# Patient Record
Sex: MALE | Race: WHITE | NOT HISPANIC OR LATINO | URBAN - METROPOLITAN AREA
[De-identification: names, ages, dates, MRNs, and addresses within clinical notes are randomized per-mention and may not be internally consistent; named-entity substitution may affect disease eponyms.]

---

## 2022-10-20 ENCOUNTER — APPOINTMENT (OUTPATIENT)
Dept: URBAN - METROPOLITAN AREA CLINIC 204 | Age: 62
Setting detail: DERMATOLOGY
End: 2022-10-20

## 2022-10-20 DIAGNOSIS — D485 NEOPLASM OF UNCERTAIN BEHAVIOR OF SKIN: ICD-10-CM

## 2022-10-20 PROBLEM — D48.5 NEOPLASM OF UNCERTAIN BEHAVIOR OF SKIN: Status: ACTIVE | Noted: 2022-10-20

## 2022-10-20 PROCEDURE — 99204 OFFICE O/P NEW MOD 45 MIN: CPT

## 2022-10-20 PROCEDURE — OTHER COUNSELING: OTHER

## 2022-10-20 PROCEDURE — OTHER SURGICAL DECISION MAKING: OTHER

## 2022-10-20 PROCEDURE — OTHER DEFER: OTHER

## 2022-10-20 PROCEDURE — OTHER MIPS QUALITY: OTHER

## 2022-10-20 NOTE — PROCEDURE: MIPS QUALITY
Quality 130: Documentation Of Current Medications In The Medical Record: Current Medications Documented
Detail Level: Detailed
Quality 110: Preventive Care And Screening: Influenza Immunization: Influenza Immunization Administered during Influenza season
Quality 226: Preventive Care And Screening: Tobacco Use: Screening And Cessation Intervention: Patient screened for tobacco use, is a smoker AND did not received Cessation Counseling for Unknown Reasons within the Previous 12 Months
Quality 111:Pneumonia Vaccination Status For Older Adults: Pneumococcal vaccine (PPSV23) was not administered on or after patient’s 60th birthday and before the end of the measurement period, reason not otherwise specified

## 2023-01-01 ENCOUNTER — HOSPITAL ENCOUNTER (INPATIENT)
Facility: HOSPITAL | Age: 63
LOS: 3 days | Discharge: HOME | DRG: 699 | End: 2023-12-01
Attending: EMERGENCY MEDICINE | Admitting: HOSPITALIST
Payer: MEDICARE

## 2023-01-01 ENCOUNTER — LAB (OUTPATIENT)
Dept: LAB | Facility: LAB | Age: 63
End: 2023-01-01
Payer: MEDICARE

## 2023-01-01 ENCOUNTER — EVALUATION (OUTPATIENT)
Dept: TRANSPLANT | Facility: HOSPITAL | Age: 63
End: 2023-01-01

## 2023-01-01 ENCOUNTER — TELEPHONE (OUTPATIENT)
Dept: PRIMARY CARE | Facility: CLINIC | Age: 63
End: 2023-01-01
Payer: COMMERCIAL

## 2023-01-01 ENCOUNTER — HOSPITAL ENCOUNTER (OUTPATIENT)
Dept: VASCULAR MEDICINE | Facility: CLINIC | Age: 63
Discharge: HOME | End: 2023-11-06
Payer: MEDICARE

## 2023-01-01 ENCOUNTER — OFFICE VISIT (OUTPATIENT)
Dept: NEPHROLOGY | Facility: CLINIC | Age: 63
End: 2023-01-01
Payer: COMMERCIAL

## 2023-01-01 ENCOUNTER — PATIENT OUTREACH (OUTPATIENT)
Dept: CARE COORDINATION | Facility: CLINIC | Age: 63
End: 2023-01-01
Payer: COMMERCIAL

## 2023-01-01 ENCOUNTER — OTHER (OUTPATIENT)
Dept: TRANSPLANT | Facility: HOSPITAL | Age: 63
End: 2023-01-01
Payer: MEDICARE

## 2023-01-01 ENCOUNTER — TELEPHONE (OUTPATIENT)
Dept: PRIMARY CARE | Facility: CLINIC | Age: 63
End: 2023-01-01
Payer: MEDICARE

## 2023-01-01 ENCOUNTER — APPOINTMENT (OUTPATIENT)
Dept: TRANSPLANT | Facility: HOSPITAL | Age: 63
End: 2023-01-01
Payer: COMMERCIAL

## 2023-01-01 ENCOUNTER — DOCUMENTATION (OUTPATIENT)
Dept: TRANSPLANT | Facility: HOSPITAL | Age: 63
End: 2023-01-01
Payer: COMMERCIAL

## 2023-01-01 ENCOUNTER — INFUSION (OUTPATIENT)
Dept: HEMATOLOGY/ONCOLOGY | Facility: CLINIC | Age: 63
End: 2023-01-01
Payer: MEDICARE

## 2023-01-01 ENCOUNTER — TELEPHONE (OUTPATIENT)
Dept: TRANSPLANT | Facility: HOSPITAL | Age: 63
End: 2023-01-01
Payer: COMMERCIAL

## 2023-01-01 ENCOUNTER — OFFICE VISIT (OUTPATIENT)
Dept: GASTROENTEROLOGY | Facility: CLINIC | Age: 63
End: 2023-01-01
Payer: COMMERCIAL

## 2023-01-01 ENCOUNTER — HOSPITAL ENCOUNTER (OUTPATIENT)
Dept: RADIOLOGY | Facility: HOSPITAL | Age: 63
Discharge: HOME | End: 2023-10-19
Payer: COMMERCIAL

## 2023-01-01 ENCOUNTER — ANCILLARY PROCEDURE (OUTPATIENT)
Dept: RADIOLOGY | Facility: CLINIC | Age: 63
End: 2023-01-01
Payer: COMMERCIAL

## 2023-01-01 ENCOUNTER — SOCIAL WORK (OUTPATIENT)
Dept: TRANSPLANT | Facility: HOSPITAL | Age: 63
End: 2023-01-01
Payer: COMMERCIAL

## 2023-01-01 ENCOUNTER — OFFICE VISIT (OUTPATIENT)
Dept: TRANSPLANT | Facility: HOSPITAL | Age: 63
End: 2023-01-01
Payer: COMMERCIAL

## 2023-01-01 ENCOUNTER — CONSULT (OUTPATIENT)
Dept: CARDIOLOGY | Facility: HOSPITAL | Age: 63
End: 2023-01-01
Payer: COMMERCIAL

## 2023-01-01 ENCOUNTER — APPOINTMENT (OUTPATIENT)
Dept: RADIOLOGY | Facility: HOSPITAL | Age: 63
DRG: 699 | End: 2023-01-01
Payer: MEDICARE

## 2023-01-01 ENCOUNTER — HOSPITAL ENCOUNTER (OUTPATIENT)
Facility: HOSPITAL | Age: 63
Setting detail: OUTPATIENT SURGERY
Discharge: HOME | End: 2023-11-16
Attending: HOSPITALIST | Admitting: HOSPITALIST
Payer: MEDICARE

## 2023-01-01 ENCOUNTER — OFFICE VISIT (OUTPATIENT)
Dept: PRIMARY CARE | Facility: CLINIC | Age: 63
End: 2023-01-01
Payer: MEDICARE

## 2023-01-01 ENCOUNTER — TELEMEDICINE (OUTPATIENT)
Dept: HEMATOLOGY/ONCOLOGY | Facility: CLINIC | Age: 63
End: 2023-01-01
Payer: MEDICARE

## 2023-01-01 ENCOUNTER — DOCUMENTATION (OUTPATIENT)
Dept: TRANSPLANT | Facility: HOSPITAL | Age: 63
End: 2023-01-01

## 2023-01-01 ENCOUNTER — PATIENT OUTREACH (OUTPATIENT)
Dept: CARE COORDINATION | Facility: CLINIC | Age: 63
End: 2023-01-01

## 2023-01-01 ENCOUNTER — TELEPHONE (OUTPATIENT)
Dept: CARDIOLOGY | Facility: CLINIC | Age: 63
End: 2023-01-01

## 2023-01-01 ENCOUNTER — LAB (OUTPATIENT)
Dept: LAB | Facility: LAB | Age: 63
End: 2023-01-01
Payer: COMMERCIAL

## 2023-01-01 ENCOUNTER — PHARMACY VISIT (OUTPATIENT)
Dept: PHARMACY | Facility: CLINIC | Age: 63
End: 2023-01-01
Payer: COMMERCIAL

## 2023-01-01 ENCOUNTER — OFFICE VISIT (OUTPATIENT)
Dept: PRIMARY CARE | Facility: CLINIC | Age: 63
End: 2023-01-01
Payer: MEDICAID

## 2023-01-01 VITALS
BODY MASS INDEX: 39.22 KG/M2 | SYSTOLIC BLOOD PRESSURE: 128 MMHG | DIASTOLIC BLOOD PRESSURE: 70 MMHG | HEART RATE: 81 BPM | WEIGHT: 289.2 LBS

## 2023-01-01 VITALS — BODY MASS INDEX: 38.79 KG/M2 | SYSTOLIC BLOOD PRESSURE: 152 MMHG | DIASTOLIC BLOOD PRESSURE: 88 MMHG | WEIGHT: 286 LBS

## 2023-01-01 VITALS
OXYGEN SATURATION: 95 % | RESPIRATION RATE: 18 BRPM | HEIGHT: 72 IN | SYSTOLIC BLOOD PRESSURE: 133 MMHG | WEIGHT: 287.6 LBS | DIASTOLIC BLOOD PRESSURE: 77 MMHG | BODY MASS INDEX: 38.95 KG/M2 | HEART RATE: 80 BPM

## 2023-01-01 VITALS
SYSTOLIC BLOOD PRESSURE: 173 MMHG | HEIGHT: 72 IN | DIASTOLIC BLOOD PRESSURE: 74 MMHG | HEART RATE: 83 BPM | RESPIRATION RATE: 18 BRPM | WEIGHT: 290 LBS | BODY MASS INDEX: 39.28 KG/M2 | OXYGEN SATURATION: 98 % | TEMPERATURE: 98.2 F

## 2023-01-01 VITALS
HEIGHT: 71 IN | BODY MASS INDEX: 41.05 KG/M2 | DIASTOLIC BLOOD PRESSURE: 68 MMHG | WEIGHT: 293.2 LBS | SYSTOLIC BLOOD PRESSURE: 132 MMHG | HEART RATE: 73 BPM

## 2023-01-01 VITALS
HEART RATE: 72 BPM | DIASTOLIC BLOOD PRESSURE: 71 MMHG | TEMPERATURE: 97.2 F | WEIGHT: 298.28 LBS | BODY MASS INDEX: 41.6 KG/M2 | OXYGEN SATURATION: 95 % | SYSTOLIC BLOOD PRESSURE: 150 MMHG | RESPIRATION RATE: 16 BRPM

## 2023-01-01 VITALS
SYSTOLIC BLOOD PRESSURE: 132 MMHG | HEIGHT: 72 IN | DIASTOLIC BLOOD PRESSURE: 76 MMHG | HEART RATE: 75 BPM | WEIGHT: 290.2 LBS | BODY MASS INDEX: 39.31 KG/M2

## 2023-01-01 VITALS
DIASTOLIC BLOOD PRESSURE: 66 MMHG | BODY MASS INDEX: 38.74 KG/M2 | HEIGHT: 72 IN | WEIGHT: 286 LBS | SYSTOLIC BLOOD PRESSURE: 138 MMHG | HEART RATE: 81 BPM

## 2023-01-01 VITALS
TEMPERATURE: 97.5 F | OXYGEN SATURATION: 98 % | DIASTOLIC BLOOD PRESSURE: 73 MMHG | SYSTOLIC BLOOD PRESSURE: 133 MMHG | BODY MASS INDEX: 39.16 KG/M2 | HEIGHT: 72 IN | HEART RATE: 84 BPM | WEIGHT: 289.1 LBS

## 2023-01-01 VITALS
WEIGHT: 279 LBS | SYSTOLIC BLOOD PRESSURE: 80 MMHG | BODY MASS INDEX: 35.81 KG/M2 | HEIGHT: 74 IN | HEART RATE: 80 BPM | DIASTOLIC BLOOD PRESSURE: 47 MMHG

## 2023-01-01 VITALS
DIASTOLIC BLOOD PRESSURE: 84 MMHG | HEART RATE: 70 BPM | WEIGHT: 276.68 LBS | HEIGHT: 72 IN | TEMPERATURE: 98.4 F | BODY MASS INDEX: 37.47 KG/M2 | SYSTOLIC BLOOD PRESSURE: 143 MMHG | OXYGEN SATURATION: 96 % | RESPIRATION RATE: 19 BRPM

## 2023-01-01 VITALS
OXYGEN SATURATION: 95 % | WEIGHT: 293.87 LBS | HEART RATE: 68 BPM | BODY MASS INDEX: 40.99 KG/M2 | SYSTOLIC BLOOD PRESSURE: 112 MMHG | DIASTOLIC BLOOD PRESSURE: 68 MMHG | TEMPERATURE: 97 F

## 2023-01-01 VITALS
BODY MASS INDEX: 41.44 KG/M2 | WEIGHT: 296 LBS | DIASTOLIC BLOOD PRESSURE: 68 MMHG | HEIGHT: 71 IN | SYSTOLIC BLOOD PRESSURE: 110 MMHG | HEART RATE: 77 BPM

## 2023-01-01 DIAGNOSIS — N18.4 CKD (CHRONIC KIDNEY DISEASE) STAGE 4, GFR 15-29 ML/MIN (MULTI): ICD-10-CM

## 2023-01-01 DIAGNOSIS — R80.1 PERSISTENT PROTEINURIA: ICD-10-CM

## 2023-01-01 DIAGNOSIS — E11.22 CONTROLLED TYPE 2 DIABETES MELLITUS WITH STAGE 3 CHRONIC KIDNEY DISEASE, WITH LONG-TERM CURRENT USE OF INSULIN (MULTI): ICD-10-CM

## 2023-01-01 DIAGNOSIS — R07.9 CHEST PAIN, UNSPECIFIED TYPE: ICD-10-CM

## 2023-01-01 DIAGNOSIS — I82.409 ACUTE EMBOLISM AND THROMBOSIS OF UNSPECIFIED DEEP VEINS OF UNSPECIFIED LOWER EXTREMITY (MULTI): ICD-10-CM

## 2023-01-01 DIAGNOSIS — I10 PRIMARY HYPERTENSION: ICD-10-CM

## 2023-01-01 DIAGNOSIS — N18.4 CKD (CHRONIC KIDNEY DISEASE) STAGE 4, GFR 15-29 ML/MIN (MULTI): Primary | ICD-10-CM

## 2023-01-01 DIAGNOSIS — E11.22 TYPE 2 DIABETES MELLITUS WITH STAGE 3B CHRONIC KIDNEY DISEASE, WITH LONG-TERM CURRENT USE OF INSULIN (MULTI): ICD-10-CM

## 2023-01-01 DIAGNOSIS — E03.9 ACQUIRED HYPOTHYROIDISM: ICD-10-CM

## 2023-01-01 DIAGNOSIS — G47.33 OSA (OBSTRUCTIVE SLEEP APNEA): Primary | ICD-10-CM

## 2023-01-01 DIAGNOSIS — N18.6 ESRD (END STAGE RENAL DISEASE) (MULTI): ICD-10-CM

## 2023-01-01 DIAGNOSIS — D63.1 ANEMIA IN STAGE 4 CHRONIC KIDNEY DISEASE (MULTI): ICD-10-CM

## 2023-01-01 DIAGNOSIS — I82.4Z9 DEEP VEIN THROMBOSIS (DVT) OF DISTAL VEIN OF LOWER EXTREMITY, UNSPECIFIED CHRONICITY, UNSPECIFIED LATERALITY (MULTI): Primary | ICD-10-CM

## 2023-01-01 DIAGNOSIS — E03.9 ACQUIRED HYPOTHYROIDISM: Primary | ICD-10-CM

## 2023-01-01 DIAGNOSIS — E11.22 CONTROLLED TYPE 2 DIABETES MELLITUS WITH STAGE 3 CHRONIC KIDNEY DISEASE, WITH LONG-TERM CURRENT USE OF INSULIN (MULTI): Primary | ICD-10-CM

## 2023-01-01 DIAGNOSIS — N18.6 END STAGE RENAL DISEASE (MULTI): ICD-10-CM

## 2023-01-01 DIAGNOSIS — N18.32 TYPE 2 DIABETES MELLITUS WITH STAGE 3B CHRONIC KIDNEY DISEASE, WITH LONG-TERM CURRENT USE OF INSULIN (MULTI): ICD-10-CM

## 2023-01-01 DIAGNOSIS — Z91.041 CONTRAST MEDIA ALLERGY: ICD-10-CM

## 2023-01-01 DIAGNOSIS — N18.4 ANEMIA IN STAGE 4 CHRONIC KIDNEY DISEASE (MULTI): ICD-10-CM

## 2023-01-01 DIAGNOSIS — N40.1 BENIGN PROSTATIC HYPERPLASIA WITH URINARY FREQUENCY: Primary | ICD-10-CM

## 2023-01-01 DIAGNOSIS — I10 HYPERTENSION, UNSPECIFIED TYPE: Primary | ICD-10-CM

## 2023-01-01 DIAGNOSIS — I50.9 ACUTE ON CHRONIC CONGESTIVE HEART FAILURE, UNSPECIFIED HEART FAILURE TYPE (MULTI): Primary | ICD-10-CM

## 2023-01-01 DIAGNOSIS — N18.4 ANEMIA IN STAGE 4 CHRONIC KIDNEY DISEASE (MULTI): Primary | ICD-10-CM

## 2023-01-01 DIAGNOSIS — I82.4Z9 DEEP VEIN THROMBOSIS (DVT) OF DISTAL VEIN OF LOWER EXTREMITY, UNSPECIFIED CHRONICITY, UNSPECIFIED LATERALITY (MULTI): ICD-10-CM

## 2023-01-01 DIAGNOSIS — N18.30 CONTROLLED TYPE 2 DIABETES MELLITUS WITH STAGE 3 CHRONIC KIDNEY DISEASE, WITH LONG-TERM CURRENT USE OF INSULIN (MULTI): Primary | ICD-10-CM

## 2023-01-01 DIAGNOSIS — N18.32 TYPE 2 DIABETES MELLITUS WITH STAGE 3B CHRONIC KIDNEY DISEASE, WITH LONG-TERM CURRENT USE OF INSULIN (MULTI): Primary | ICD-10-CM

## 2023-01-01 DIAGNOSIS — G47.33 OSA (OBSTRUCTIVE SLEEP APNEA): ICD-10-CM

## 2023-01-01 DIAGNOSIS — I25.10 CORONARY ARTERY DISEASE INVOLVING NATIVE CORONARY ARTERY OF NATIVE HEART, UNSPECIFIED WHETHER ANGINA PRESENT: ICD-10-CM

## 2023-01-01 DIAGNOSIS — N18.4 ANEMIA DUE TO STAGE 4 CHRONIC KIDNEY DISEASE (MULTI): ICD-10-CM

## 2023-01-01 DIAGNOSIS — Z23 NEED FOR INFLUENZA VACCINATION: ICD-10-CM

## 2023-01-01 DIAGNOSIS — E78.2 MIXED HYPERLIPIDEMIA: ICD-10-CM

## 2023-01-01 DIAGNOSIS — Z86.010 HISTORY OF COLON POLYPS: ICD-10-CM

## 2023-01-01 DIAGNOSIS — Z79.4 CONTROLLED TYPE 2 DIABETES MELLITUS WITH STAGE 3 CHRONIC KIDNEY DISEASE, WITH LONG-TERM CURRENT USE OF INSULIN (MULTI): ICD-10-CM

## 2023-01-01 DIAGNOSIS — I70.0 ATHEROSCLEROSIS OF AORTA (CMS-HCC): ICD-10-CM

## 2023-01-01 DIAGNOSIS — N18.9 CHRONIC KIDNEY DISEASE, UNSPECIFIED: ICD-10-CM

## 2023-01-01 DIAGNOSIS — D63.1 ANEMIA DUE TO STAGE 4 CHRONIC KIDNEY DISEASE (MULTI): ICD-10-CM

## 2023-01-01 DIAGNOSIS — T86.91 REJECTION OF TRANSPLANTED ORGAN: ICD-10-CM

## 2023-01-01 DIAGNOSIS — E11.29 TYPE 2 DIABETES MELLITUS WITH MICROALBUMINURIA, WITH LONG-TERM CURRENT USE OF INSULIN (MULTI): Primary | ICD-10-CM

## 2023-01-01 DIAGNOSIS — Z79.4 TYPE 2 DIABETES MELLITUS WITH STAGE 3B CHRONIC KIDNEY DISEASE, WITH LONG-TERM CURRENT USE OF INSULIN (MULTI): ICD-10-CM

## 2023-01-01 DIAGNOSIS — Z79.4 CONTROLLED TYPE 2 DIABETES MELLITUS WITH STAGE 3 CHRONIC KIDNEY DISEASE, WITH LONG-TERM CURRENT USE OF INSULIN (MULTI): Primary | ICD-10-CM

## 2023-01-01 DIAGNOSIS — Z01.818 PRE-TRANSPLANT EVALUATION FOR KIDNEY TRANSPLANT: Primary | ICD-10-CM

## 2023-01-01 DIAGNOSIS — N18.30 CONTROLLED TYPE 2 DIABETES MELLITUS WITH STAGE 3 CHRONIC KIDNEY DISEASE, WITH LONG-TERM CURRENT USE OF INSULIN (MULTI): ICD-10-CM

## 2023-01-01 DIAGNOSIS — I25.10 CORONARY ARTERY DISEASE INVOLVING NATIVE CORONARY ARTERY OF NATIVE HEART WITHOUT ANGINA PECTORIS: ICD-10-CM

## 2023-01-01 DIAGNOSIS — R80.9 TYPE 2 DIABETES MELLITUS WITH MICROALBUMINURIA, WITH LONG-TERM CURRENT USE OF INSULIN (MULTI): Primary | ICD-10-CM

## 2023-01-01 DIAGNOSIS — E11.22 TYPE 2 DIABETES MELLITUS WITH STAGE 3B CHRONIC KIDNEY DISEASE, WITH LONG-TERM CURRENT USE OF INSULIN (MULTI): Primary | ICD-10-CM

## 2023-01-01 DIAGNOSIS — K59.1 FUNCTIONAL DIARRHEA: Primary | ICD-10-CM

## 2023-01-01 DIAGNOSIS — K21.9 GASTROESOPHAGEAL REFLUX DISEASE WITHOUT ESOPHAGITIS: ICD-10-CM

## 2023-01-01 DIAGNOSIS — Z94.0 HISTORY OF KIDNEY TRANSPLANT (HHS-HCC): ICD-10-CM

## 2023-01-01 DIAGNOSIS — Z01.818 PRE-TRANSPLANT EVALUATION FOR KIDNEY TRANSPLANT: ICD-10-CM

## 2023-01-01 DIAGNOSIS — D84.9 IMMUNOSUPPRESSION (MULTI): ICD-10-CM

## 2023-01-01 DIAGNOSIS — Z94.0 KIDNEY TRANSPLANT STATUS (HHS-HCC): ICD-10-CM

## 2023-01-01 DIAGNOSIS — Z01.818 PRE-OP EVALUATION: ICD-10-CM

## 2023-01-01 DIAGNOSIS — Z01.818 PRE-TRANSPLANT EVALUATION FOR CHRONIC KIDNEY DISEASE: ICD-10-CM

## 2023-01-01 DIAGNOSIS — Z79.4 TYPE 2 DIABETES MELLITUS WITH MICROALBUMINURIA, WITH LONG-TERM CURRENT USE OF INSULIN (MULTI): Primary | ICD-10-CM

## 2023-01-01 DIAGNOSIS — R19.7 DIARRHEA, UNSPECIFIED TYPE: Primary | ICD-10-CM

## 2023-01-01 DIAGNOSIS — Z94.0 KIDNEY TRANSPLANT STATUS (HHS-HCC): Primary | ICD-10-CM

## 2023-01-01 DIAGNOSIS — R06.09 DOE (DYSPNEA ON EXERTION): ICD-10-CM

## 2023-01-01 DIAGNOSIS — D63.1 ANEMIA IN STAGE 4 CHRONIC KIDNEY DISEASE (MULTI): Primary | ICD-10-CM

## 2023-01-01 DIAGNOSIS — Z12.5 SCREENING FOR PROSTATE CANCER: ICD-10-CM

## 2023-01-01 DIAGNOSIS — F51.01 PRIMARY INSOMNIA: ICD-10-CM

## 2023-01-01 DIAGNOSIS — N17.9 AKI (ACUTE KIDNEY INJURY) (CMS-HCC): ICD-10-CM

## 2023-01-01 DIAGNOSIS — Z79.4 TYPE 2 DIABETES MELLITUS WITH STAGE 3B CHRONIC KIDNEY DISEASE, WITH LONG-TERM CURRENT USE OF INSULIN (MULTI): Primary | ICD-10-CM

## 2023-01-01 DIAGNOSIS — R19.7 DIARRHEA, UNSPECIFIED TYPE: ICD-10-CM

## 2023-01-01 DIAGNOSIS — J41.0 SIMPLE CHRONIC BRONCHITIS (MULTI): ICD-10-CM

## 2023-01-01 DIAGNOSIS — E78.5 DYSLIPIDEMIA: ICD-10-CM

## 2023-01-01 DIAGNOSIS — F33.9 DEPRESSION, RECURRENT (CMS-HCC): ICD-10-CM

## 2023-01-01 DIAGNOSIS — R35.0 BENIGN PROSTATIC HYPERPLASIA WITH URINARY FREQUENCY: Primary | ICD-10-CM

## 2023-01-01 DIAGNOSIS — D63.1 ANEMIA IN CHRONIC KIDNEY DISEASE (CODE): ICD-10-CM

## 2023-01-01 DIAGNOSIS — R31.29 OTHER MICROSCOPIC HEMATURIA: ICD-10-CM

## 2023-01-01 DIAGNOSIS — E66.01 OBESITY, MORBID (MULTI): ICD-10-CM

## 2023-01-01 DIAGNOSIS — Z01.818 PRE-OP EVALUATION: Primary | ICD-10-CM

## 2023-01-01 DIAGNOSIS — J44.9 CHRONIC OBSTRUCTIVE PULMONARY DISEASE, UNSPECIFIED COPD TYPE (MULTI): ICD-10-CM

## 2023-01-01 DIAGNOSIS — R53.82 CHRONIC FATIGUE: Primary | ICD-10-CM

## 2023-01-01 DIAGNOSIS — M79.604 PAIN IN RIGHT LEG: ICD-10-CM

## 2023-01-01 DIAGNOSIS — N18.9 CHRONIC KIDNEY DISEASE, UNSPECIFIED CKD STAGE: ICD-10-CM

## 2023-01-01 LAB
25(OH)D3 SERPL-MCNC: 7 NG/ML (ref 30–100)
ABO GROUP (TYPE) IN BLOOD: NORMAL
ABO GROUP (TYPE) IN BLOOD: NORMAL
ALBUMIN (G/DL) IN SER/PLAS: 3.4 G/DL (ref 3.4–5)
ALBUMIN (G/DL) IN SER/PLAS: 3.5 G/DL (ref 3.4–5)
ALBUMIN (G/DL) IN SER/PLAS: 3.6 G/DL (ref 3.4–5)
ALBUMIN (G/DL) IN SER/PLAS: 3.7 G/DL (ref 3.4–5)
ALBUMIN (G/DL) IN SER/PLAS: 3.8 G/DL (ref 3.4–5)
ALBUMIN (G/DL) IN SER/PLAS: NORMAL
ALBUMIN SERPL BCP-MCNC: 3 G/DL (ref 3.4–5)
ALBUMIN SERPL BCP-MCNC: 3.1 G/DL (ref 3.4–5)
ALBUMIN SERPL BCP-MCNC: 3.4 G/DL (ref 3.4–5)
ALBUMIN SERPL BCP-MCNC: 3.4 G/DL (ref 3.4–5)
ALBUMIN SERPL BCP-MCNC: 3.5 G/DL (ref 3.4–5)
ALBUMIN SERPL BCP-MCNC: 3.7 G/DL (ref 3.4–5)
ALBUMIN SERPL BCP-MCNC: 3.8 G/DL (ref 3.4–5)
ALP SERPL-CCNC: 57 U/L (ref 33–136)
ALP SERPL-CCNC: 60 U/L (ref 33–136)
ALP SERPL-CCNC: 62 U/L (ref 33–136)
ALP SERPL-CCNC: 64 U/L (ref 33–136)
ALP SERPL-CCNC: 70 U/L (ref 33–136)
ALP SERPL-CCNC: 75 U/L (ref 33–136)
ALP SERPL-CCNC: 82 U/L (ref 33–136)
ALT SERPL W P-5'-P-CCNC: 10 U/L (ref 10–52)
ALT SERPL W P-5'-P-CCNC: 10 U/L (ref 10–52)
ALT SERPL W P-5'-P-CCNC: 12 U/L (ref 10–52)
ALT SERPL W P-5'-P-CCNC: 7 U/L (ref 10–52)
ALT SERPL W P-5'-P-CCNC: 8 U/L (ref 10–52)
AMYLASE SERPL-CCNC: 58 U/L (ref 29–103)
ANION GAP IN SER/PLAS: 10 MMOL/L (ref 10–20)
ANION GAP IN SER/PLAS: 11 MMOL/L (ref 10–20)
ANION GAP IN SER/PLAS: 12 MMOL/L (ref 10–20)
ANION GAP IN SER/PLAS: 12 MMOL/L (ref 10–20)
ANION GAP IN SER/PLAS: 13 MMOL/L (ref 10–20)
ANION GAP IN SER/PLAS: 14 MMOL/L (ref 10–20)
ANION GAP IN SER/PLAS: 14 MMOL/L (ref 10–20)
ANION GAP IN SER/PLAS: 15 MMOL/L (ref 10–20)
ANION GAP IN SER/PLAS: NORMAL
ANION GAP SERPL CALC-SCNC: 12 MMOL/L (ref 10–20)
ANION GAP SERPL CALC-SCNC: 12 MMOL/L (ref 10–20)
ANION GAP SERPL CALC-SCNC: 13 MMOL/L (ref 10–20)
ANION GAP SERPL CALC-SCNC: 14 MMOL/L (ref 10–20)
ANION GAP SERPL CALC-SCNC: 14 MMOL/L (ref 10–20)
ANION GAP SERPL CALC-SCNC: 15 MMOL/L (ref 10–20)
ANTIBODY SCREEN: NORMAL
APPEARANCE UR: ABNORMAL
APPEARANCE UR: ABNORMAL
APTT PPP: 40 SECONDS (ref 27–38)
ARTERIAL PATENCY WRIST A: POSITIVE
AST SERPL W P-5'-P-CCNC: 10 U/L (ref 9–39)
AST SERPL W P-5'-P-CCNC: 10 U/L (ref 9–39)
AST SERPL W P-5'-P-CCNC: 11 U/L (ref 9–39)
AST SERPL W P-5'-P-CCNC: 12 U/L (ref 9–39)
AST SERPL W P-5'-P-CCNC: 15 U/L (ref 9–39)
ATRIAL RATE: 80 BPM
BASE EXCESS BLDA CALC-SCNC: 2.4 MMOL/L (ref -2–3)
BASOPHILS # BLD AUTO: 0.04 X10*3/UL (ref 0–0.1)
BASOPHILS # BLD AUTO: 0.05 X10*3/UL (ref 0–0.1)
BASOPHILS # BLD AUTO: 0.05 X10*3/UL (ref 0–0.1)
BASOPHILS # BLD AUTO: 0.06 X10*3/UL (ref 0–0.1)
BASOPHILS (10*3/UL) IN BLOOD BY AUTOMATED COUNT: 0.06 X10E9/L (ref 0–0.1)
BASOPHILS NFR BLD AUTO: 0.4 %
BASOPHILS NFR BLD AUTO: 0.6 %
BASOPHILS/100 LEUKOCYTES IN BLOOD BY AUTOMATED COUNT: 0.6 % (ref 0–2)
BILIRUB DIRECT SERPL-MCNC: 0.1 MG/DL (ref 0–0.3)
BILIRUB SERPL-MCNC: 0.2 MG/DL (ref 0–1.2)
BILIRUB SERPL-MCNC: 0.3 MG/DL (ref 0–1.2)
BILIRUB UR STRIP.AUTO-MCNC: NEGATIVE MG/DL
BILIRUB UR STRIP.AUTO-MCNC: NEGATIVE MG/DL
BLOOD EXPIRATION DATE: NORMAL
BNP SERPL-MCNC: 376 PG/ML (ref 0–99)
BODY TEMPERATURE: 37 DEGREES CELSIUS
BUN SERPL-MCNC: 47 MG/DL (ref 6–23)
BUN SERPL-MCNC: 49 MG/DL (ref 6–23)
BUN SERPL-MCNC: 49 MG/DL (ref 6–23)
BUN SERPL-MCNC: 50 MG/DL (ref 6–23)
BUN SERPL-MCNC: 50 MG/DL (ref 6–23)
BUN SERPL-MCNC: 55 MG/DL (ref 6–23)
BUN SERPL-MCNC: 67 MG/DL (ref 6–23)
BUN SERPL-MCNC: 79 MG/DL (ref 6–23)
BUN SERPL-MCNC: 79 MG/DL (ref 6–23)
C PEPTIDE SERPL-MCNC: 1.5 NG/ML (ref 0.7–3.9)
C. DIFFICILE TOXIN, PCR: NOT DETECTED
CALCIUM (MG/DL) IN SER/PLAS: 8.5 MG/DL (ref 8.6–10.3)
CALCIUM (MG/DL) IN SER/PLAS: 8.6 MG/DL (ref 8.6–10.3)
CALCIUM (MG/DL) IN SER/PLAS: 8.8 MG/DL (ref 8.6–10.3)
CALCIUM (MG/DL) IN SER/PLAS: 8.9 MG/DL (ref 8.6–10.3)
CALCIUM (MG/DL) IN SER/PLAS: 8.9 MG/DL (ref 8.6–10.3)
CALCIUM (MG/DL) IN SER/PLAS: 9.1 MG/DL (ref 8.6–10.3)
CALCIUM (MG/DL) IN SER/PLAS: 9.2 MG/DL (ref 8.6–10.3)
CALCIUM (MG/DL) IN SER/PLAS: 9.2 MG/DL (ref 8.6–10.3)
CALCIUM (MG/DL) IN SER/PLAS: 9.3 MG/DL (ref 8.6–10.3)
CALCIUM (MG/DL) IN SER/PLAS: 9.5 MG/DL (ref 8.6–10.3)
CALCIUM (MG/DL) IN SER/PLAS: 9.7 MG/DL (ref 8.6–10.3)
CALCIUM (MG/DL) IN SER/PLAS: NORMAL
CALCIUM SERPL-MCNC: 7.8 MG/DL (ref 8.6–10.3)
CALCIUM SERPL-MCNC: 7.9 MG/DL (ref 8.6–10.3)
CALCIUM SERPL-MCNC: 8.3 MG/DL (ref 8.6–10.3)
CALCIUM SERPL-MCNC: 8.4 MG/DL (ref 8.6–10.3)
CALCIUM SERPL-MCNC: 8.4 MG/DL (ref 8.6–10.3)
CALCIUM SERPL-MCNC: 8.5 MG/DL (ref 8.6–10.3)
CALCIUM SERPL-MCNC: 8.6 MG/DL (ref 8.6–10.3)
CALCIUM SERPL-MCNC: 8.8 MG/DL (ref 8.6–10.3)
CAMPYLOBACTER GP: NOT DETECTED
CARBON DIOXIDE, TOTAL (MMOL/L) IN SER/PLAS: 16 MMOL/L (ref 21–32)
CARBON DIOXIDE, TOTAL (MMOL/L) IN SER/PLAS: 19 MMOL/L (ref 21–32)
CARBON DIOXIDE, TOTAL (MMOL/L) IN SER/PLAS: 19 MMOL/L (ref 21–32)
CARBON DIOXIDE, TOTAL (MMOL/L) IN SER/PLAS: 21 MMOL/L (ref 21–32)
CARBON DIOXIDE, TOTAL (MMOL/L) IN SER/PLAS: 22 MMOL/L (ref 21–32)
CARBON DIOXIDE, TOTAL (MMOL/L) IN SER/PLAS: 24 MMOL/L (ref 21–32)
CARBON DIOXIDE, TOTAL (MMOL/L) IN SER/PLAS: 25 MMOL/L (ref 21–32)
CARBON DIOXIDE, TOTAL (MMOL/L) IN SER/PLAS: 25 MMOL/L (ref 21–32)
CARBON DIOXIDE, TOTAL (MMOL/L) IN SER/PLAS: 29 MMOL/L (ref 21–32)
CARBON DIOXIDE, TOTAL (MMOL/L) IN SER/PLAS: NORMAL
CARDIAC TROPONIN I PNL SERPL HS: 20 NG/L (ref 0–20)
CARDIAC TROPONIN I PNL SERPL HS: 20 NG/L (ref 0–20)
CHLORIDE (MMOL/L) IN SER/PLAS: 110 MMOL/L (ref 98–107)
CHLORIDE (MMOL/L) IN SER/PLAS: 111 MMOL/L (ref 98–107)
CHLORIDE (MMOL/L) IN SER/PLAS: 111 MMOL/L (ref 98–107)
CHLORIDE (MMOL/L) IN SER/PLAS: 112 MMOL/L (ref 98–107)
CHLORIDE (MMOL/L) IN SER/PLAS: 113 MMOL/L (ref 98–107)
CHLORIDE (MMOL/L) IN SER/PLAS: 114 MMOL/L (ref 98–107)
CHLORIDE (MMOL/L) IN SER/PLAS: 116 MMOL/L (ref 98–107)
CHLORIDE (MMOL/L) IN SER/PLAS: NORMAL
CHLORIDE SERPL-SCNC: 108 MMOL/L (ref 98–107)
CHLORIDE SERPL-SCNC: 109 MMOL/L (ref 98–107)
CHLORIDE SERPL-SCNC: 111 MMOL/L (ref 98–107)
CHLORIDE SERPL-SCNC: 112 MMOL/L (ref 98–107)
CHLORIDE SERPL-SCNC: 112 MMOL/L (ref 98–107)
CHLORIDE SERPL-SCNC: 113 MMOL/L (ref 98–107)
CHLORIDE SERPL-SCNC: 114 MMOL/L (ref 98–107)
CHLORIDE SERPL-SCNC: 115 MMOL/L (ref 98–107)
CMV IGG AVIDITY SERPL IA-RTO: NONREACTIVE %
CO2 SERPL-SCNC: 22 MMOL/L (ref 21–32)
CO2 SERPL-SCNC: 23 MMOL/L (ref 21–32)
CO2 SERPL-SCNC: 23 MMOL/L (ref 21–32)
CO2 SERPL-SCNC: 24 MMOL/L (ref 21–32)
CO2 SERPL-SCNC: 26 MMOL/L (ref 21–32)
CO2 SERPL-SCNC: 27 MMOL/L (ref 21–32)
CO2 SERPL-SCNC: 28 MMOL/L (ref 21–32)
CO2 SERPL-SCNC: 30 MMOL/L (ref 21–32)
COBALAMIN (VITAMIN B12) (PG/ML) IN SER/PLAS: 274 PG/ML (ref 211–911)
COLOR UR: YELLOW
COLOR UR: YELLOW
COTININE SERPL-MCNC: <5 NG/ML
CREAT SERPL-MCNC: 2.69 MG/DL (ref 0.5–1.3)
CREAT SERPL-MCNC: 2.95 MG/DL (ref 0.5–1.3)
CREAT SERPL-MCNC: 2.96 MG/DL (ref 0.5–1.3)
CREAT SERPL-MCNC: 3.03 MG/DL (ref 0.5–1.3)
CREAT SERPL-MCNC: 3.04 MG/DL (ref 0.5–1.3)
CREAT SERPL-MCNC: 3.06 MG/DL (ref 0.5–1.3)
CREAT SERPL-MCNC: 3.12 MG/DL (ref 0.5–1.3)
CREAT SERPL-MCNC: 3.14 MG/DL (ref 0.5–1.3)
CREAT SERPL-MCNC: 3.61 MG/DL (ref 0.5–1.3)
CREAT UR-MCNC: 90.3 MG/DL (ref 20–370)
CREAT UR-MCNC: 92 MG/DL (ref 20–370)
CREATININE (MG/DL) IN SER/PLAS: 2.3 MG/DL (ref 0.5–1.3)
CREATININE (MG/DL) IN SER/PLAS: 2.33 MG/DL (ref 0.5–1.3)
CREATININE (MG/DL) IN SER/PLAS: 2.33 MG/DL (ref 0.5–1.3)
CREATININE (MG/DL) IN SER/PLAS: 2.35 MG/DL (ref 0.5–1.3)
CREATININE (MG/DL) IN SER/PLAS: 2.37 MG/DL (ref 0.5–1.3)
CREATININE (MG/DL) IN SER/PLAS: 2.54 MG/DL (ref 0.5–1.3)
CREATININE (MG/DL) IN SER/PLAS: 2.66 MG/DL (ref 0.5–1.3)
CREATININE (MG/DL) IN SER/PLAS: 2.69 MG/DL (ref 0.5–1.3)
CREATININE (MG/DL) IN SER/PLAS: 2.75 MG/DL (ref 0.5–1.3)
CREATININE (MG/DL) IN SER/PLAS: 2.77 MG/DL (ref 0.5–1.3)
CREATININE (MG/DL) IN SER/PLAS: 2.88 MG/DL (ref 0.5–1.3)
CREATININE (MG/DL) IN SER/PLAS: 3.3 MG/DL (ref 0.5–1.3)
CREATININE (MG/DL) IN SER/PLAS: 4.43 MG/DL (ref 0.5–1.3)
CREATININE (MG/DL) IN SER/PLAS: NORMAL
CREATININE (MG/DL) IN URINE: 101 MG/DL (ref 20–370)
CREATININE (MG/DL) IN URINE: 116 MG/DL (ref 20–370)
CREATININE (MG/DL) IN URINE: 119 MG/DL (ref 20–370)
CREATININE (MG/DL) IN URINE: 72 MG/DL (ref 20–370)
CREATININE (MG/DL) IN URINE: NORMAL
D DIMER PPP FEU-MCNC: 601 NG/ML FEU
DISPENSE STATUS: NORMAL
EBV EA IGG SER QL: NEGATIVE
EBV NA AB SER QL: POSITIVE
EBV VCA IGG SER IA-ACNC: POSITIVE
EBV VCA IGM SER IA-ACNC: ABNORMAL
EOSINOPHIL # BLD AUTO: 0.17 X10*3/UL (ref 0–0.7)
EOSINOPHIL # BLD AUTO: 0.2 X10*3/UL (ref 0–0.7)
EOSINOPHIL # BLD AUTO: 0.22 X10*3/UL (ref 0–0.7)
EOSINOPHIL # BLD AUTO: 0.23 X10*3/UL (ref 0–0.7)
EOSINOPHIL NFR BLD AUTO: 1.7 %
EOSINOPHIL NFR BLD AUTO: 1.8 %
EOSINOPHIL NFR BLD AUTO: 1.9 %
EOSINOPHIL NFR BLD AUTO: 2.3 %
EOSINOPHILS (10*3/UL) IN BLOOD BY AUTOMATED COUNT: 0.19 X10E9/L (ref 0–0.7)
EOSINOPHILS/100 LEUKOCYTES IN BLOOD BY AUTOMATED COUNT: 2 % (ref 0–6)
ERYTHROCYTE DISTRIBUTION WIDTH (RATIO) BY AUTOMATED COUNT: 13 % (ref 11.5–14.5)
ERYTHROCYTE DISTRIBUTION WIDTH (RATIO) BY AUTOMATED COUNT: 13.2 % (ref 11.5–14.5)
ERYTHROCYTE DISTRIBUTION WIDTH (RATIO) BY AUTOMATED COUNT: 13.2 % (ref 11.5–14.5)
ERYTHROCYTE DISTRIBUTION WIDTH (RATIO) BY AUTOMATED COUNT: 13.3 % (ref 11.5–14.5)
ERYTHROCYTE DISTRIBUTION WIDTH (RATIO) BY AUTOMATED COUNT: 13.4 % (ref 11.5–14.5)
ERYTHROCYTE DISTRIBUTION WIDTH (RATIO) BY AUTOMATED COUNT: 13.5 % (ref 11.5–14.5)
ERYTHROCYTE DISTRIBUTION WIDTH (RATIO) BY AUTOMATED COUNT: 13.7 % (ref 11.5–14.5)
ERYTHROCYTE DISTRIBUTION WIDTH (RATIO) BY AUTOMATED COUNT: 13.8 % (ref 11.5–14.5)
ERYTHROCYTE DISTRIBUTION WIDTH (RATIO) BY AUTOMATED COUNT: 13.9 % (ref 11.5–14.5)
ERYTHROCYTE DISTRIBUTION WIDTH (RATIO) BY AUTOMATED COUNT: 14.1 % (ref 11.5–14.5)
ERYTHROCYTE DISTRIBUTION WIDTH (RATIO) BY AUTOMATED COUNT: 14.5 % (ref 11.5–14.5)
ERYTHROCYTE DISTRIBUTION WIDTH (RATIO) BY AUTOMATED COUNT: 14.7 % (ref 11.5–14.5)
ERYTHROCYTE MEAN CORPUSCULAR HEMOGLOBIN CONCENTRATION (G/DL) BY AUTOMATED: 29.6 G/DL (ref 32–36)
ERYTHROCYTE MEAN CORPUSCULAR HEMOGLOBIN CONCENTRATION (G/DL) BY AUTOMATED: 29.7 G/DL (ref 32–36)
ERYTHROCYTE MEAN CORPUSCULAR HEMOGLOBIN CONCENTRATION (G/DL) BY AUTOMATED: 29.7 G/DL (ref 32–36)
ERYTHROCYTE MEAN CORPUSCULAR HEMOGLOBIN CONCENTRATION (G/DL) BY AUTOMATED: 29.8 G/DL (ref 32–36)
ERYTHROCYTE MEAN CORPUSCULAR HEMOGLOBIN CONCENTRATION (G/DL) BY AUTOMATED: 29.9 G/DL (ref 32–36)
ERYTHROCYTE MEAN CORPUSCULAR HEMOGLOBIN CONCENTRATION (G/DL) BY AUTOMATED: 29.9 G/DL (ref 32–36)
ERYTHROCYTE MEAN CORPUSCULAR HEMOGLOBIN CONCENTRATION (G/DL) BY AUTOMATED: 30 G/DL (ref 32–36)
ERYTHROCYTE MEAN CORPUSCULAR HEMOGLOBIN CONCENTRATION (G/DL) BY AUTOMATED: 30.2 G/DL (ref 32–36)
ERYTHROCYTE MEAN CORPUSCULAR HEMOGLOBIN CONCENTRATION (G/DL) BY AUTOMATED: 30.3 G/DL (ref 32–36)
ERYTHROCYTE MEAN CORPUSCULAR HEMOGLOBIN CONCENTRATION (G/DL) BY AUTOMATED: 30.4 G/DL (ref 32–36)
ERYTHROCYTE MEAN CORPUSCULAR HEMOGLOBIN CONCENTRATION (G/DL) BY AUTOMATED: 30.6 G/DL (ref 32–36)
ERYTHROCYTE MEAN CORPUSCULAR HEMOGLOBIN CONCENTRATION (G/DL) BY AUTOMATED: 30.6 G/DL (ref 32–36)
ERYTHROCYTE MEAN CORPUSCULAR HEMOGLOBIN CONCENTRATION (G/DL) BY AUTOMATED: 31 G/DL (ref 32–36)
ERYTHROCYTE MEAN CORPUSCULAR HEMOGLOBIN CONCENTRATION (G/DL) BY AUTOMATED: 31.4 G/DL (ref 32–36)
ERYTHROCYTE MEAN CORPUSCULAR VOLUME (FL) BY AUTOMATED COUNT: 91 FL (ref 80–100)
ERYTHROCYTE MEAN CORPUSCULAR VOLUME (FL) BY AUTOMATED COUNT: 92 FL (ref 80–100)
ERYTHROCYTE MEAN CORPUSCULAR VOLUME (FL) BY AUTOMATED COUNT: 93 FL (ref 80–100)
ERYTHROCYTE MEAN CORPUSCULAR VOLUME (FL) BY AUTOMATED COUNT: 94 FL (ref 80–100)
ERYTHROCYTE MEAN CORPUSCULAR VOLUME (FL) BY AUTOMATED COUNT: 95 FL (ref 80–100)
ERYTHROCYTE MEAN CORPUSCULAR VOLUME (FL) BY AUTOMATED COUNT: 96 FL (ref 80–100)
ERYTHROCYTE [DISTWIDTH] IN BLOOD BY AUTOMATED COUNT: 13.5 % (ref 11.5–14.5)
ERYTHROCYTE [DISTWIDTH] IN BLOOD BY AUTOMATED COUNT: 13.6 % (ref 11.5–14.5)
ERYTHROCYTE [DISTWIDTH] IN BLOOD BY AUTOMATED COUNT: 13.9 % (ref 11.5–14.5)
ERYTHROCYTE [DISTWIDTH] IN BLOOD BY AUTOMATED COUNT: 13.9 % (ref 11.5–14.5)
ERYTHROCYTE [DISTWIDTH] IN BLOOD BY AUTOMATED COUNT: 14 % (ref 11.5–14.5)
ERYTHROCYTE [DISTWIDTH] IN BLOOD BY AUTOMATED COUNT: 14.3 % (ref 11.5–14.5)
ERYTHROCYTES (10*6/UL) IN BLOOD BY AUTOMATED COUNT: 3.07 X10E12/L (ref 4.5–5.9)
ERYTHROCYTES (10*6/UL) IN BLOOD BY AUTOMATED COUNT: 3.07 X10E12/L (ref 4.5–5.9)
ERYTHROCYTES (10*6/UL) IN BLOOD BY AUTOMATED COUNT: 3.1 X10E12/L (ref 4.5–5.9)
ERYTHROCYTES (10*6/UL) IN BLOOD BY AUTOMATED COUNT: 3.2 X10E12/L (ref 4.5–5.9)
ERYTHROCYTES (10*6/UL) IN BLOOD BY AUTOMATED COUNT: 3.25 X10E12/L (ref 4.5–5.9)
ERYTHROCYTES (10*6/UL) IN BLOOD BY AUTOMATED COUNT: 3.27 X10E12/L (ref 4.5–5.9)
ERYTHROCYTES (10*6/UL) IN BLOOD BY AUTOMATED COUNT: 3.28 X10E12/L (ref 4.5–5.9)
ERYTHROCYTES (10*6/UL) IN BLOOD BY AUTOMATED COUNT: 3.28 X10E12/L (ref 4.5–5.9)
ERYTHROCYTES (10*6/UL) IN BLOOD BY AUTOMATED COUNT: 3.39 X10E12/L (ref 4.5–5.9)
ERYTHROCYTES (10*6/UL) IN BLOOD BY AUTOMATED COUNT: 3.44 X10E12/L (ref 4.5–5.9)
ERYTHROCYTES (10*6/UL) IN BLOOD BY AUTOMATED COUNT: 3.67 X10E12/L (ref 4.5–5.9)
ERYTHROCYTES (10*6/UL) IN BLOOD BY AUTOMATED COUNT: 3.97 X10E12/L (ref 4.5–5.9)
EST. AVERAGE GLUCOSE BLD GHB EST-MCNC: 151 MG/DL
ESTIMATED AVERAGE GLUCOSE FOR HBA1C: 174 MG/DL
ESTIMATED AVERAGE GLUCOSE FOR HBA1C: 189 MG/DL
FERRITIN (UG/LL) IN SER/PLAS: 321 UG/L (ref 20–300)
FERRITIN SERPL-MCNC: 150 NG/ML (ref 20–300)
FERRITIN SERPL-MCNC: 180 NG/ML (ref 20–300)
FLOW AUTOCROSSMATCH: NORMAL
FLOW AUTOCROSSMATCH: NORMAL
FOLATE (NG/ML) IN SER/PLAS: >22.3 NG/ML
FOLATE SERPL-MCNC: >22.3 NG/ML
GFR FEMALE: NORMAL
GFR MALE: 14 ML/MIN/1.73M2
GFR MALE: 20 ML/MIN/1.73M2
GFR MALE: 23 ML/MIN/1.73M2
GFR MALE: 25 ML/MIN/1.73M2
GFR MALE: 25 ML/MIN/1.73M2
GFR MALE: 26 ML/MIN/1.73M2
GFR MALE: 26 ML/MIN/1.73M2
GFR MALE: 27 ML/MIN/1.73M2
GFR MALE: 30 ML/MIN/1.73M2
GFR MALE: 31 ML/MIN/1.73M2
GFR MALE: NORMAL
GFR SERPL CREATININE-BSD FRML MDRD: 18 ML/MIN/1.73M*2
GFR SERPL CREATININE-BSD FRML MDRD: 21 ML/MIN/1.73M*2
GFR SERPL CREATININE-BSD FRML MDRD: 21 ML/MIN/1.73M*2
GFR SERPL CREATININE-BSD FRML MDRD: 22 ML/MIN/1.73M*2
GFR SERPL CREATININE-BSD FRML MDRD: 23 ML/MIN/1.73M*2
GFR SERPL CREATININE-BSD FRML MDRD: 23 ML/MIN/1.73M*2
GFR SERPL CREATININE-BSD FRML MDRD: 25 ML/MIN/1.73M*2
GLUCOSE (MG/DL) IN SER/PLAS: 104 MG/DL (ref 74–99)
GLUCOSE (MG/DL) IN SER/PLAS: 114 MG/DL (ref 74–99)
GLUCOSE (MG/DL) IN SER/PLAS: 123 MG/DL (ref 74–99)
GLUCOSE (MG/DL) IN SER/PLAS: 128 MG/DL (ref 74–99)
GLUCOSE (MG/DL) IN SER/PLAS: 129 MG/DL (ref 74–99)
GLUCOSE (MG/DL) IN SER/PLAS: 138 MG/DL (ref 74–99)
GLUCOSE (MG/DL) IN SER/PLAS: 155 MG/DL (ref 74–99)
GLUCOSE (MG/DL) IN SER/PLAS: 167 MG/DL (ref 74–99)
GLUCOSE (MG/DL) IN SER/PLAS: 187 MG/DL (ref 74–99)
GLUCOSE (MG/DL) IN SER/PLAS: 191 MG/DL (ref 74–99)
GLUCOSE (MG/DL) IN SER/PLAS: 218 MG/DL (ref 74–99)
GLUCOSE (MG/DL) IN SER/PLAS: 94 MG/DL (ref 74–99)
GLUCOSE (MG/DL) IN SER/PLAS: 99 MG/DL (ref 74–99)
GLUCOSE (MG/DL) IN SER/PLAS: NORMAL
GLUCOSE BLD MANUAL STRIP-MCNC: 106 MG/DL (ref 74–99)
GLUCOSE BLD MANUAL STRIP-MCNC: 117 MG/DL (ref 74–99)
GLUCOSE BLD MANUAL STRIP-MCNC: 118 MG/DL (ref 74–99)
GLUCOSE BLD MANUAL STRIP-MCNC: 155 MG/DL (ref 74–99)
GLUCOSE BLD MANUAL STRIP-MCNC: 165 MG/DL (ref 74–99)
GLUCOSE BLD MANUAL STRIP-MCNC: 166 MG/DL (ref 74–99)
GLUCOSE BLD MANUAL STRIP-MCNC: 167 MG/DL (ref 74–99)
GLUCOSE BLD MANUAL STRIP-MCNC: 172 MG/DL (ref 74–99)
GLUCOSE BLD MANUAL STRIP-MCNC: 178 MG/DL (ref 74–99)
GLUCOSE BLD MANUAL STRIP-MCNC: 272 MG/DL (ref 74–99)
GLUCOSE BLD MANUAL STRIP-MCNC: 88 MG/DL (ref 74–99)
GLUCOSE SERPL-MCNC: 105 MG/DL (ref 74–99)
GLUCOSE SERPL-MCNC: 129 MG/DL (ref 74–99)
GLUCOSE SERPL-MCNC: 158 MG/DL (ref 74–99)
GLUCOSE SERPL-MCNC: 160 MG/DL (ref 74–99)
GLUCOSE SERPL-MCNC: 167 MG/DL (ref 74–99)
GLUCOSE SERPL-MCNC: 177 MG/DL (ref 74–99)
GLUCOSE SERPL-MCNC: 94 MG/DL (ref 74–99)
GLUCOSE SERPL-MCNC: 98 MG/DL (ref 74–99)
GLUCOSE UR STRIP.AUTO-MCNC: NEGATIVE MG/DL
GLUCOSE UR STRIP.AUTO-MCNC: NEGATIVE MG/DL
HBA1C MFR BLD: 6.9 %
HBV CORE AB SER QL: NONREACTIVE
HBV SURFACE AB SER-ACNC: 7.9 MIU/ML
HBV SURFACE AG SERPL QL IA: NONREACTIVE
HCO3 BLDA-SCNC: 26.4 MMOL/L (ref 22–26)
HCT VFR BLD AUTO: 22.5 % (ref 41–52)
HCT VFR BLD AUTO: 23 % (ref 41–52)
HCT VFR BLD AUTO: 23.7 % (ref 41–52)
HCT VFR BLD AUTO: 25.9 % (ref 41–52)
HCT VFR BLD AUTO: 26.1 % (ref 41–52)
HCT VFR BLD AUTO: 26.1 % (ref 41–52)
HCT VFR BLD AUTO: 27.3 % (ref 41–52)
HCT VFR BLD AUTO: 28.7 % (ref 41–52)
HCT VFR BLD AUTO: 29.1 % (ref 41–52)
HCT VFR BLD AUTO: 29.2 % (ref 41–52)
HCV AB SER QL: NONREACTIVE
HEMATOCRIT (%) IN BLOOD BY AUTOMATED COUNT: 29 % (ref 41–52)
HEMATOCRIT (%) IN BLOOD BY AUTOMATED COUNT: 29.1 % (ref 41–52)
HEMATOCRIT (%) IN BLOOD BY AUTOMATED COUNT: 29.2 % (ref 41–52)
HEMATOCRIT (%) IN BLOOD BY AUTOMATED COUNT: 29.7 % (ref 41–52)
HEMATOCRIT (%) IN BLOOD BY AUTOMATED COUNT: 30 % (ref 41–52)
HEMATOCRIT (%) IN BLOOD BY AUTOMATED COUNT: 30.1 % (ref 41–52)
HEMATOCRIT (%) IN BLOOD BY AUTOMATED COUNT: 30.3 % (ref 41–52)
HEMATOCRIT (%) IN BLOOD BY AUTOMATED COUNT: 30.8 % (ref 41–52)
HEMATOCRIT (%) IN BLOOD BY AUTOMATED COUNT: 30.9 % (ref 41–52)
HEMATOCRIT (%) IN BLOOD BY AUTOMATED COUNT: 31.1 % (ref 41–52)
HEMATOCRIT (%) IN BLOOD BY AUTOMATED COUNT: 31.2 % (ref 41–52)
HEMATOCRIT (%) IN BLOOD BY AUTOMATED COUNT: 32 % (ref 41–52)
HEMATOCRIT (%) IN BLOOD BY AUTOMATED COUNT: 33.7 % (ref 41–52)
HEMATOCRIT (%) IN BLOOD BY AUTOMATED COUNT: 36.7 % (ref 41–52)
HEMOGLOBIN (G/DL) IN BLOOD: 10.2 G/DL (ref 13.5–17.5)
HEMOGLOBIN (G/DL) IN BLOOD: 11.1 G/DL (ref 13.5–17.5)
HEMOGLOBIN (G/DL) IN BLOOD: 8.6 G/DL (ref 13.5–17.5)
HEMOGLOBIN (G/DL) IN BLOOD: 8.6 G/DL (ref 13.5–17.5)
HEMOGLOBIN (G/DL) IN BLOOD: 8.7 G/DL (ref 13.5–17.5)
HEMOGLOBIN (G/DL) IN BLOOD: 9 G/DL (ref 13.5–17.5)
HEMOGLOBIN (G/DL) IN BLOOD: 9.1 G/DL (ref 13.5–17.5)
HEMOGLOBIN (G/DL) IN BLOOD: 9.2 G/DL (ref 13.5–17.5)
HEMOGLOBIN (G/DL) IN BLOOD: 9.2 G/DL (ref 13.5–17.5)
HEMOGLOBIN (G/DL) IN BLOOD: 9.3 G/DL (ref 13.5–17.5)
HEMOGLOBIN (G/DL) IN BLOOD: 9.4 G/DL (ref 13.5–17.5)
HEMOGLOBIN (G/DL) IN BLOOD: 9.4 G/DL (ref 13.5–17.5)
HEMOGLOBIN (G/DL) IN BLOOD: 9.5 G/DL (ref 13.5–17.5)
HEMOGLOBIN (G/DL) IN BLOOD: 9.8 G/DL (ref 13.5–17.5)
HEMOGLOBIN A1C/HEMOGLOBIN TOTAL IN BLOOD: 7.7 %
HEMOGLOBIN A1C/HEMOGLOBIN TOTAL IN BLOOD: 8.2 %
HGB BLD-MCNC: 6.9 G/DL (ref 13.5–17.5)
HGB BLD-MCNC: 7.2 G/DL (ref 13.5–17.5)
HGB BLD-MCNC: 7.2 G/DL (ref 13.5–17.5)
HGB BLD-MCNC: 7.7 G/DL (ref 13.5–17.5)
HGB BLD-MCNC: 7.7 G/DL (ref 13.5–17.5)
HGB BLD-MCNC: 8.1 G/DL (ref 13.5–17.5)
HGB BLD-MCNC: 8.3 G/DL (ref 13.5–17.5)
HGB BLD-MCNC: 8.6 G/DL (ref 13.5–17.5)
HGB BLD-MCNC: 8.8 G/DL (ref 13.5–17.5)
HGB BLD-MCNC: 8.8 G/DL (ref 13.5–17.5)
HIV 1+2 AB+HIV1 P24 AG SERPL QL IA: NONREACTIVE
HLA CLASS I AB SCREEN,FC: NORMAL
HLA CLASS I SP ANTIBODY IDENTIFICATION, HIGH DEFINITION: NORMAL
HLA CLASS II AB SCREEN,FC: NORMAL
HLA CLASS II SP ANTIBODY IDENTIFICATION, HIGH DEFINITION: NORMAL
HLA CLS I TYP PNL BLD/T DONR HIGH RES: NORMAL
HLA RESULTS: NORMAL
HLA-A+B+C AB NFR SER: NORMAL %
HLA-DP+DQ+DR AB NFR SER: NORMAL %
HLA-DP2 QL: NORMAL
HLA-DQB1 HIGH RES: NORMAL
HLA-DRB1 HIGH RES: NORMAL
HOLD SPECIMEN: NORMAL
HYALINE CASTS #/AREA URNS AUTO: ABNORMAL /LPF
HYALINE CASTS #/AREA URNS AUTO: ABNORMAL /LPF
IMM GRANULOCYTES # BLD AUTO: 0.03 X10*3/UL (ref 0–0.7)
IMM GRANULOCYTES # BLD AUTO: 0.03 X10*3/UL (ref 0–0.7)
IMM GRANULOCYTES # BLD AUTO: 0.05 X10*3/UL (ref 0–0.7)
IMM GRANULOCYTES # BLD AUTO: 0.05 X10*3/UL (ref 0–0.7)
IMM GRANULOCYTES NFR BLD AUTO: 0.3 % (ref 0–0.9)
IMM GRANULOCYTES NFR BLD AUTO: 0.3 % (ref 0–0.9)
IMM GRANULOCYTES NFR BLD AUTO: 0.4 % (ref 0–0.9)
IMM GRANULOCYTES NFR BLD AUTO: 0.4 % (ref 0–0.9)
IMMATURE GRANULOCYTES/100 LEUKOCYTES IN BLOOD BY AUTOMATED COUNT: 0.4 % (ref 0–0.9)
INHALED O2 CONCENTRATION: 21 %
INR PPP: 1.9 (ref 0.9–1.1)
IRON (UG/DL) IN SER/PLAS: 100 UG/DL (ref 35–150)
IRON BINDING CAPACITY (UG/DL) IN SER/PLAS: 283 UG/DL (ref 240–445)
IRON SATN MFR SERPL: 20 % (ref 25–45)
IRON SATN MFR SERPL: 29 % (ref 25–45)
IRON SATURATION (%) IN SER/PLAS: 35 % (ref 25–45)
IRON SERPL-MCNC: 43 UG/DL (ref 35–150)
IRON SERPL-MCNC: 68 UG/DL (ref 35–150)
KETONES UR STRIP.AUTO-MCNC: NEGATIVE MG/DL
KETONES UR STRIP.AUTO-MCNC: NEGATIVE MG/DL
LACTATE SERPL-SCNC: 1.5 MMOL/L (ref 0.4–2)
LEUKOCYTE ESTERASE UR QL STRIP.AUTO: NEGATIVE
LEUKOCYTE ESTERASE UR QL STRIP.AUTO: NEGATIVE
LEUKOCYTES (10*3/UL) IN BLOOD BY AUTOMATED COUNT: 10.3 X10E9/L (ref 4.4–11.3)
LEUKOCYTES (10*3/UL) IN BLOOD BY AUTOMATED COUNT: 10.3 X10E9/L (ref 4.4–11.3)
LEUKOCYTES (10*3/UL) IN BLOOD BY AUTOMATED COUNT: 10.6 X10E9/L (ref 4.4–11.3)
LEUKOCYTES (10*3/UL) IN BLOOD BY AUTOMATED COUNT: 10.7 X10E9/L (ref 4.4–11.3)
LEUKOCYTES (10*3/UL) IN BLOOD BY AUTOMATED COUNT: 11.5 X10E9/L (ref 4.4–11.3)
LEUKOCYTES (10*3/UL) IN BLOOD BY AUTOMATED COUNT: 7.3 X10E9/L (ref 4.4–11.3)
LEUKOCYTES (10*3/UL) IN BLOOD BY AUTOMATED COUNT: 8.5 X10E9/L (ref 4.4–11.3)
LEUKOCYTES (10*3/UL) IN BLOOD BY AUTOMATED COUNT: 8.6 X10E9/L (ref 4.4–11.3)
LEUKOCYTES (10*3/UL) IN BLOOD BY AUTOMATED COUNT: 8.8 X10E9/L (ref 4.4–11.3)
LEUKOCYTES (10*3/UL) IN BLOOD BY AUTOMATED COUNT: 8.9 X10E9/L (ref 4.4–11.3)
LEUKOCYTES (10*3/UL) IN BLOOD BY AUTOMATED COUNT: 9 X10E9/L (ref 4.4–11.3)
LEUKOCYTES (10*3/UL) IN BLOOD BY AUTOMATED COUNT: 9.4 X10E9/L (ref 4.4–11.3)
LEUKOCYTES (10*3/UL) IN BLOOD BY AUTOMATED COUNT: 9.8 X10E9/L (ref 4.4–11.3)
LEUKOCYTES (10*3/UL) IN BLOOD BY AUTOMATED COUNT: 9.8 X10E9/L (ref 4.4–11.3)
LYMPHOCYTES # BLD AUTO: 0.91 X10*3/UL (ref 1.2–4.8)
LYMPHOCYTES # BLD AUTO: 0.98 X10*3/UL (ref 1.2–4.8)
LYMPHOCYTES # BLD AUTO: 1.22 X10*3/UL (ref 1.2–4.8)
LYMPHOCYTES # BLD AUTO: 1.51 X10*3/UL (ref 1.2–4.8)
LYMPHOCYTES (10*3/UL) IN BLOOD BY AUTOMATED COUNT: 1.39 X10E9/L (ref 1.2–4.8)
LYMPHOCYTES NFR BLD AUTO: 10.2 %
LYMPHOCYTES NFR BLD AUTO: 10.5 %
LYMPHOCYTES NFR BLD AUTO: 15.3 %
LYMPHOCYTES NFR BLD AUTO: 7.6 %
LYMPHOCYTES/100 LEUKOCYTES IN BLOOD BY AUTOMATED COUNT: 14.3 % (ref 13–44)
MAGNESIUM SERPL-MCNC: 1.1 MG/DL (ref 1.6–2.4)
MAGNESIUM SERPL-MCNC: 1.32 MG/DL (ref 1.6–2.4)
MAGNESIUM SERPL-MCNC: 1.41 MG/DL (ref 1.6–2.4)
MAGNESIUM SERPL-MCNC: 1.48 MG/DL (ref 1.6–2.4)
MAGNESIUM SERPL-MCNC: 1.55 MG/DL (ref 1.6–2.4)
MAGNESIUM SERPL-MCNC: 1.56 MG/DL (ref 1.6–2.4)
MAGNESIUM SERPL-MCNC: 1.76 MG/DL (ref 1.6–2.4)
MCH RBC QN AUTO: 27.9 PG (ref 26–34)
MCH RBC QN AUTO: 28.2 PG (ref 26–34)
MCH RBC QN AUTO: 28.3 PG (ref 26–34)
MCH RBC QN AUTO: 28.4 PG (ref 26–34)
MCH RBC QN AUTO: 28.4 PG (ref 26–34)
MCH RBC QN AUTO: 28.6 PG (ref 26–34)
MCH RBC QN AUTO: 28.8 PG (ref 26–34)
MCH RBC QN AUTO: 28.8 PG (ref 26–34)
MCH RBC QN AUTO: 29 PG (ref 26–34)
MCH RBC QN AUTO: 29.3 PG (ref 26–34)
MCHC RBC AUTO-ENTMCNC: 29.5 G/DL (ref 32–36)
MCHC RBC AUTO-ENTMCNC: 29.5 G/DL (ref 32–36)
MCHC RBC AUTO-ENTMCNC: 30 G/DL (ref 32–36)
MCHC RBC AUTO-ENTMCNC: 30.1 G/DL (ref 32–36)
MCHC RBC AUTO-ENTMCNC: 30.2 G/DL (ref 32–36)
MCHC RBC AUTO-ENTMCNC: 30.4 G/DL (ref 32–36)
MCHC RBC AUTO-ENTMCNC: 30.4 G/DL (ref 32–36)
MCHC RBC AUTO-ENTMCNC: 30.7 G/DL (ref 32–36)
MCHC RBC AUTO-ENTMCNC: 31.3 G/DL (ref 32–36)
MCHC RBC AUTO-ENTMCNC: 31.3 G/DL (ref 32–36)
MCV RBC AUTO: 92 FL (ref 80–100)
MCV RBC AUTO: 92 FL (ref 80–100)
MCV RBC AUTO: 93 FL (ref 80–100)
MCV RBC AUTO: 93 FL (ref 80–100)
MCV RBC AUTO: 94 FL (ref 80–100)
MCV RBC AUTO: 94 FL (ref 80–100)
MCV RBC AUTO: 95 FL (ref 80–100)
MCV RBC AUTO: 96 FL (ref 80–100)
MICROALBUMIN UR-MCNC: 1506.6 MG/L
MICROALBUMIN/CREAT UR: 1668.4 UG/MG CREAT
MONOCYTES # BLD AUTO: 0.69 X10*3/UL (ref 0.1–1)
MONOCYTES # BLD AUTO: 0.71 X10*3/UL (ref 0.1–1)
MONOCYTES # BLD AUTO: 0.82 X10*3/UL (ref 0.1–1)
MONOCYTES # BLD AUTO: 1.02 X10*3/UL (ref 0.1–1)
MONOCYTES (10*3/UL) IN BLOOD BY AUTOMATED COUNT: 0.91 X10E9/L (ref 0.1–1)
MONOCYTES NFR BLD AUTO: 5.8 %
MONOCYTES NFR BLD AUTO: 7.2 %
MONOCYTES NFR BLD AUTO: 8.5 %
MONOCYTES NFR BLD AUTO: 8.7 %
MONOCYTES/100 LEUKOCYTES IN BLOOD BY AUTOMATED COUNT: 9.4 % (ref 2–10)
MUCOUS THREADS #/AREA URNS AUTO: ABNORMAL /LPF
NATRIURETIC PEPTIDE B (PG/ML) IN SER/PLAS: 108 PG/ML (ref 0–99)
NEUTROPHILS # BLD AUTO: 10.01 X10*3/UL (ref 1.2–7.7)
NEUTROPHILS # BLD AUTO: 7.36 X10*3/UL (ref 1.2–7.7)
NEUTROPHILS # BLD AUTO: 7.6 X10*3/UL (ref 1.2–7.7)
NEUTROPHILS # BLD AUTO: 9.11 X10*3/UL (ref 1.2–7.7)
NEUTROPHILS (10*3/UL) IN BLOOD BY AUTOMATED COUNT: 7.1 X10E9/L (ref 1.2–7.7)
NEUTROPHILS NFR BLD AUTO: 74.3 %
NEUTROPHILS NFR BLD AUTO: 78.1 %
NEUTROPHILS NFR BLD AUTO: 78.8 %
NEUTROPHILS NFR BLD AUTO: 84.1 %
NEUTROPHILS/100 LEUKOCYTES IN BLOOD BY AUTOMATED COUNT: 73.3 % (ref 40–80)
NICOTINE SERPL-MCNC: <5 NG/ML
NIL(NEG) CONTROL SPOT COUNT: NORMAL
NITRITE UR QL STRIP.AUTO: NEGATIVE
NITRITE UR QL STRIP.AUTO: NEGATIVE
NOROVIRUS GI/GII: NOT DETECTED
NRBC BLD-RTO: 0 /100 WBCS (ref 0–0)
OXYHGB MFR BLDA: 94.4 % (ref 94–98)
P AXIS: 62 DEGREES
P OFFSET: 190 MS
P ONSET: 140 MS
PANEL A SPOT COUNT: 0
PANEL B SPOT COUNT: 0
PCO2 BLDA: 38 MM HG (ref 38–42)
PH BLDA: 7.45 PH (ref 7.38–7.42)
PH UR STRIP.AUTO: 5 [PH]
PH UR STRIP.AUTO: 5 [PH]
PHOSPHATE (MG/DL) IN SER/PLAS: 3 MG/DL (ref 2.5–4.9)
PHOSPHATE (MG/DL) IN SER/PLAS: 3.4 MG/DL (ref 2.5–4.9)
PHOSPHATE (MG/DL) IN SER/PLAS: 3.9 MG/DL (ref 2.5–4.9)
PHOSPHATE (MG/DL) IN SER/PLAS: 4 MG/DL (ref 2.5–4.9)
PHOSPHATE (MG/DL) IN SER/PLAS: 4 MG/DL (ref 2.5–4.9)
PHOSPHATE (MG/DL) IN SER/PLAS: 4.1 MG/DL (ref 2.5–4.9)
PHOSPHATE (MG/DL) IN SER/PLAS: 4.3 MG/DL (ref 2.5–4.9)
PHOSPHATE (MG/DL) IN SER/PLAS: 4.4 MG/DL (ref 2.5–4.9)
PHOSPHATE (MG/DL) IN SER/PLAS: 4.4 MG/DL (ref 2.5–4.9)
PHOSPHATE (MG/DL) IN SER/PLAS: 4.7 MG/DL (ref 2.5–4.9)
PHOSPHATE (MG/DL) IN SER/PLAS: 5 MG/DL (ref 2.5–4.9)
PHOSPHATE (MG/DL) IN SER/PLAS: NORMAL
PHOSPHATE SERPL-MCNC: 2.7 MG/DL (ref 2.5–4.9)
PHOSPHATE SERPL-MCNC: 3.1 MG/DL (ref 2.5–4.9)
PHOSPHATE SERPL-MCNC: 3.9 MG/DL (ref 2.5–4.9)
PHOSPHATE SERPL-MCNC: 4 MG/DL (ref 2.5–4.9)
PHOSPHATE SERPL-MCNC: 4.2 MG/DL (ref 2.5–4.9)
PLATELET # BLD AUTO: 263 X10*3/UL (ref 150–450)
PLATELET # BLD AUTO: 265 X10*3/UL (ref 150–450)
PLATELET # BLD AUTO: 267 X10*3/UL (ref 150–450)
PLATELET # BLD AUTO: 276 X10*3/UL (ref 150–450)
PLATELET # BLD AUTO: 279 X10*3/UL (ref 150–450)
PLATELET # BLD AUTO: 291 X10*3/UL (ref 150–450)
PLATELET # BLD AUTO: 291 X10*3/UL (ref 150–450)
PLATELET # BLD AUTO: 297 X10*3/UL (ref 150–450)
PLATELET # BLD AUTO: 297 X10*3/UL (ref 150–450)
PLATELET # BLD AUTO: 317 X10*3/UL (ref 150–450)
PLATELETS (10*3/UL) IN BLOOD AUTOMATED COUNT: 221 X10E9/L (ref 150–450)
PLATELETS (10*3/UL) IN BLOOD AUTOMATED COUNT: 224 X10E9/L (ref 150–450)
PLATELETS (10*3/UL) IN BLOOD AUTOMATED COUNT: 224 X10E9/L (ref 150–450)
PLATELETS (10*3/UL) IN BLOOD AUTOMATED COUNT: 229 X10E9/L (ref 150–450)
PLATELETS (10*3/UL) IN BLOOD AUTOMATED COUNT: 240 X10E9/L (ref 150–450)
PLATELETS (10*3/UL) IN BLOOD AUTOMATED COUNT: 241 X10E9/L (ref 150–450)
PLATELETS (10*3/UL) IN BLOOD AUTOMATED COUNT: 241 X10E9/L (ref 150–450)
PLATELETS (10*3/UL) IN BLOOD AUTOMATED COUNT: 243 X10E9/L (ref 150–450)
PLATELETS (10*3/UL) IN BLOOD AUTOMATED COUNT: 259 X10E9/L (ref 150–450)
PLATELETS (10*3/UL) IN BLOOD AUTOMATED COUNT: 259 X10E9/L (ref 150–450)
PLATELETS (10*3/UL) IN BLOOD AUTOMATED COUNT: 263 X10E9/L (ref 150–450)
PLATELETS (10*3/UL) IN BLOOD AUTOMATED COUNT: 267 X10E9/L (ref 150–450)
PLATELETS (10*3/UL) IN BLOOD AUTOMATED COUNT: 270 X10E9/L (ref 150–450)
PLATELETS (10*3/UL) IN BLOOD AUTOMATED COUNT: 294 X10E9/L (ref 150–450)
PMV BLD AUTO: 10.6 FL (ref 7.5–11.5)
PMV BLD AUTO: 11.1 FL (ref 7.5–11.5)
PO2 BLDA: 69 MM HG (ref 85–95)
POS CONTROL SPOT COUNT: NORMAL
POTASSIUM (MMOL/L) IN SER/PLAS: 4.6 MMOL/L (ref 3.5–5.3)
POTASSIUM (MMOL/L) IN SER/PLAS: 4.6 MMOL/L (ref 3.5–5.3)
POTASSIUM (MMOL/L) IN SER/PLAS: 4.7 MMOL/L (ref 3.5–5.3)
POTASSIUM (MMOL/L) IN SER/PLAS: 4.8 MMOL/L (ref 3.5–5.3)
POTASSIUM (MMOL/L) IN SER/PLAS: 4.8 MMOL/L (ref 3.5–5.3)
POTASSIUM (MMOL/L) IN SER/PLAS: 4.9 MMOL/L (ref 3.5–5.3)
POTASSIUM (MMOL/L) IN SER/PLAS: 4.9 MMOL/L (ref 3.5–5.3)
POTASSIUM (MMOL/L) IN SER/PLAS: 5 MMOL/L (ref 3.5–5.3)
POTASSIUM (MMOL/L) IN SER/PLAS: 5 MMOL/L (ref 3.5–5.3)
POTASSIUM (MMOL/L) IN SER/PLAS: 5.2 MMOL/L (ref 3.5–5.3)
POTASSIUM (MMOL/L) IN SER/PLAS: 5.2 MMOL/L (ref 3.5–5.3)
POTASSIUM (MMOL/L) IN SER/PLAS: 5.3 MMOL/L (ref 3.5–5.3)
POTASSIUM (MMOL/L) IN SER/PLAS: 5.3 MMOL/L (ref 3.5–5.3)
POTASSIUM (MMOL/L) IN SER/PLAS: NORMAL
POTASSIUM SERPL-SCNC: 3.8 MMOL/L (ref 3.5–5.3)
POTASSIUM SERPL-SCNC: 4 MMOL/L (ref 3.5–5.3)
POTASSIUM SERPL-SCNC: 4.1 MMOL/L (ref 3.5–5.3)
POTASSIUM SERPL-SCNC: 4.1 MMOL/L (ref 3.5–5.3)
POTASSIUM SERPL-SCNC: 4.4 MMOL/L (ref 3.5–5.3)
POTASSIUM SERPL-SCNC: 4.4 MMOL/L (ref 3.5–5.3)
POTASSIUM SERPL-SCNC: 5.1 MMOL/L (ref 3.5–5.3)
POTASSIUM SERPL-SCNC: 5.1 MMOL/L (ref 3.5–5.3)
PR INTERVAL: 164 MS
PRODUCT BLOOD TYPE: 9500
PRODUCT CODE: NORMAL
PROT SERPL-MCNC: 5.3 G/DL (ref 6.4–8.2)
PROT SERPL-MCNC: 5.4 G/DL (ref 6.4–8.2)
PROT SERPL-MCNC: 5.6 G/DL (ref 6.4–8.2)
PROT SERPL-MCNC: 5.8 G/DL (ref 6.4–8.2)
PROT SERPL-MCNC: 5.9 G/DL (ref 6.4–8.2)
PROT UR STRIP.AUTO-MCNC: ABNORMAL MG/DL
PROT UR STRIP.AUTO-MCNC: ABNORMAL MG/DL
PROT UR-ACNC: 210 MG/DL (ref 5–25)
PROT/CREAT UR: 2.28 MG/MG CREAT (ref 0–0.17)
PROTEIN (MG/DL) IN URINE: 129 MG/DL (ref 5–25)
PROTEIN (MG/DL) IN URINE: 148 MG/DL (ref 5–25)
PROTEIN (MG/DL) IN URINE: 171 MG/DL (ref 5–25)
PROTEIN (MG/DL) IN URINE: 249 MG/DL (ref 5–25)
PROTEIN (MG/DL) IN URINE: NORMAL
PROTEIN/CREATININE (MG/MG) IN URINE: 1.28 MG/MG CREAT (ref 0–0.17)
PROTEIN/CREATININE (MG/MG) IN URINE: 1.28 MG/MG CREAT (ref 0–0.17)
PROTEIN/CREATININE (MG/MG) IN URINE: 2.09 MG/MG CREAT (ref 0–0.17)
PROTEIN/CREATININE (MG/MG) IN URINE: 2.38 MG/MG CREAT (ref 0–0.17)
PROTEIN/CREATININE (MG/MG) IN URINE: NORMAL
PROTHROMBIN TIME: 21.5 SECONDS (ref 9.8–12.8)
PSA SERPL-MCNC: 1.54 NG/ML
PTH-INTACT SERPL-MCNC: 323.2 PG/ML (ref 18.5–88)
Q ONSET: 222 MS
QRS COUNT: 13 BEATS
QRS DURATION: 78 MS
QT INTERVAL: 380 MS
QTC CALCULATION(BAZETT): 438 MS
QTC FREDERICIA: 418 MS
R AXIS: 49 DEGREES
RBC # BLD AUTO: 2.41 X10*6/UL (ref 4.5–5.9)
RBC # BLD AUTO: 2.46 X10*6/UL (ref 4.5–5.9)
RBC # BLD AUTO: 2.54 X10*6/UL (ref 4.5–5.9)
RBC # BLD AUTO: 2.71 X10*6/UL (ref 4.5–5.9)
RBC # BLD AUTO: 2.71 X10*6/UL (ref 4.5–5.9)
RBC # BLD AUTO: 2.81 X10*6/UL (ref 4.5–5.9)
RBC # BLD AUTO: 2.88 X10*6/UL (ref 4.5–5.9)
RBC # BLD AUTO: 3.03 X10*6/UL (ref 4.5–5.9)
RBC # BLD AUTO: 3.05 X10*6/UL (ref 4.5–5.9)
RBC # BLD AUTO: 3.15 X10*6/UL (ref 4.5–5.9)
RBC # UR STRIP.AUTO: ABNORMAL /UL
RBC # UR STRIP.AUTO: NEGATIVE /UL
RBC #/AREA URNS AUTO: ABNORMAL /HPF
RBC #/AREA URNS AUTO: ABNORMAL /HPF
RH FACTOR (ANTIGEN D): NORMAL
RH FACTOR (ANTIGEN D): NORMAL
ROTAVIRUS A: NOT DETECTED
SALMONELLA SP.: NOT DETECTED
SAO2 % BLDA: 95 % (ref 94–100)
SCAN RESULT: NORMAL
SHIGA TOXIN 1: NOT DETECTED
SHIGA TOXIN 2: NOT DETECTED
SHIGELLA SP.: NOT DETECTED
SODIUM (MMOL/L) IN SER/PLAS: 140 MMOL/L (ref 136–145)
SODIUM (MMOL/L) IN SER/PLAS: 140 MMOL/L (ref 136–145)
SODIUM (MMOL/L) IN SER/PLAS: 141 MMOL/L (ref 136–145)
SODIUM (MMOL/L) IN SER/PLAS: 142 MMOL/L (ref 136–145)
SODIUM (MMOL/L) IN SER/PLAS: 142 MMOL/L (ref 136–145)
SODIUM (MMOL/L) IN SER/PLAS: 143 MMOL/L (ref 136–145)
SODIUM (MMOL/L) IN SER/PLAS: 144 MMOL/L (ref 136–145)
SODIUM (MMOL/L) IN SER/PLAS: 146 MMOL/L (ref 136–145)
SODIUM (MMOL/L) IN SER/PLAS: 146 MMOL/L (ref 136–145)
SODIUM (MMOL/L) IN SER/PLAS: NORMAL
SODIUM SERPL-SCNC: 144 MMOL/L (ref 136–145)
SODIUM SERPL-SCNC: 145 MMOL/L (ref 136–145)
SODIUM SERPL-SCNC: 146 MMOL/L (ref 136–145)
SODIUM SERPL-SCNC: 147 MMOL/L (ref 136–145)
SODIUM SERPL-SCNC: 147 MMOL/L (ref 136–145)
SODIUM SERPL-SCNC: 148 MMOL/L (ref 136–145)
SP GR UR STRIP.AUTO: 1.01
SP GR UR STRIP.AUTO: 1.01
SPECIMEN DRAWN FROM PATIENT: ABNORMAL
SQUAMOUS #/AREA URNS AUTO: ABNORMAL /HPF
T AXIS: 80 DEGREES
T OFFSET: 412 MS
T-SPOT. TB INTERPRETATION: NEGATIVE
T4 FREE SERPL-MCNC: 0.7 NG/DL (ref 0.61–1.12)
T4 FREE SERPL-MCNC: 0.85 NG/DL (ref 0.61–1.12)
TACROLIMUS (NG/ML) IN BLOOD: 3.8 NG/ML (ref 2–15)
TACROLIMUS (NG/ML) IN BLOOD: 4.2 NG/ML (ref 2–15)
TACROLIMUS (NG/ML) IN BLOOD: 4.2 NG/ML (ref 2–15)
TACROLIMUS (NG/ML) IN BLOOD: 4.3 NG/ML (ref 2–15)
TACROLIMUS (NG/ML) IN BLOOD: 5.2 NG/ML (ref 2–15)
TACROLIMUS (NG/ML) IN BLOOD: 5.4 NG/ML (ref 2–15)
TACROLIMUS (NG/ML) IN BLOOD: 5.9 NG/ML (ref 2–15)
TACROLIMUS (NG/ML) IN BLOOD: 6.2 NG/ML (ref 2–15)
TACROLIMUS (NG/ML) IN BLOOD: 6.4 NG/ML (ref 2–15)
TACROLIMUS (NG/ML) IN BLOOD: 6.4 NG/ML (ref 2–15)
TACROLIMUS (NG/ML) IN BLOOD: 6.7 NG/ML (ref 2–15)
TACROLIMUS (NG/ML) IN BLOOD: 7.2 NG/ML (ref 2–15)
TACROLIMUS (NG/ML) IN BLOOD: 7.5 NG/ML (ref 2–15)
TACROLIMUS (NG/ML) IN BLOOD: 7.6 NG/ML (ref 2–15)
TACROLIMUS BLD-MCNC: 11.9 NG/ML
TACROLIMUS BLD-MCNC: 3.4 NG/ML
TACROLIMUS BLD-MCNC: 5.1 NG/ML
TACROLIMUS BLD-MCNC: 6.8 NG/ML
TACROLIMUS BLD-MCNC: 6.8 NG/ML
TACROLIMUS BLD-MCNC: 6.9 NG/ML
TIBC SERPL-MCNC: 212 UG/DL (ref 240–445)
TIBC SERPL-MCNC: 238 UG/DL (ref 240–445)
TSH SERPL-ACNC: 12.69 MIU/L (ref 0.44–3.98)
TSH SERPL-ACNC: 31.12 MIU/L (ref 0.44–3.98)
UIBC SERPL-MCNC: 169 UG/DL (ref 110–370)
UIBC SERPL-MCNC: 170 UG/DL (ref 110–370)
UNIT ABO: NORMAL
UNIT NUMBER: NORMAL
UNIT RH: NORMAL
UNIT VOLUME: 350
URATE SERPL-MCNC: 9.4 MG/DL (ref 4–7.5)
UREA NITROGEN (MG/DL) IN SER/PLAS: 51 MG/DL (ref 6–23)
UREA NITROGEN (MG/DL) IN SER/PLAS: 53 MG/DL (ref 6–23)
UREA NITROGEN (MG/DL) IN SER/PLAS: 57 MG/DL (ref 6–23)
UREA NITROGEN (MG/DL) IN SER/PLAS: 59 MG/DL (ref 6–23)
UREA NITROGEN (MG/DL) IN SER/PLAS: 61 MG/DL (ref 6–23)
UREA NITROGEN (MG/DL) IN SER/PLAS: 64 MG/DL (ref 6–23)
UREA NITROGEN (MG/DL) IN SER/PLAS: 66 MG/DL (ref 6–23)
UREA NITROGEN (MG/DL) IN SER/PLAS: 67 MG/DL (ref 6–23)
UREA NITROGEN (MG/DL) IN SER/PLAS: 71 MG/DL (ref 6–23)
UREA NITROGEN (MG/DL) IN SER/PLAS: 72 MG/DL (ref 6–23)
UREA NITROGEN (MG/DL) IN SER/PLAS: 73 MG/DL (ref 6–23)
UREA NITROGEN (MG/DL) IN SER/PLAS: NORMAL
UROBILINOGEN UR STRIP.AUTO-MCNC: <2 MG/DL
UROBILINOGEN UR STRIP.AUTO-MCNC: <2 MG/DL
VENTRICULAR RATE: 80 BPM
VIBRIO GRP.: NOT DETECTED
VIT B12 SERPL-MCNC: 524 PG/ML (ref 211–911)
VZV QPCR,QUANT,PLASMA, VIRC: NOT DETECTED COPIES/ML
WBC # BLD AUTO: 10.3 X10*3/UL (ref 4.4–11.3)
WBC # BLD AUTO: 11.7 X10*3/UL (ref 4.4–11.3)
WBC # BLD AUTO: 11.9 X10*3/UL (ref 4.4–11.3)
WBC # BLD AUTO: 8.2 X10*3/UL (ref 4.4–11.3)
WBC # BLD AUTO: 8.3 X10*3/UL (ref 4.4–11.3)
WBC # BLD AUTO: 8.4 X10*3/UL (ref 4.4–11.3)
WBC # BLD AUTO: 8.7 X10*3/UL (ref 4.4–11.3)
WBC # BLD AUTO: 9.6 X10*3/UL (ref 4.4–11.3)
WBC # BLD AUTO: 9.9 X10*3/UL (ref 4.4–11.3)
WBC # BLD AUTO: 9.9 X10*3/UL (ref 4.4–11.3)
WBC #/AREA URNS AUTO: ABNORMAL /HPF
WBC #/AREA URNS AUTO: ABNORMAL /HPF
XM INTEP: NORMAL
YERSINIA ENTEROCOLITICA: NOT DETECTED

## 2023-01-01 PROCEDURE — 2500000004 HC RX 250 GENERAL PHARMACY W/ HCPCS (ALT 636 FOR OP/ED): Performed by: INTERNAL MEDICINE

## 2023-01-01 PROCEDURE — 3079F DIAST BP 80-89 MM HG: CPT | Performed by: INTERNAL MEDICINE

## 2023-01-01 PROCEDURE — 1160F RVW MEDS BY RX/DR IN RCRD: CPT | Performed by: STUDENT IN AN ORGANIZED HEALTH CARE EDUCATION/TRAINING PROGRAM

## 2023-01-01 PROCEDURE — 2500000004 HC RX 250 GENERAL PHARMACY W/ HCPCS (ALT 636 FOR OP/ED): Performed by: NURSE PRACTITIONER

## 2023-01-01 PROCEDURE — 2500000002 HC RX 250 W HCPCS SELF ADMINISTERED DRUGS (ALT 637 FOR MEDICARE OP, ALT 636 FOR OP/ED): Performed by: INTERNAL MEDICINE

## 2023-01-01 PROCEDURE — 1036F TOBACCO NON-USER: CPT | Performed by: INTERNAL MEDICINE

## 2023-01-01 PROCEDURE — 81001 URINALYSIS AUTO W/SCOPE: CPT

## 2023-01-01 PROCEDURE — 99152 MOD SED SAME PHYS/QHP 5/>YRS: CPT | Performed by: HOSPITALIST

## 2023-01-01 PROCEDURE — 75571 CT HRT W/O DYE W/CA TEST: CPT | Mod: MG

## 2023-01-01 PROCEDURE — 86663 EPSTEIN-BARR ANTIBODY: CPT

## 2023-01-01 PROCEDURE — 82728 ASSAY OF FERRITIN: CPT

## 2023-01-01 PROCEDURE — 3062F POS MACROALBUMINURIA REV: CPT | Performed by: INTERNAL MEDICINE

## 2023-01-01 PROCEDURE — 1036F TOBACCO NON-USER: CPT | Performed by: HOSPITALIST

## 2023-01-01 PROCEDURE — 1100000001 HC PRIVATE ROOM DAILY

## 2023-01-01 PROCEDURE — 3074F SYST BP LT 130 MM HG: CPT | Performed by: INTERNAL MEDICINE

## 2023-01-01 PROCEDURE — 82947 ASSAY GLUCOSE BLOOD QUANT: CPT

## 2023-01-01 PROCEDURE — 36415 COLL VENOUS BLD VENIPUNCTURE: CPT | Performed by: INTERNAL MEDICINE

## 2023-01-01 PROCEDURE — 2500000002 HC RX 250 W HCPCS SELF ADMINISTERED DRUGS (ALT 637 FOR MEDICARE OP, ALT 636 FOR OP/ED): Performed by: HOSPITALIST

## 2023-01-01 PROCEDURE — 99214 OFFICE O/P EST MOD 30 MIN: CPT | Performed by: INTERNAL MEDICINE

## 2023-01-01 PROCEDURE — 82043 UR ALBUMIN QUANTITATIVE: CPT

## 2023-01-01 PROCEDURE — 85025 COMPLETE CBC W/AUTO DIFF WBC: CPT

## 2023-01-01 PROCEDURE — 1159F MED LIST DOCD IN RCRD: CPT | Performed by: INTERNAL MEDICINE

## 2023-01-01 PROCEDURE — 1126F AMNT PAIN NOTED NONE PRSNT: CPT | Performed by: INTERNAL MEDICINE

## 2023-01-01 PROCEDURE — 36415 COLL VENOUS BLD VENIPUNCTURE: CPT

## 2023-01-01 PROCEDURE — 2500000004 HC RX 250 GENERAL PHARMACY W/ HCPCS (ALT 636 FOR OP/ED): Performed by: HOSPITALIST

## 2023-01-01 PROCEDURE — 99233 SBSQ HOSP IP/OBS HIGH 50: CPT | Performed by: INTERNAL MEDICINE

## 2023-01-01 PROCEDURE — 3008F BODY MASS INDEX DOCD: CPT | Performed by: INTERNAL MEDICINE

## 2023-01-01 PROCEDURE — C1894 INTRO/SHEATH, NON-LASER: HCPCS | Performed by: HOSPITALIST

## 2023-01-01 PROCEDURE — 3066F NEPHROPATHY DOC TX: CPT | Performed by: INTERNAL MEDICINE

## 2023-01-01 PROCEDURE — 93922 UPR/L XTREMITY ART 2 LEVELS: CPT

## 2023-01-01 PROCEDURE — 99222 1ST HOSP IP/OBS MODERATE 55: CPT | Performed by: NURSE PRACTITIONER

## 2023-01-01 PROCEDURE — 83735 ASSAY OF MAGNESIUM: CPT | Performed by: EMERGENCY MEDICINE

## 2023-01-01 PROCEDURE — 86664 EPSTEIN-BARR NUCLEAR ANTIGEN: CPT

## 2023-01-01 PROCEDURE — 1126F AMNT PAIN NOTED NONE PRSNT: CPT | Performed by: HOSPITALIST

## 2023-01-01 PROCEDURE — 84100 ASSAY OF PHOSPHORUS: CPT | Performed by: PHYSICIAN ASSISTANT

## 2023-01-01 PROCEDURE — 99215 OFFICE O/P EST HI 40 MIN: CPT

## 2023-01-01 PROCEDURE — 87506 IADNA-DNA/RNA PROBE TQ 6-11: CPT

## 2023-01-01 PROCEDURE — 2500000004 HC RX 250 GENERAL PHARMACY W/ HCPCS (ALT 636 FOR OP/ED): Performed by: EMERGENCY MEDICINE

## 2023-01-01 PROCEDURE — 96365 THER/PROPH/DIAG IV INF INIT: CPT

## 2023-01-01 PROCEDURE — 3078F DIAST BP <80 MM HG: CPT | Performed by: INTERNAL MEDICINE

## 2023-01-01 PROCEDURE — 2720000007 HC OR 272 NO HCPCS: Performed by: HOSPITALIST

## 2023-01-01 PROCEDURE — 2500000001 HC RX 250 WO HCPCS SELF ADMINISTERED DRUGS (ALT 637 FOR MEDICARE OP): Performed by: INTERNAL MEDICINE

## 2023-01-01 PROCEDURE — 99213 OFFICE O/P EST LOW 20 MIN: CPT | Performed by: STUDENT IN AN ORGANIZED HEALTH CARE EDUCATION/TRAINING PROGRAM

## 2023-01-01 PROCEDURE — 7100000010 HC PHASE TWO TIME - EACH INCREMENTAL 1 MINUTE: Performed by: HOSPITALIST

## 2023-01-01 PROCEDURE — 99231 SBSQ HOSP IP/OBS SF/LOW 25: CPT

## 2023-01-01 PROCEDURE — 99223 1ST HOSP IP/OBS HIGH 75: CPT | Performed by: HOSPITALIST

## 2023-01-01 PROCEDURE — 85027 COMPLETE CBC AUTOMATED: CPT | Performed by: INTERNAL MEDICINE

## 2023-01-01 PROCEDURE — 84100 ASSAY OF PHOSPHORUS: CPT

## 2023-01-01 PROCEDURE — 83036 HEMOGLOBIN GLYCOSYLATED A1C: CPT

## 2023-01-01 PROCEDURE — 3051F HG A1C>EQUAL 7.0%<8.0%: CPT | Performed by: HOSPITALIST

## 2023-01-01 PROCEDURE — 2500000001 HC RX 250 WO HCPCS SELF ADMINISTERED DRUGS (ALT 637 FOR MEDICARE OP): Performed by: NURSE PRACTITIONER

## 2023-01-01 PROCEDURE — 71250 CT THORAX DX C-: CPT

## 2023-01-01 PROCEDURE — 1160F RVW MEDS BY RX/DR IN RCRD: CPT | Performed by: HOSPITALIST

## 2023-01-01 PROCEDURE — 2550000001 HC RX 255 CONTRASTS: Performed by: HOSPITALIST

## 2023-01-01 PROCEDURE — 83540 ASSAY OF IRON: CPT | Performed by: INTERNAL MEDICINE

## 2023-01-01 PROCEDURE — 3044F HG A1C LEVEL LT 7.0%: CPT | Performed by: INTERNAL MEDICINE

## 2023-01-01 PROCEDURE — 86825 HLA X-MATH NON-CYTOTOXIC: CPT

## 2023-01-01 PROCEDURE — 83880 ASSAY OF NATRIURETIC PEPTIDE: CPT | Performed by: EMERGENCY MEDICINE

## 2023-01-01 PROCEDURE — 82746 ASSAY OF FOLIC ACID SERUM: CPT

## 2023-01-01 PROCEDURE — 80197 ASSAY OF TACROLIMUS: CPT | Mod: SAMLAB | Performed by: INTERNAL MEDICINE

## 2023-01-01 PROCEDURE — 3075F SYST BP GE 130 - 139MM HG: CPT | Performed by: INTERNAL MEDICINE

## 2023-01-01 PROCEDURE — 1036F TOBACCO NON-USER: CPT | Performed by: STUDENT IN AN ORGANIZED HEALTH CARE EDUCATION/TRAINING PROGRAM

## 2023-01-01 PROCEDURE — 2500000004 HC RX 250 GENERAL PHARMACY W/ HCPCS (ALT 636 FOR OP/ED)

## 2023-01-01 PROCEDURE — 82805 BLOOD GASES W/O2 SATURATION: CPT | Performed by: EMERGENCY MEDICINE

## 2023-01-01 PROCEDURE — 85025 COMPLETE CBC W/AUTO DIFF WBC: CPT | Performed by: EMERGENCY MEDICINE

## 2023-01-01 PROCEDURE — 96372 THER/PROPH/DIAG INJ SC/IM: CPT

## 2023-01-01 PROCEDURE — 36415 COLL VENOUS BLD VENIPUNCTURE: CPT | Performed by: PHYSICIAN ASSISTANT

## 2023-01-01 PROCEDURE — 3051F HG A1C>EQUAL 7.0%<8.0%: CPT | Performed by: INTERNAL MEDICINE

## 2023-01-01 PROCEDURE — 99205 OFFICE O/P NEW HI 60 MIN: CPT | Performed by: STUDENT IN AN ORGANIZED HEALTH CARE EDUCATION/TRAINING PROGRAM

## 2023-01-01 PROCEDURE — 82565 ASSAY OF CREATININE: CPT

## 2023-01-01 PROCEDURE — 86901 BLOOD TYPING SEROLOGIC RH(D): CPT | Performed by: EMERGENCY MEDICINE

## 2023-01-01 PROCEDURE — 1159F MED LIST DOCD IN RCRD: CPT | Performed by: STUDENT IN AN ORGANIZED HEALTH CARE EDUCATION/TRAINING PROGRAM

## 2023-01-01 PROCEDURE — 99153 MOD SED SAME PHYS/QHP EA: CPT | Performed by: HOSPITALIST

## 2023-01-01 PROCEDURE — 85027 COMPLETE CBC AUTOMATED: CPT

## 2023-01-01 PROCEDURE — 83036 HEMOGLOBIN GLYCOSYLATED A1C: CPT | Performed by: INTERNAL MEDICINE

## 2023-01-01 PROCEDURE — 80076 HEPATIC FUNCTION PANEL: CPT

## 2023-01-01 PROCEDURE — 3051F HG A1C>EQUAL 7.0%<8.0%: CPT | Performed by: STUDENT IN AN ORGANIZED HEALTH CARE EDUCATION/TRAINING PROGRAM

## 2023-01-01 PROCEDURE — 3052F HG A1C>EQUAL 8.0%<EQUAL 9.0%: CPT | Performed by: INTERNAL MEDICINE

## 2023-01-01 PROCEDURE — 80197 ASSAY OF TACROLIMUS: CPT

## 2023-01-01 PROCEDURE — 99232 SBSQ HOSP IP/OBS MODERATE 35: CPT | Performed by: PHYSICIAN ASSISTANT

## 2023-01-01 PROCEDURE — G0008 ADMIN INFLUENZA VIRUS VAC: HCPCS | Performed by: INTERNAL MEDICINE

## 2023-01-01 PROCEDURE — 2500000002 HC RX 250 W HCPCS SELF ADMINISTERED DRUGS (ALT 637 FOR MEDICARE OP, ALT 636 FOR OP/ED)

## 2023-01-01 PROCEDURE — 83540 ASSAY OF IRON: CPT

## 2023-01-01 PROCEDURE — 84484 ASSAY OF TROPONIN QUANT: CPT | Performed by: EMERGENCY MEDICINE

## 2023-01-01 PROCEDURE — 93010 ELECTROCARDIOGRAM REPORT: CPT | Performed by: INTERNAL MEDICINE

## 2023-01-01 PROCEDURE — 86803 HEPATITIS C AB TEST: CPT

## 2023-01-01 PROCEDURE — 86900 BLOOD TYPING SEROLOGIC ABO: CPT

## 2023-01-01 PROCEDURE — 87340 HEPATITIS B SURFACE AG IA: CPT

## 2023-01-01 PROCEDURE — 96372 THER/PROPH/DIAG INJ SC/IM: CPT | Performed by: INTERNAL MEDICINE

## 2023-01-01 PROCEDURE — 86644 CMV ANTIBODY: CPT

## 2023-01-01 PROCEDURE — 84520 ASSAY OF UREA NITROGEN: CPT

## 2023-01-01 PROCEDURE — 84439 ASSAY OF FREE THYROXINE: CPT

## 2023-01-01 PROCEDURE — 83605 ASSAY OF LACTIC ACID: CPT | Performed by: EMERGENCY MEDICINE

## 2023-01-01 PROCEDURE — 89240 UNLISTED MISC PATH TEST: CPT | Performed by: SURGERY

## 2023-01-01 PROCEDURE — 96375 TX/PRO/DX INJ NEW DRUG ADDON: CPT

## 2023-01-01 PROCEDURE — 84443 ASSAY THYROID STIM HORMONE: CPT

## 2023-01-01 PROCEDURE — 86706 HEP B SURFACE ANTIBODY: CPT

## 2023-01-01 PROCEDURE — 86833 HLA CLASS II HIGH DEFIN QUAL: CPT

## 2023-01-01 PROCEDURE — 80048 BASIC METABOLIC PNL TOTAL CA: CPT | Performed by: INTERNAL MEDICINE

## 2023-01-01 PROCEDURE — 4010F ACE/ARB THERAPY RXD/TAKEN: CPT | Performed by: INTERNAL MEDICINE

## 2023-01-01 PROCEDURE — 3008F BODY MASS INDEX DOCD: CPT | Performed by: STUDENT IN AN ORGANIZED HEALTH CARE EDUCATION/TRAINING PROGRAM

## 2023-01-01 PROCEDURE — 80053 COMPREHEN METABOLIC PANEL: CPT | Performed by: EMERGENCY MEDICINE

## 2023-01-01 PROCEDURE — 1111F DSCHRG MED/CURRENT MED MERGE: CPT | Performed by: INTERNAL MEDICINE

## 2023-01-01 PROCEDURE — 93458 L HRT ARTERY/VENTRICLE ANGIO: CPT | Performed by: HOSPITALIST

## 2023-01-01 PROCEDURE — 82570 ASSAY OF URINE CREATININE: CPT

## 2023-01-01 PROCEDURE — 36600 WITHDRAWAL OF ARTERIAL BLOOD: CPT

## 2023-01-01 PROCEDURE — 2500000004 HC RX 250 GENERAL PHARMACY W/ HCPCS (ALT 636 FOR OP/ED): Mod: JZ | Performed by: INTERNAL MEDICINE

## 2023-01-01 PROCEDURE — 1126F AMNT PAIN NOTED NONE PRSNT: CPT | Performed by: STUDENT IN AN ORGANIZED HEALTH CARE EDUCATION/TRAINING PROGRAM

## 2023-01-01 PROCEDURE — 3008F BODY MASS INDEX DOCD: CPT | Performed by: HOSPITALIST

## 2023-01-01 PROCEDURE — 1160F RVW MEDS BY RX/DR IN RCRD: CPT | Performed by: INTERNAL MEDICINE

## 2023-01-01 PROCEDURE — 83735 ASSAY OF MAGNESIUM: CPT

## 2023-01-01 PROCEDURE — 99215 OFFICE O/P EST HI 40 MIN: CPT | Performed by: STUDENT IN AN ORGANIZED HEALTH CARE EDUCATION/TRAINING PROGRAM

## 2023-01-01 PROCEDURE — 93970 EXTREMITY STUDY: CPT | Performed by: STUDENT IN AN ORGANIZED HEALTH CARE EDUCATION/TRAINING PROGRAM

## 2023-01-01 PROCEDURE — 86920 COMPATIBILITY TEST SPIN: CPT

## 2023-01-01 PROCEDURE — 1159F MED LIST DOCD IN RCRD: CPT | Performed by: HOSPITALIST

## 2023-01-01 PROCEDURE — 84550 ASSAY OF BLOOD/URIC ACID: CPT | Performed by: PHYSICIAN ASSISTANT

## 2023-01-01 PROCEDURE — 36415 COLL VENOUS BLD VENIPUNCTURE: CPT | Performed by: EMERGENCY MEDICINE

## 2023-01-01 PROCEDURE — 80053 COMPREHEN METABOLIC PANEL: CPT

## 2023-01-01 PROCEDURE — 71046 X-RAY EXAM CHEST 2 VIEWS: CPT | Mod: FOREIGN READ | Performed by: RADIOLOGY

## 2023-01-01 PROCEDURE — 99205 OFFICE O/P NEW HI 60 MIN: CPT

## 2023-01-01 PROCEDURE — 93005 ELECTROCARDIOGRAM TRACING: CPT

## 2023-01-01 PROCEDURE — 93454 CORONARY ARTERY ANGIO S&I: CPT | Performed by: HOSPITALIST

## 2023-01-01 PROCEDURE — 86704 HEP B CORE ANTIBODY TOTAL: CPT

## 2023-01-01 PROCEDURE — 3077F SYST BP >= 140 MM HG: CPT | Performed by: INTERNAL MEDICINE

## 2023-01-01 PROCEDURE — 87389 HIV-1 AG W/HIV-1&-2 AB AG IA: CPT

## 2023-01-01 PROCEDURE — RXMED WILLOW AMBULATORY MEDICATION CHARGE

## 2023-01-01 PROCEDURE — 99222 1ST HOSP IP/OBS MODERATE 55: CPT | Performed by: INTERNAL MEDICINE

## 2023-01-01 PROCEDURE — 93970 EXTREMITY STUDY: CPT

## 2023-01-01 PROCEDURE — 94664 DEMO&/EVAL PT USE INHALER: CPT

## 2023-01-01 PROCEDURE — C1769 GUIDE WIRE: HCPCS | Performed by: HOSPITALIST

## 2023-01-01 PROCEDURE — 86832 HLA CLASS I HIGH DEFIN QUAL: CPT

## 2023-01-01 PROCEDURE — 3066F NEPHROPATHY DOC TX: CPT | Performed by: STUDENT IN AN ORGANIZED HEALTH CARE EDUCATION/TRAINING PROGRAM

## 2023-01-01 PROCEDURE — 86665 EPSTEIN-BARR CAPSID VCA: CPT

## 2023-01-01 PROCEDURE — 9420000001 HC RT PATIENT EDUCATION 5 MIN

## 2023-01-01 PROCEDURE — 87799 DETECT AGENT NOS DNA QUANT: CPT

## 2023-01-01 PROCEDURE — 83735 ASSAY OF MAGNESIUM: CPT | Performed by: PHYSICIAN ASSISTANT

## 2023-01-01 PROCEDURE — 83970 ASSAY OF PARATHORMONE: CPT

## 2023-01-01 PROCEDURE — 93922 UPR/L XTREMITY ART 2 LEVELS: CPT | Performed by: STUDENT IN AN ORGANIZED HEALTH CARE EDUCATION/TRAINING PROGRAM

## 2023-01-01 PROCEDURE — 30233N1 TRANSFUSION OF NONAUTOLOGOUS RED BLOOD CELLS INTO PERIPHERAL VEIN, PERCUTANEOUS APPROACH: ICD-10-PCS | Performed by: HOSPITALIST

## 2023-01-01 PROCEDURE — 94640 AIRWAY INHALATION TREATMENT: CPT

## 2023-01-01 PROCEDURE — 90686 IIV4 VACC NO PRSV 0.5 ML IM: CPT | Performed by: INTERNAL MEDICINE

## 2023-01-01 PROCEDURE — 76937 US GUIDE VASCULAR ACCESS: CPT | Performed by: HOSPITALIST

## 2023-01-01 PROCEDURE — 71250 CT THORAX DX C-: CPT | Performed by: RADIOLOGY

## 2023-01-01 PROCEDURE — 84075 ASSAY ALKALINE PHOSPHATASE: CPT | Performed by: INTERNAL MEDICINE

## 2023-01-01 PROCEDURE — 84100 ASSAY OF PHOSPHORUS: CPT | Performed by: EMERGENCY MEDICINE

## 2023-01-01 PROCEDURE — 99212 OFFICE O/P EST SF 10 MIN: CPT

## 2023-01-01 PROCEDURE — 7100000009 HC PHASE TWO TIME - INITIAL BASE CHARGE: Performed by: HOSPITALIST

## 2023-01-01 PROCEDURE — 82607 VITAMIN B-12: CPT

## 2023-01-01 PROCEDURE — 82150 ASSAY OF AMYLASE: CPT

## 2023-01-01 PROCEDURE — 85730 THROMBOPLASTIN TIME PARTIAL: CPT | Performed by: EMERGENCY MEDICINE

## 2023-01-01 PROCEDURE — 82306 VITAMIN D 25 HYDROXY: CPT | Performed by: PHYSICIAN ASSISTANT

## 2023-01-01 PROCEDURE — 36430 TRANSFUSION BLD/BLD COMPNT: CPT

## 2023-01-01 PROCEDURE — G0103 PSA SCREENING: HCPCS

## 2023-01-01 PROCEDURE — 80323 ALKALOIDS NOS: CPT

## 2023-01-01 PROCEDURE — 99212 OFFICE O/P EST SF 10 MIN: CPT | Mod: 95

## 2023-01-01 PROCEDURE — 82728 ASSAY OF FERRITIN: CPT | Performed by: INTERNAL MEDICINE

## 2023-01-01 PROCEDURE — 2500000005 HC RX 250 GENERAL PHARMACY W/O HCPCS: Performed by: HOSPITALIST

## 2023-01-01 PROCEDURE — 85379 FIBRIN DEGRADATION QUANT: CPT

## 2023-01-01 PROCEDURE — 99285 EMERGENCY DEPT VISIT HI MDM: CPT | Performed by: EMERGENCY MEDICINE

## 2023-01-01 PROCEDURE — 84156 ASSAY OF PROTEIN URINE: CPT

## 2023-01-01 PROCEDURE — 84681 ASSAY OF C-PEPTIDE: CPT

## 2023-01-01 PROCEDURE — 86481 TB AG RESPONSE T-CELL SUSP: CPT

## 2023-01-01 PROCEDURE — 87493 C DIFF AMPLIFIED PROBE: CPT

## 2023-01-01 PROCEDURE — 86901 BLOOD TYPING SEROLOGIC RH(D): CPT

## 2023-01-01 PROCEDURE — 3066F NEPHROPATHY DOC TX: CPT | Performed by: HOSPITALIST

## 2023-01-01 PROCEDURE — 71046 X-RAY EXAM CHEST 2 VIEWS: CPT | Mod: FY,FR

## 2023-01-01 PROCEDURE — 85610 PROTHROMBIN TIME: CPT | Performed by: EMERGENCY MEDICINE

## 2023-01-01 PROCEDURE — 96366 THER/PROPH/DIAG IV INF ADDON: CPT

## 2023-01-01 PROCEDURE — 93005 ELECTROCARDIOGRAM TRACING: CPT | Performed by: STUDENT IN AN ORGANIZED HEALTH CARE EDUCATION/TRAINING PROGRAM

## 2023-01-01 PROCEDURE — 83550 IRON BINDING TEST: CPT

## 2023-01-01 PROCEDURE — P9016 RBC LEUKOCYTES REDUCED: HCPCS

## 2023-01-01 RX ORDER — LANOLIN ALCOHOL/MO/W.PET/CERES
400 CREAM (GRAM) TOPICAL 3 TIMES DAILY
Qty: 90 TABLET | Refills: 0 | Status: SHIPPED | OUTPATIENT
Start: 2023-01-01 | End: 2023-01-01 | Stop reason: HOSPADM

## 2023-01-01 RX ORDER — MULTIVIT-MIN/IRON FUM/FOLIC AC 7.5 MG-4
1 TABLET ORAL DAILY
Status: DISCONTINUED | OUTPATIENT
Start: 2023-01-01 | End: 2023-01-01 | Stop reason: HOSPADM

## 2023-01-01 RX ORDER — ACETAMINOPHEN 325 MG/1
650 TABLET ORAL EVERY 4 HOURS PRN
Status: DISCONTINUED | OUTPATIENT
Start: 2023-01-01 | End: 2023-01-01 | Stop reason: HOSPADM

## 2023-01-01 RX ORDER — BLOOD SUGAR DIAGNOSTIC
STRIP MISCELLANEOUS
Qty: 300 STRIP | Refills: 2 | Status: SHIPPED | OUTPATIENT
Start: 2023-01-01 | End: 2024-01-01

## 2023-01-01 RX ORDER — MOMETASONE FUROATE 50 UG/1
1 SPRAY, METERED NASAL 2 TIMES DAILY
Status: ON HOLD | COMMUNITY
Start: 2022-11-15 | End: 2023-01-01 | Stop reason: ALTCHOICE

## 2023-01-01 RX ORDER — DIPHENHYDRAMINE HYDROCHLORIDE 50 MG/ML
50 INJECTION INTRAMUSCULAR; INTRAVENOUS AS NEEDED
Status: CANCELLED | OUTPATIENT
Start: 2024-01-01

## 2023-01-01 RX ORDER — SODIUM CHLORIDE 9 MG/ML
100 INJECTION, SOLUTION INTRAVENOUS CONTINUOUS
Status: DISCONTINUED | OUTPATIENT
Start: 2023-01-01 | End: 2023-01-01 | Stop reason: HOSPADM

## 2023-01-01 RX ORDER — ALBUTEROL SULFATE 0.83 MG/ML
3 SOLUTION RESPIRATORY (INHALATION) AS NEEDED
Status: CANCELLED | OUTPATIENT
Start: 2024-01-01

## 2023-01-01 RX ORDER — CALCIUM CARBONATE 500(1250)
1250 TABLET ORAL DAILY
Status: DISCONTINUED | OUTPATIENT
Start: 2023-01-01 | End: 2023-01-01

## 2023-01-01 RX ORDER — LEVOCETIRIZINE DIHYDROCHLORIDE 5 MG/1
1 TABLET, FILM COATED ORAL NIGHTLY PRN
COMMUNITY
Start: 2022-11-15

## 2023-01-01 RX ORDER — TAMSULOSIN HYDROCHLORIDE 0.4 MG/1
0.4 CAPSULE ORAL DAILY
Status: DISCONTINUED | OUTPATIENT
Start: 2023-01-01 | End: 2023-01-01 | Stop reason: HOSPADM

## 2023-01-01 RX ORDER — ALPRAZOLAM 0.5 MG/1
0.5 TABLET ORAL NIGHTLY PRN
Qty: 15 TABLET | Refills: 0 | Status: SHIPPED | OUTPATIENT
Start: 2023-01-01 | End: 2024-03-09

## 2023-01-01 RX ORDER — TACROLIMUS 1 MG/1
1 CAPSULE ORAL EVERY 12 HOURS
COMMUNITY
End: 2024-01-01

## 2023-01-01 RX ORDER — ATORVASTATIN CALCIUM 40 MG/1
40 TABLET, FILM COATED ORAL NIGHTLY
Status: DISCONTINUED | OUTPATIENT
Start: 2023-01-01 | End: 2023-01-01 | Stop reason: HOSPADM

## 2023-01-01 RX ORDER — ONDANSETRON 4 MG/1
4 TABLET, ORALLY DISINTEGRATING ORAL EVERY 8 HOURS PRN
Status: DISCONTINUED | OUTPATIENT
Start: 2023-01-01 | End: 2023-01-01 | Stop reason: HOSPADM

## 2023-01-01 RX ORDER — EPINEPHRINE 0.3 MG/.3ML
0.3 INJECTION SUBCUTANEOUS EVERY 5 MIN PRN
Status: CANCELLED | OUTPATIENT
Start: 2023-01-01

## 2023-01-01 RX ORDER — ACETAMINOPHEN 160 MG
10 TABLET,CHEWABLE ORAL DAILY
Status: DISCONTINUED | OUTPATIENT
Start: 2023-01-01 | End: 2023-01-01

## 2023-01-01 RX ORDER — MULTIVITAMIN
1 TABLET ORAL DAILY
COMMUNITY
Start: 2019-06-26

## 2023-01-01 RX ORDER — LORATADINE 10 MG/1
10 TABLET ORAL DAILY
Status: DISCONTINUED | OUTPATIENT
Start: 2023-01-01 | End: 2023-01-01 | Stop reason: HOSPADM

## 2023-01-01 RX ORDER — FUROSEMIDE 10 MG/ML
20 INJECTION INTRAMUSCULAR; INTRAVENOUS ONCE
Status: COMPLETED | OUTPATIENT
Start: 2023-01-01 | End: 2023-01-01

## 2023-01-01 RX ORDER — BUMETANIDE 1 MG/1
1 TABLET ORAL DAILY
Status: DISCONTINUED | OUTPATIENT
Start: 2023-01-01 | End: 2023-01-01

## 2023-01-01 RX ORDER — ALBUTEROL SULFATE 0.83 MG/ML
3 SOLUTION RESPIRATORY (INHALATION) AS NEEDED
Status: DISCONTINUED | OUTPATIENT
Start: 2023-01-01 | End: 2023-01-01 | Stop reason: HOSPADM

## 2023-01-01 RX ORDER — DEXTROSE MONOHYDRATE 100 MG/ML
0.3 INJECTION, SOLUTION INTRAVENOUS ONCE AS NEEDED
Status: DISCONTINUED | OUTPATIENT
Start: 2023-01-01 | End: 2023-01-01 | Stop reason: HOSPADM

## 2023-01-01 RX ORDER — ALBUTEROL SULFATE 0.83 MG/ML
3 SOLUTION RESPIRATORY (INHALATION) AS NEEDED
Status: CANCELLED | OUTPATIENT
Start: 2023-01-01

## 2023-01-01 RX ORDER — NAPROXEN SODIUM 220 MG/1
81 TABLET, FILM COATED ORAL DAILY
COMMUNITY

## 2023-01-01 RX ORDER — DIPHENHYDRAMINE HYDROCHLORIDE 50 MG/ML
50 INJECTION INTRAMUSCULAR; INTRAVENOUS AS NEEDED
Status: DISCONTINUED | OUTPATIENT
Start: 2023-01-01 | End: 2023-01-01 | Stop reason: HOSPADM

## 2023-01-01 RX ORDER — FAMOTIDINE 10 MG/ML
20 INJECTION INTRAVENOUS ONCE AS NEEDED
Status: DISCONTINUED | OUTPATIENT
Start: 2023-01-01 | End: 2023-01-01 | Stop reason: HOSPADM

## 2023-01-01 RX ORDER — POLYETHYLENE GLYCOL 3350 17 G/17G
17 POWDER, FOR SOLUTION ORAL DAILY
Status: DISCONTINUED | OUTPATIENT
Start: 2023-01-01 | End: 2023-01-01 | Stop reason: HOSPADM

## 2023-01-01 RX ORDER — INSULIN LISPRO 100 [IU]/ML
0-5 INJECTION, SOLUTION INTRAVENOUS; SUBCUTANEOUS
Status: DISCONTINUED | OUTPATIENT
Start: 2023-01-01 | End: 2023-01-01 | Stop reason: HOSPADM

## 2023-01-01 RX ORDER — FAMOTIDINE 10 MG/ML
20 INJECTION INTRAVENOUS ONCE AS NEEDED
Status: CANCELLED | OUTPATIENT
Start: 2023-01-01

## 2023-01-01 RX ORDER — INSULIN GLARGINE 100 [IU]/ML
76 INJECTION, SOLUTION SUBCUTANEOUS EVERY 24 HOURS
Status: DISCONTINUED | OUTPATIENT
Start: 2023-01-01 | End: 2023-01-01 | Stop reason: HOSPADM

## 2023-01-01 RX ORDER — APIXABAN 5 MG/1
1 TABLET, FILM COATED ORAL 2 TIMES DAILY
Status: ON HOLD | COMMUNITY
Start: 2020-05-13 | End: 2023-01-01 | Stop reason: SDUPTHER

## 2023-01-01 RX ORDER — ACETAMINOPHEN 650 MG/1
650 SUPPOSITORY RECTAL EVERY 4 HOURS PRN
Status: DISCONTINUED | OUTPATIENT
Start: 2023-01-01 | End: 2023-01-01 | Stop reason: HOSPADM

## 2023-01-01 RX ORDER — BUMETANIDE 2 MG/1
2 TABLET ORAL EVERY 12 HOURS SCHEDULED
Qty: 60 TABLET | Refills: 0 | Status: SHIPPED | OUTPATIENT
Start: 2023-01-01 | End: 2023-01-01

## 2023-01-01 RX ORDER — DEXTROSE 50 % IN WATER (D50W) INTRAVENOUS SYRINGE
25
Status: DISCONTINUED | OUTPATIENT
Start: 2023-01-01 | End: 2023-01-01 | Stop reason: HOSPADM

## 2023-01-01 RX ORDER — ACETAMINOPHEN 160 MG/5ML
650 SOLUTION ORAL EVERY 4 HOURS PRN
Status: DISCONTINUED | OUTPATIENT
Start: 2023-01-01 | End: 2023-01-01 | Stop reason: HOSPADM

## 2023-01-01 RX ORDER — ACETAMINOPHEN 160 MG/5ML
650 SOLUTION ORAL EVERY 6 HOURS PRN
Status: DISCONTINUED | OUTPATIENT
Start: 2023-01-01 | End: 2023-01-01 | Stop reason: HOSPADM

## 2023-01-01 RX ORDER — ALBUTEROL SULFATE 90 UG/1
2 AEROSOL, METERED RESPIRATORY (INHALATION) EVERY 6 HOURS PRN
COMMUNITY
Start: 2021-04-14

## 2023-01-01 RX ORDER — BUMETANIDE 1 MG/1
2 TABLET ORAL EVERY 12 HOURS SCHEDULED
Status: DISCONTINUED | OUTPATIENT
Start: 2023-01-01 | End: 2023-01-01 | Stop reason: HOSPADM

## 2023-01-01 RX ORDER — FAMOTIDINE 20 MG/1
20 TABLET, FILM COATED ORAL ONCE
Status: COMPLETED | OUTPATIENT
Start: 2023-01-01 | End: 2023-01-01

## 2023-01-01 RX ORDER — MYCOPHENOLATE MOFETIL 250 MG/1
500 CAPSULE ORAL 2 TIMES DAILY
Qty: 120 CAPSULE | Refills: 11 | Status: SHIPPED | OUTPATIENT
Start: 2023-01-01 | End: 2024-03-09

## 2023-01-01 RX ORDER — HEPARIN SODIUM 1000 [USP'U]/ML
INJECTION, SOLUTION INTRAVENOUS; SUBCUTANEOUS AS NEEDED
Status: DISCONTINUED | OUTPATIENT
Start: 2023-01-01 | End: 2023-01-01 | Stop reason: HOSPADM

## 2023-01-01 RX ORDER — LEVOTHYROXINE SODIUM 150 UG/1
150 TABLET ORAL DAILY
Qty: 90 TABLET | Refills: 1 | Status: SHIPPED | OUTPATIENT
Start: 2023-01-01 | End: 2024-01-01 | Stop reason: ALTCHOICE

## 2023-01-01 RX ORDER — DIPHENHYDRAMINE HCL 25 MG
50 CAPSULE ORAL ONCE
Status: COMPLETED | OUTPATIENT
Start: 2023-01-01 | End: 2023-01-01

## 2023-01-01 RX ORDER — LANOLIN ALCOHOL/MO/W.PET/CERES
1000 CREAM (GRAM) TOPICAL DAILY
Status: DISCONTINUED | OUTPATIENT
Start: 2023-01-01 | End: 2023-01-01 | Stop reason: HOSPADM

## 2023-01-01 RX ORDER — INSULIN DEGLUDEC 100 U/ML
84 INJECTION, SOLUTION SUBCUTANEOUS NIGHTLY
Qty: 10 ML | Refills: 3 | Status: SHIPPED | OUTPATIENT
Start: 2023-01-01 | End: 2023-01-01 | Stop reason: WASHOUT

## 2023-01-01 RX ORDER — TAMSULOSIN HYDROCHLORIDE 0.4 MG/1
CAPSULE ORAL
Qty: 90 CAPSULE | Refills: 3 | Status: SHIPPED | OUTPATIENT
Start: 2023-01-01 | End: 2024-01-01

## 2023-01-01 RX ORDER — MAGNESIUM GLUCONATE 27.5 (500)
1 TABLET ORAL NIGHTLY
COMMUNITY
End: 2023-01-01 | Stop reason: HOSPADM

## 2023-01-01 RX ORDER — LANOLIN ALCOHOL/MO/W.PET/CERES
400 CREAM (GRAM) TOPICAL 2 TIMES DAILY
Start: 2023-01-01 | End: 2024-03-09

## 2023-01-01 RX ORDER — ACETAMINOPHEN 650 MG/1
650 SUPPOSITORY RECTAL EVERY 6 HOURS PRN
Status: DISCONTINUED | OUTPATIENT
Start: 2023-01-01 | End: 2023-01-01 | Stop reason: HOSPADM

## 2023-01-01 RX ORDER — APIXABAN 5 MG/1
5 TABLET, FILM COATED ORAL 2 TIMES DAILY
COMMUNITY
Start: 2023-01-01 | End: 2023-01-01 | Stop reason: ALTCHOICE

## 2023-01-01 RX ORDER — DIPHENHYDRAMINE HYDROCHLORIDE 50 MG/ML
50 INJECTION INTRAMUSCULAR; INTRAVENOUS AS NEEDED
Status: CANCELLED | OUTPATIENT
Start: 2023-01-01

## 2023-01-01 RX ORDER — SODIUM BICARBONATE 650 MG/1
2 TABLET ORAL 2 TIMES DAILY
COMMUNITY

## 2023-01-01 RX ORDER — MAGNESIUM GLUCONATE 27.5 (500)
27.5 TABLET ORAL NIGHTLY
Status: DISCONTINUED | OUTPATIENT
Start: 2023-01-01 | End: 2023-01-01

## 2023-01-01 RX ORDER — INSULIN LISPRO 100 [IU]/ML
INJECTION, SOLUTION INTRAVENOUS; SUBCUTANEOUS
COMMUNITY
Start: 2019-07-24 | End: 2023-01-01

## 2023-01-01 RX ORDER — INSULIN LISPRO 100 [IU]/ML
INJECTION, SOLUTION INTRAVENOUS; SUBCUTANEOUS
Status: DISPENSED
Start: 2023-01-01 | End: 2023-01-01

## 2023-01-01 RX ORDER — APIXABAN 2.5 MG/1
5 TABLET, FILM COATED ORAL 2 TIMES DAILY
COMMUNITY
Start: 2023-01-01

## 2023-01-01 RX ORDER — MAGNESIUM SULFATE HEPTAHYDRATE 40 MG/ML
2 INJECTION, SOLUTION INTRAVENOUS ONCE
Status: COMPLETED | OUTPATIENT
Start: 2023-01-01 | End: 2023-01-01

## 2023-01-01 RX ORDER — ALPRAZOLAM 0.5 MG/1
0.5 TABLET ORAL NIGHTLY PRN
COMMUNITY
Start: 2021-06-10 | End: 2023-01-01 | Stop reason: SDUPTHER

## 2023-01-01 RX ORDER — ACETAMINOPHEN 325 MG/1
650 TABLET ORAL EVERY 6 HOURS PRN
Status: DISCONTINUED | OUTPATIENT
Start: 2023-01-01 | End: 2023-01-01 | Stop reason: HOSPADM

## 2023-01-01 RX ORDER — BUDESONIDE, GLYCOPYRROLATE, AND FORMOTEROL FUMARATE 160; 9; 4.8 UG/1; UG/1; UG/1
AEROSOL, METERED RESPIRATORY (INHALATION)
COMMUNITY
Start: 2023-01-01 | End: 2023-01-01 | Stop reason: ALTCHOICE

## 2023-01-01 RX ORDER — ALBUTEROL SULFATE 90 UG/1
2 AEROSOL, METERED RESPIRATORY (INHALATION) EVERY 6 HOURS PRN
Status: DISCONTINUED | OUTPATIENT
Start: 2023-01-01 | End: 2023-01-01

## 2023-01-01 RX ORDER — CALCIUM CARBONATE 500(1250)
1 TABLET ORAL DAILY
COMMUNITY
Start: 2019-06-26

## 2023-01-01 RX ORDER — TACROLIMUS 1 MG/1
1 CAPSULE ORAL EVERY 12 HOURS
Status: DISCONTINUED | OUTPATIENT
Start: 2023-01-01 | End: 2023-01-01 | Stop reason: HOSPADM

## 2023-01-01 RX ORDER — INSULIN LISPRO 100 [IU]/ML
INJECTION, SOLUTION INTRAVENOUS; SUBCUTANEOUS
Qty: 60 ML | Refills: 3 | Status: SHIPPED | OUTPATIENT
Start: 2023-01-01 | End: 2024-03-09

## 2023-01-01 RX ORDER — FERROUS SULFATE, DRIED 160(50) MG
1 TABLET, EXTENDED RELEASE ORAL DAILY
Status: DISCONTINUED | OUTPATIENT
Start: 2023-01-01 | End: 2023-01-01 | Stop reason: HOSPADM

## 2023-01-01 RX ORDER — PREDNISONE 5 MG/1
1 TABLET ORAL DAILY
COMMUNITY
Start: 2021-06-24 | End: 2024-01-01

## 2023-01-01 RX ORDER — LIDOCAINE HYDROCHLORIDE 20 MG/ML
INJECTION, SOLUTION INFILTRATION; PERINEURAL AS NEEDED
Status: DISCONTINUED | OUTPATIENT
Start: 2023-01-01 | End: 2023-01-01 | Stop reason: HOSPADM

## 2023-01-01 RX ORDER — INSULIN DEGLUDEC 100 U/ML
80 INJECTION, SOLUTION SUBCUTANEOUS NIGHTLY
Qty: 10 ML | Refills: 3 | Status: SHIPPED | OUTPATIENT
Start: 2023-01-01 | End: 2023-01-01 | Stop reason: SDUPTHER

## 2023-01-01 RX ORDER — EPINEPHRINE 0.3 MG/.3ML
0.3 INJECTION SUBCUTANEOUS EVERY 5 MIN PRN
Status: DISCONTINUED | OUTPATIENT
Start: 2023-01-01 | End: 2023-01-01 | Stop reason: HOSPADM

## 2023-01-01 RX ORDER — NIFEDIPINE 30 MG/1
30 TABLET, FILM COATED, EXTENDED RELEASE ORAL DAILY
Status: DISCONTINUED | OUTPATIENT
Start: 2023-01-01 | End: 2023-01-01 | Stop reason: HOSPADM

## 2023-01-01 RX ORDER — PREDNISONE 50 MG/1
50 TABLET ORAL EVERY 6 HOURS
Qty: 2 TABLET | Refills: 0 | Status: SHIPPED | OUTPATIENT
Start: 2023-01-01 | End: 2023-01-01 | Stop reason: HOSPADM

## 2023-01-01 RX ORDER — PREDNISONE 5 MG/1
5 TABLET ORAL DAILY
Status: DISCONTINUED | OUTPATIENT
Start: 2023-01-01 | End: 2023-01-01 | Stop reason: HOSPADM

## 2023-01-01 RX ORDER — MIDAZOLAM HYDROCHLORIDE 1 MG/ML
INJECTION INTRAMUSCULAR; INTRAVENOUS AS NEEDED
Status: DISCONTINUED | OUTPATIENT
Start: 2023-01-01 | End: 2023-01-01 | Stop reason: HOSPADM

## 2023-01-01 RX ORDER — LANOLIN ALCOHOL/MO/W.PET/CERES
400 CREAM (GRAM) TOPICAL 2 TIMES DAILY
Status: DISCONTINUED | OUTPATIENT
Start: 2023-01-01 | End: 2023-01-01 | Stop reason: HOSPADM

## 2023-01-01 RX ORDER — FAMOTIDINE 10 MG/ML
20 INJECTION INTRAVENOUS ONCE AS NEEDED
Status: CANCELLED | OUTPATIENT
Start: 2024-01-01

## 2023-01-01 RX ORDER — NIFEDIPINE 30 MG/1
1 TABLET, FILM COATED, EXTENDED RELEASE ORAL DAILY
COMMUNITY
Start: 2023-01-01 | End: 2024-01-01

## 2023-01-01 RX ORDER — INSULIN LISPRO 100 [IU]/ML
0-15 INJECTION, SOLUTION INTRAVENOUS; SUBCUTANEOUS EVERY 4 HOURS
Status: DISCONTINUED | OUTPATIENT
Start: 2023-01-01 | End: 2023-01-01

## 2023-01-01 RX ORDER — LANOLIN ALCOHOL/MO/W.PET/CERES
1000 CREAM (GRAM) TOPICAL DAILY
COMMUNITY

## 2023-01-01 RX ORDER — INSULIN DEGLUDEC 200 U/ML
84 INJECTION, SOLUTION SUBCUTANEOUS NIGHTLY
Qty: 10 ML | Refills: 12 | Status: SHIPPED | OUTPATIENT
Start: 2023-01-01 | End: 2024-03-09

## 2023-01-01 RX ORDER — DARBEPOETIN ALFA 60 UG/ML
60 SOLUTION INTRAVENOUS; SUBCUTANEOUS
Qty: 1 ML | Refills: 0 | Status: SHIPPED | OUTPATIENT
Start: 2023-01-01 | End: 2023-01-01 | Stop reason: HOSPADM

## 2023-01-01 RX ORDER — LOSARTAN POTASSIUM 100 MG/1
1 TABLET ORAL DAILY
COMMUNITY
Start: 2022-01-19 | End: 2023-01-01 | Stop reason: SINTOL

## 2023-01-01 RX ORDER — LANOLIN ALCOHOL/MO/W.PET/CERES
400 CREAM (GRAM) TOPICAL NIGHTLY
Status: DISCONTINUED | OUTPATIENT
Start: 2023-01-01 | End: 2023-01-01

## 2023-01-01 RX ORDER — FLUTICASONE PROPIONATE 50 MCG
2 SPRAY, SUSPENSION (ML) NASAL DAILY
Status: DISCONTINUED | OUTPATIENT
Start: 2023-01-01 | End: 2023-01-01 | Stop reason: HOSPADM

## 2023-01-01 RX ORDER — CARVEDILOL 12.5 MG/1
TABLET ORAL
Qty: 180 TABLET | Refills: 3 | Status: SHIPPED | OUTPATIENT
Start: 2023-01-01 | End: 2024-01-01

## 2023-01-01 RX ORDER — BUMETANIDE 0.5 MG/1
1 TABLET ORAL DAILY
Qty: 180 TABLET | Refills: 3 | Status: SHIPPED | OUTPATIENT
Start: 2023-01-01 | End: 2023-01-01 | Stop reason: HOSPADM

## 2023-01-01 RX ORDER — ALBUTEROL SULFATE 0.83 MG/ML
2.5 SOLUTION RESPIRATORY (INHALATION) EVERY 6 HOURS PRN
Status: DISCONTINUED | OUTPATIENT
Start: 2023-01-01 | End: 2023-01-01 | Stop reason: HOSPADM

## 2023-01-01 RX ORDER — TACROLIMUS 0.5 MG/1
CAPSULE ORAL
COMMUNITY
Start: 2021-07-07 | End: 2023-01-01 | Stop reason: ALTCHOICE

## 2023-01-01 RX ORDER — LANOLIN ALCOHOL/MO/W.PET/CERES
400 CREAM (GRAM) TOPICAL 3 TIMES DAILY
Status: DISCONTINUED | OUTPATIENT
Start: 2023-01-01 | End: 2023-01-01

## 2023-01-01 RX ORDER — OMEPRAZOLE 20 MG/1
1 CAPSULE, DELAYED RELEASE ORAL DAILY
COMMUNITY
Start: 2019-06-26 | End: 2024-01-01

## 2023-01-01 RX ORDER — LEVOTHYROXINE SODIUM 137 UG/1
1 TABLET ORAL DAILY
COMMUNITY
Start: 2019-06-26 | End: 2023-01-01 | Stop reason: SINTOL

## 2023-01-01 RX ORDER — BUDESONIDE, GLYCOPYRROLATE, AND FORMOTEROL FUMARATE 160; 9; 4.8 UG/1; UG/1; UG/1
2 AEROSOL, METERED RESPIRATORY (INHALATION) 2 TIMES DAILY
COMMUNITY
Start: 2023-01-01

## 2023-01-01 RX ORDER — INSULIN DEGLUDEC 100 U/ML
80 INJECTION, SOLUTION SUBCUTANEOUS NIGHTLY
Qty: 24 ML | Refills: 11 | Status: SHIPPED | OUTPATIENT
Start: 2023-01-01 | End: 2023-01-01 | Stop reason: ALTCHOICE

## 2023-01-01 RX ORDER — PANTOPRAZOLE SODIUM 40 MG/1
40 TABLET, DELAYED RELEASE ORAL
Status: DISCONTINUED | OUTPATIENT
Start: 2023-01-01 | End: 2023-01-01 | Stop reason: HOSPADM

## 2023-01-01 RX ORDER — ALLOPURINOL 100 MG/1
1 TABLET ORAL DAILY
COMMUNITY
Start: 2022-10-19 | End: 2023-01-01 | Stop reason: ENTERED-IN-ERROR

## 2023-01-01 RX ORDER — LEVOTHYROXINE SODIUM 150 UG/1
150 TABLET ORAL DAILY
Status: DISCONTINUED | OUTPATIENT
Start: 2023-01-01 | End: 2023-01-01 | Stop reason: HOSPADM

## 2023-01-01 RX ORDER — DULAGLUTIDE 0.75 MG/.5ML
INJECTION, SOLUTION SUBCUTANEOUS
COMMUNITY
Start: 2022-12-12 | End: 2023-01-01

## 2023-01-01 RX ORDER — CHOLECALCIFEROL (VITAMIN D3) 25 MCG
5000 TABLET ORAL DAILY
Status: DISCONTINUED | OUTPATIENT
Start: 2023-01-01 | End: 2023-01-01 | Stop reason: HOSPADM

## 2023-01-01 RX ORDER — ALPRAZOLAM 0.5 MG/1
0.5 TABLET ORAL NIGHTLY PRN
Status: DISCONTINUED | OUTPATIENT
Start: 2023-01-01 | End: 2023-01-01 | Stop reason: HOSPADM

## 2023-01-01 RX ORDER — EPINEPHRINE 0.3 MG/.3ML
0.3 INJECTION SUBCUTANEOUS EVERY 5 MIN PRN
Status: CANCELLED | OUTPATIENT
Start: 2024-01-01

## 2023-01-01 RX ORDER — FENTANYL CITRATE 50 UG/ML
INJECTION, SOLUTION INTRAMUSCULAR; INTRAVENOUS AS NEEDED
Status: DISCONTINUED | OUTPATIENT
Start: 2023-01-01 | End: 2023-01-01 | Stop reason: HOSPADM

## 2023-01-01 RX ORDER — INSULIN DEGLUDEC 200 U/ML
84 INJECTION, SOLUTION SUBCUTANEOUS NIGHTLY
Status: DISCONTINUED | OUTPATIENT
Start: 2023-01-01 | End: 2023-01-01

## 2023-01-01 RX ORDER — MYCOPHENOLATE MOFETIL 250 MG/1
CAPSULE ORAL
COMMUNITY
Start: 2019-06-26 | End: 2023-01-01 | Stop reason: SDUPTHER

## 2023-01-01 RX ORDER — CARVEDILOL 12.5 MG/1
12.5 TABLET ORAL 2 TIMES DAILY
Status: DISCONTINUED | OUTPATIENT
Start: 2023-01-01 | End: 2023-01-01 | Stop reason: HOSPADM

## 2023-01-01 RX ORDER — ONDANSETRON HYDROCHLORIDE 2 MG/ML
4 INJECTION, SOLUTION INTRAVENOUS EVERY 8 HOURS PRN
Status: DISCONTINUED | OUTPATIENT
Start: 2023-01-01 | End: 2023-01-01 | Stop reason: HOSPADM

## 2023-01-01 RX ORDER — NAPROXEN SODIUM 220 MG/1
81 TABLET, FILM COATED ORAL DAILY
Status: DISCONTINUED | OUTPATIENT
Start: 2023-01-01 | End: 2023-01-01 | Stop reason: HOSPADM

## 2023-01-01 RX ORDER — ATORVASTATIN CALCIUM 40 MG/1
1 TABLET, FILM COATED ORAL NIGHTLY
COMMUNITY
Start: 2020-09-16 | End: 2024-01-01

## 2023-01-01 RX ORDER — MYCOPHENOLATE MOFETIL 250 MG/1
500 CAPSULE ORAL 2 TIMES DAILY
Status: DISCONTINUED | OUTPATIENT
Start: 2023-01-01 | End: 2023-01-01 | Stop reason: HOSPADM

## 2023-01-01 RX ORDER — SODIUM BICARBONATE 650 MG/1
1300 TABLET ORAL 2 TIMES DAILY
Status: DISCONTINUED | OUTPATIENT
Start: 2023-01-01 | End: 2023-01-01 | Stop reason: HOSPADM

## 2023-01-01 RX ORDER — BLOOD SUGAR DIAGNOSTIC
STRIP MISCELLANEOUS
COMMUNITY
Start: 2019-11-21 | End: 2023-01-01

## 2023-01-01 RX ORDER — ACETAMINOPHEN 500 MG
5000 TABLET ORAL DAILY
Qty: 30 TABLET | Refills: 0 | Status: SHIPPED | OUTPATIENT
Start: 2023-01-01 | End: 2024-01-01

## 2023-01-01 RX ADMIN — APIXABAN 5 MG: 5 TABLET, FILM COATED ORAL at 21:02

## 2023-01-01 RX ADMIN — PANTOPRAZOLE SODIUM 40 MG: 40 TABLET, DELAYED RELEASE ORAL at 06:19

## 2023-01-01 RX ADMIN — Medication 400 MG: at 21:02

## 2023-01-01 RX ADMIN — CARVEDILOL 12.5 MG: 12.5 TABLET, FILM COATED ORAL at 08:50

## 2023-01-01 RX ADMIN — FUROSEMIDE 10 MG/HR: 10 INJECTION, SOLUTION INTRAMUSCULAR; INTRAVENOUS at 13:14

## 2023-01-01 RX ADMIN — FAMOTIDINE 20 MG: 20 TABLET, FILM COATED ORAL at 12:29

## 2023-01-01 RX ADMIN — TACROLIMUS 1 MG: 1 CAPSULE ORAL at 08:52

## 2023-01-01 RX ADMIN — PREDNISONE 5 MG: 5 TABLET ORAL at 08:41

## 2023-01-01 RX ADMIN — TAMSULOSIN HYDROCHLORIDE 0.4 MG: 0.4 CAPSULE ORAL at 08:42

## 2023-01-01 RX ADMIN — CARVEDILOL 12.5 MG: 12.5 TABLET, FILM COATED ORAL at 21:02

## 2023-01-01 RX ADMIN — MULTIPLE VITAMINS W/ MINERALS TAB 1 TABLET: TAB at 08:41

## 2023-01-01 RX ADMIN — LEVOTHYROXINE SODIUM 150 MCG: 0.15 TABLET ORAL at 08:50

## 2023-01-01 RX ADMIN — LEVOTHYROXINE SODIUM 150 MCG: 0.15 TABLET ORAL at 08:05

## 2023-01-01 RX ADMIN — Medication 400 MG: at 08:05

## 2023-01-01 RX ADMIN — PANTOPRAZOLE SODIUM 40 MG: 40 TABLET, DELAYED RELEASE ORAL at 06:23

## 2023-01-01 RX ADMIN — SODIUM BICARBONATE 1300 MG: 650 TABLET ORAL at 09:56

## 2023-01-01 RX ADMIN — Medication 400 MG: at 20:31

## 2023-01-01 RX ADMIN — MYCOPHENOLATE MOFETIL 500 MG: 250 CAPSULE ORAL at 20:31

## 2023-01-01 RX ADMIN — Medication 1 TABLET: at 08:04

## 2023-01-01 RX ADMIN — BUMETANIDE 2 MG: 1 TABLET ORAL at 09:46

## 2023-01-01 RX ADMIN — BUMETANIDE 1 MG: 1 TABLET ORAL at 08:41

## 2023-01-01 RX ADMIN — NIFEDIPINE 30 MG: 30 TABLET, FILM COATED, EXTENDED RELEASE ORAL at 08:04

## 2023-01-01 RX ADMIN — SODIUM BICARBONATE 1300 MG: 650 TABLET ORAL at 08:04

## 2023-01-01 RX ADMIN — ALPRAZOLAM 0.5 MG: 0.5 TABLET ORAL at 21:18

## 2023-01-01 RX ADMIN — CYANOCOBALAMIN TAB 1000 MCG 1000 MCG: 1000 TAB at 08:50

## 2023-01-01 RX ADMIN — CARVEDILOL 12.5 MG: 12.5 TABLET, FILM COATED ORAL at 08:41

## 2023-01-01 RX ADMIN — ASPIRIN 81 MG CHEWABLE TABLET 81 MG: 81 TABLET CHEWABLE at 08:50

## 2023-01-01 RX ADMIN — NIFEDIPINE 30 MG: 30 TABLET, FILM COATED, EXTENDED RELEASE ORAL at 09:36

## 2023-01-01 RX ADMIN — TACROLIMUS 1 MG: 1 CAPSULE ORAL at 09:36

## 2023-01-01 RX ADMIN — MYCOPHENOLATE MOFETIL 500 MG: 250 CAPSULE ORAL at 08:43

## 2023-01-01 RX ADMIN — CARVEDILOL 12.5 MG: 12.5 TABLET, FILM COATED ORAL at 23:22

## 2023-01-01 RX ADMIN — INSULIN GLARGINE 76 UNITS: 100 INJECTION, SOLUTION SUBCUTANEOUS at 20:27

## 2023-01-01 RX ADMIN — DARBEPOETIN ALFA 60 MCG: 60 SOLUTION INTRAVENOUS; SUBCUTANEOUS at 15:40

## 2023-01-01 RX ADMIN — LORATADINE 10 MG: 10 TABLET ORAL at 08:49

## 2023-01-01 RX ADMIN — NIFEDIPINE 30 MG: 30 TABLET, FILM COATED, EXTENDED RELEASE ORAL at 08:54

## 2023-01-01 RX ADMIN — CYANOCOBALAMIN TAB 1000 MCG 1000 MCG: 1000 TAB at 08:42

## 2023-01-01 RX ADMIN — APIXABAN 5 MG: 5 TABLET, FILM COATED ORAL at 20:30

## 2023-01-01 RX ADMIN — SODIUM BICARBONATE 1300 MG: 650 TABLET ORAL at 21:02

## 2023-01-01 RX ADMIN — APIXABAN 5 MG: 5 TABLET, FILM COATED ORAL at 08:42

## 2023-01-01 RX ADMIN — APIXABAN 5 MG: 5 TABLET, FILM COATED ORAL at 08:05

## 2023-01-01 RX ADMIN — CYANOCOBALAMIN TAB 1000 MCG 1000 MCG: 1000 TAB at 08:04

## 2023-01-01 RX ADMIN — PREDNISONE 50 MG: 20 TABLET ORAL at 12:42

## 2023-01-01 RX ADMIN — INSULIN LISPRO 1 UNITS: 100 INJECTION, SOLUTION INTRAVENOUS; SUBCUTANEOUS at 13:03

## 2023-01-01 RX ADMIN — ALBUTEROL SULFATE 2.5 MG: 2.5 SOLUTION RESPIRATORY (INHALATION) at 06:42

## 2023-01-01 RX ADMIN — TACROLIMUS 1 MG: 1 CAPSULE ORAL at 20:36

## 2023-01-01 RX ADMIN — DARBEPOETIN ALFA 60 MCG: 60 SOLUTION INTRAVENOUS; SUBCUTANEOUS at 08:55

## 2023-01-01 RX ADMIN — MYCOPHENOLATE MOFETIL 500 MG: 250 CAPSULE ORAL at 21:02

## 2023-01-01 RX ADMIN — INSULIN LISPRO 3 UNITS: 100 INJECTION, SOLUTION INTRAVENOUS; SUBCUTANEOUS at 17:44

## 2023-01-01 RX ADMIN — APIXABAN 5 MG: 5 TABLET, FILM COATED ORAL at 08:50

## 2023-01-01 RX ADMIN — PANTOPRAZOLE SODIUM 40 MG: 40 TABLET, DELAYED RELEASE ORAL at 06:15

## 2023-01-01 RX ADMIN — CARVEDILOL 12.5 MG: 12.5 TABLET, FILM COATED ORAL at 20:31

## 2023-01-01 RX ADMIN — ATORVASTATIN CALCIUM 40 MG: 40 TABLET, FILM COATED ORAL at 23:22

## 2023-01-01 RX ADMIN — PREDNISONE 5 MG: 5 TABLET ORAL at 08:05

## 2023-01-01 RX ADMIN — IRON SUCROSE 300 MG: 20 INJECTION, SOLUTION INTRAVENOUS at 09:37

## 2023-01-01 RX ADMIN — MAGNESIUM SULFATE HEPTAHYDRATE 2 G: 40 INJECTION, SOLUTION INTRAVENOUS at 11:50

## 2023-01-01 RX ADMIN — MULTIPLE VITAMINS W/ MINERALS TAB 1 TABLET: TAB at 08:50

## 2023-01-01 RX ADMIN — DARBEPOETIN ALFA 60 MCG: 60 INJECTION, SOLUTION INTRAVENOUS; SUBCUTANEOUS at 08:11

## 2023-01-01 RX ADMIN — MYCOPHENOLATE MOFETIL 500 MG: 250 CAPSULE ORAL at 09:07

## 2023-01-01 RX ADMIN — ASPIRIN 81 MG CHEWABLE TABLET 81 MG: 81 TABLET CHEWABLE at 08:05

## 2023-01-01 RX ADMIN — CARVEDILOL 12.5 MG: 12.5 TABLET, FILM COATED ORAL at 08:05

## 2023-01-01 RX ADMIN — LEVOTHYROXINE SODIUM 150 MCG: 0.15 TABLET ORAL at 08:42

## 2023-01-01 RX ADMIN — SODIUM BICARBONATE 1300 MG: 650 TABLET ORAL at 20:30

## 2023-01-01 RX ADMIN — TAMSULOSIN HYDROCHLORIDE 0.4 MG: 0.4 CAPSULE ORAL at 08:04

## 2023-01-01 RX ADMIN — ASPIRIN 81 MG CHEWABLE TABLET 81 MG: 81 TABLET CHEWABLE at 08:41

## 2023-01-01 RX ADMIN — Medication 1 TABLET: at 08:50

## 2023-01-01 RX ADMIN — DARBEPOETIN ALFA 60 MCG: 60 INJECTION, SOLUTION INTRAVENOUS; SUBCUTANEOUS at 08:08

## 2023-01-01 RX ADMIN — SODIUM BICARBONATE 1300 MG: 650 TABLET ORAL at 08:53

## 2023-01-01 RX ADMIN — Medication 5000 UNITS: at 08:50

## 2023-01-01 RX ADMIN — ACETAMINOPHEN 650 MG: 325 TABLET ORAL at 15:13

## 2023-01-01 RX ADMIN — ATORVASTATIN CALCIUM 40 MG: 40 TABLET, FILM COATED ORAL at 20:31

## 2023-01-01 RX ADMIN — MULTIPLE VITAMINS W/ MINERALS TAB 1 TABLET: TAB at 08:04

## 2023-01-01 RX ADMIN — INSULIN GLARGINE 76 UNITS: 100 INJECTION, SOLUTION SUBCUTANEOUS at 21:02

## 2023-01-01 RX ADMIN — TACROLIMUS 1 MG: 1 CAPSULE ORAL at 08:07

## 2023-01-01 RX ADMIN — DIPHENHYDRAMINE HYDROCHLORIDE 50 MG: 25 CAPSULE ORAL at 12:29

## 2023-01-01 RX ADMIN — TACROLIMUS 1 MG: 1 CAPSULE ORAL at 21:02

## 2023-01-01 RX ADMIN — FUROSEMIDE 20 MG: 10 INJECTION, SOLUTION INTRAMUSCULAR; INTRAVENOUS at 14:39

## 2023-01-01 RX ADMIN — ATORVASTATIN CALCIUM 40 MG: 40 TABLET, FILM COATED ORAL at 21:02

## 2023-01-01 RX ADMIN — FUROSEMIDE 10 MG/HR: 10 INJECTION, SOLUTION INTRAMUSCULAR; INTRAVENOUS at 17:38

## 2023-01-01 RX ADMIN — Medication 5000 UNITS: at 08:05

## 2023-01-01 RX ADMIN — SODIUM CHLORIDE 100 ML/HR: 9 INJECTION, SOLUTION INTRAVENOUS at 12:30

## 2023-01-01 RX ADMIN — APIXABAN 5 MG: 5 TABLET, FILM COATED ORAL at 23:22

## 2023-01-01 RX ADMIN — Medication 400 MG: at 08:50

## 2023-01-01 RX ADMIN — MYCOPHENOLATE MOFETIL 500 MG: 250 CAPSULE ORAL at 08:05

## 2023-01-01 RX ADMIN — Medication 1 TABLET: at 08:42

## 2023-01-01 RX ADMIN — PREDNISONE 5 MG: 5 TABLET ORAL at 08:50

## 2023-01-01 RX ADMIN — MAGNESIUM SULFATE HEPTAHYDRATE 2 G: 40 INJECTION, SOLUTION INTRAVENOUS at 11:06

## 2023-01-01 RX ADMIN — Medication 400 MG: at 15:09

## 2023-01-01 RX ADMIN — TAMSULOSIN HYDROCHLORIDE 0.4 MG: 0.4 CAPSULE ORAL at 08:50

## 2023-01-01 RX ADMIN — LORATADINE 10 MG: 10 TABLET ORAL at 08:04

## 2023-01-01 SDOH — SOCIAL STABILITY: SOCIAL INSECURITY: ARE YOU OR HAVE YOU BEEN THREATENED OR ABUSED PHYSICALLY, EMOTIONALLY, OR SEXUALLY BY ANYONE?: NO

## 2023-01-01 SDOH — SOCIAL STABILITY: SOCIAL INSECURITY: HAS ANYONE EVER THREATENED TO HURT YOUR FAMILY OR YOUR PETS?: NO

## 2023-01-01 SDOH — SOCIAL STABILITY: SOCIAL INSECURITY: DOES ANYONE TRY TO KEEP YOU FROM HAVING/CONTACTING OTHER FRIENDS OR DOING THINGS OUTSIDE YOUR HOME?: NO

## 2023-01-01 SDOH — SOCIAL STABILITY: SOCIAL INSECURITY: ARE THERE ANY APPARENT SIGNS OF INJURIES/BEHAVIORS THAT COULD BE RELATED TO ABUSE/NEGLECT?: NO

## 2023-01-01 SDOH — SOCIAL STABILITY: SOCIAL INSECURITY: ABUSE: ADULT

## 2023-01-01 SDOH — SOCIAL STABILITY: SOCIAL INSECURITY: HAVE YOU HAD THOUGHTS OF HARMING ANYONE ELSE?: NO

## 2023-01-01 SDOH — SOCIAL STABILITY: SOCIAL INSECURITY: DO YOU FEEL UNSAFE GOING BACK TO THE PLACE WHERE YOU ARE LIVING?: NO

## 2023-01-01 SDOH — SOCIAL STABILITY: SOCIAL INSECURITY: DO YOU FEEL ANYONE HAS EXPLOITED OR TAKEN ADVANTAGE OF YOU FINANCIALLY OR OF YOUR PERSONAL PROPERTY?: NO

## 2023-01-01 ASSESSMENT — COGNITIVE AND FUNCTIONAL STATUS - GENERAL
DAILY ACTIVITIY SCORE: 24
MOBILITY SCORE: 24
MOBILITY SCORE: 24
PATIENT BASELINE BEDBOUND: NO
DAILY ACTIVITIY SCORE: 24
DAILY ACTIVITIY SCORE: 24
MOBILITY SCORE: 24

## 2023-01-01 ASSESSMENT — ENCOUNTER SYMPTOMS
PSYCHIATRIC NEGATIVE: 1
APPETITE CHANGE: 0
VOMITING: 0
CHILLS: 0
MUSCULOSKELETAL NEGATIVE: 1
WHEEZING: 0
HEMATURIA: 0
FATIGUE: 1
CHEST TIGHTNESS: 0
EYES NEGATIVE: 1
SHORTNESS OF BREATH: 1
CONFUSION: 0
FATIGUE: 0
SHORTNESS OF BREATH: 1
BACK PAIN: 0
WEAKNESS: 0
CONSTITUTIONAL NEGATIVE: 1
PSYCHIATRIC NEGATIVE: 1
ABDOMINAL DISTENTION: 0
NEUROLOGICAL NEGATIVE: 1
FATIGUE: 0
SINUS PRESSURE: 0
CARDIOVASCULAR NEGATIVE: 1
DIARRHEA: 0
ABDOMINAL DISTENTION: 0
ABDOMINAL DISTENTION: 0
SINUS PRESSURE: 0
FREQUENCY: 0
ACTIVITY CHANGE: 0
COUGH: 0
COUGH: 0
BACK PAIN: 0
ABDOMINAL DISTENTION: 0
COUGH: 0
SHORTNESS OF BREATH: 0
DIARRHEA: 1
CONSTITUTIONAL NEGATIVE: 1
SHORTNESS OF BREATH: 0
HEMATOLOGIC/LYMPHATIC NEGATIVE: 1
NUMBNESS: 0
WEAKNESS: 0
SHORTNESS OF BREATH: 1
COUGH: 0
SINUS PAIN: 0
RESPIRATORY NEGATIVE: 1
DIARRHEA: 0
ALLERGIC/IMMUNOLOGIC NEGATIVE: 1
SORE THROAT: 0
MUSCULOSKELETAL NEGATIVE: 1
BACK PAIN: 0
SHORTNESS OF BREATH: 1
RECTAL PAIN: 0
VOMITING: 0
HEMATOLOGIC/LYMPHATIC NEGATIVE: 1
HEADACHES: 0
NAUSEA: 0
WHEEZING: 0
GASTROINTESTINAL NEGATIVE: 1
NERVOUS/ANXIOUS: 0
CARDIOVASCULAR NEGATIVE: 1
NUMBNESS: 0
EYES NEGATIVE: 1
APPETITE CHANGE: 0
ENDOCRINE NEGATIVE: 1
VOMITING: 0
SHORTNESS OF BREATH: 0
NAUSEA: 0
SINUS PAIN: 0
CHILLS: 0
VOMITING: 0
DIARRHEA: 1
ENDOCRINE NEGATIVE: 1
ACTIVITY CHANGE: 0
NAUSEA: 0
SORE THROAT: 0
PALPITATIONS: 0
NEUROLOGICAL NEGATIVE: 1

## 2023-01-01 ASSESSMENT — ACTIVITIES OF DAILY LIVING (ADL)
HEARING - RIGHT EAR: FUNCTIONAL
FEEDING YOURSELF: INDEPENDENT
WALKS IN HOME: INDEPENDENT
LACK_OF_TRANSPORTATION: PATIENT DECLINED
BATHING: INDEPENDENT
PATIENT'S MEMORY ADEQUATE TO SAFELY COMPLETE DAILY ACTIVITIES?: YES
ADEQUATE_TO_COMPLETE_ADL: YES
TOILETING: INDEPENDENT
HEARING - LEFT EAR: FUNCTIONAL
LACK_OF_TRANSPORTATION: NO
DRESSING YOURSELF: INDEPENDENT
JUDGMENT_ADEQUATE_SAFELY_COMPLETE_DAILY_ACTIVITIES: YES
GROOMING: INDEPENDENT

## 2023-01-01 ASSESSMENT — PATIENT HEALTH QUESTIONNAIRE - PHQ9
1. LITTLE INTEREST OR PLEASURE IN DOING THINGS: NOT AT ALL
1. LITTLE INTEREST OR PLEASURE IN DOING THINGS: SEVERAL DAYS
SUM OF ALL RESPONSES TO PHQ QUESTIONS 1-9: 10
2. FEELING DOWN, DEPRESSED OR HOPELESS: SEVERAL DAYS
2. FEELING DOWN, DEPRESSED OR HOPELESS: NOT AT ALL
6. FEELING BAD ABOUT YOURSELF - OR THAT YOU ARE A FAILURE OR HAVE LET YOURSELF OR YOUR FAMILY DOWN: NOT AT ALL
9. THOUGHTS THAT YOU WOULD BE BETTER OFF DEAD, OR OF HURTING YOURSELF: NOT AT ALL
10. IF YOU CHECKED OFF ANY PROBLEMS, HOW DIFFICULT HAVE THESE PROBLEMS MADE IT FOR YOU TO DO YOUR WORK, TAKE CARE OF THINGS AT HOME, OR GET ALONG WITH OTHER PEOPLE: NOT DIFFICULT AT ALL
1. LITTLE INTEREST OR PLEASURE IN DOING THINGS: SEVERAL DAYS
SUM OF ALL RESPONSES TO PHQ9 QUESTIONS 1 & 2: 1
8. MOVING OR SPEAKING SO SLOWLY THAT OTHER PEOPLE COULD HAVE NOTICED. OR THE OPPOSITE, BEING SO FIGETY OR RESTLESS THAT YOU HAVE BEEN MOVING AROUND A LOT MORE THAN USUAL: SEVERAL DAYS
SUM OF ALL RESPONSES TO PHQ9 QUESTIONS 1 AND 2: 0
3. TROUBLE FALLING OR STAYING ASLEEP OR SLEEPING TOO MUCH: NEARLY EVERY DAY
4. FEELING TIRED OR HAVING LITTLE ENERGY: NEARLY EVERY DAY
5. POOR APPETITE OR OVEREATING: SEVERAL DAYS
SUM OF ALL RESPONSES TO PHQ9 QUESTIONS 1 & 2: 2
2. FEELING DOWN, DEPRESSED OR HOPELESS: NOT AT ALL
7. TROUBLE CONCENTRATING ON THINGS, SUCH AS READING THE NEWSPAPER OR WATCHING TELEVISION: NOT AT ALL

## 2023-01-01 ASSESSMENT — ANXIETY QUESTIONNAIRES
IF YOU CHECKED OFF ANY PROBLEMS ON THIS QUESTIONNAIRE, HOW DIFFICULT HAVE THESE PROBLEMS MADE IT FOR YOU TO DO YOUR WORK, TAKE CARE OF THINGS AT HOME, OR GET ALONG WITH OTHER PEOPLE: SOMEWHAT DIFFICULT
2. NOT BEING ABLE TO STOP OR CONTROL WORRYING: SEVERAL DAYS
1. FEELING NERVOUS, ANXIOUS, OR ON EDGE: SEVERAL DAYS
GAD7 TOTAL SCORE: 7
7. FEELING AFRAID AS IF SOMETHING AWFUL MIGHT HAPPEN: SEVERAL DAYS
5. BEING SO RESTLESS THAT IT IS HARD TO SIT STILL: SEVERAL DAYS
3. WORRYING TOO MUCH ABOUT DIFFERENT THINGS: SEVERAL DAYS
6. BECOMING EASILY ANNOYED OR IRRITABLE: SEVERAL DAYS
4. TROUBLE RELAXING: SEVERAL DAYS

## 2023-01-01 ASSESSMENT — PAIN SCALES - GENERAL
PAINLEVEL: 0-NO PAIN
PAINLEVEL_OUTOF10: 1
PAINLEVEL_OUTOF10: 0 - NO PAIN
PAINLEVEL_OUTOF10: 1
PAINLEVEL_OUTOF10: 0 - NO PAIN
PAINLEVEL: 0-NO PAIN
PAINLEVEL_OUTOF10: 0 - NO PAIN
PAINLEVEL: 0-NO PAIN
PAINLEVEL_OUTOF10: 3
PAINLEVEL_OUTOF10: 0 - NO PAIN
PAINLEVEL: 2
PAINLEVEL_OUTOF10: 0 - NO PAIN
PAINLEVEL: 0-NO PAIN

## 2023-01-01 ASSESSMENT — LIFESTYLE VARIABLES
HOW OFTEN DO YOU HAVE A DRINK CONTAINING ALCOHOL: NEVER
HOW MANY STANDARD DRINKS CONTAINING ALCOHOL DO YOU HAVE ON A TYPICAL DAY: PATIENT DOES NOT DRINK
HOW OFTEN DO YOU HAVE 6 OR MORE DRINKS ON ONE OCCASION: NEVER
SUBSTANCE_ABUSE_PAST_12_MONTHS: NO
AUDIT-C TOTAL SCORE: 0
PRESCIPTION_ABUSE_PAST_12_MONTHS: NO
AUDIT-C TOTAL SCORE: 0
SKIP TO QUESTIONS 9-10: 1

## 2023-01-01 ASSESSMENT — COLUMBIA-SUICIDE SEVERITY RATING SCALE - C-SSRS
1. IN THE PAST MONTH, HAVE YOU WISHED YOU WERE DEAD OR WISHED YOU COULD GO TO SLEEP AND NOT WAKE UP?: NO
6. HAVE YOU EVER DONE ANYTHING, STARTED TO DO ANYTHING, OR PREPARED TO DO ANYTHING TO END YOUR LIFE?: NO
1. IN THE PAST MONTH, HAVE YOU WISHED YOU WERE DEAD OR WISHED YOU COULD GO TO SLEEP AND NOT WAKE UP?: NO
2. HAVE YOU ACTUALLY HAD ANY THOUGHTS OF KILLING YOURSELF?: NO
2. HAVE YOU ACTUALLY HAD ANY THOUGHTS OF KILLING YOURSELF?: NO
6. HAVE YOU EVER DONE ANYTHING, STARTED TO DO ANYTHING, OR PREPARED TO DO ANYTHING TO END YOUR LIFE?: NO

## 2023-01-01 ASSESSMENT — PAIN DESCRIPTION - LOCATION: LOCATION: ARM

## 2023-01-01 ASSESSMENT — PAIN DESCRIPTION - ORIENTATION: ORIENTATION: RIGHT

## 2023-01-09 ENCOUNTER — APPOINTMENT (OUTPATIENT)
Dept: URBAN - METROPOLITAN AREA CLINIC 204 | Age: 63
Setting detail: DERMATOLOGY
End: 2023-01-09

## 2023-01-09 PROBLEM — C44.329 SQUAMOUS CELL CARCINOMA OF SKIN OF OTHER PARTS OF FACE: Status: ACTIVE | Noted: 2023-01-09

## 2023-01-09 PROCEDURE — 17311 MOHS 1 STAGE H/N/HF/G: CPT

## 2023-01-09 PROCEDURE — OTHER RETURN TO REFERRING PROVIDER: OTHER

## 2023-01-09 PROCEDURE — 17312 MOHS ADDL STAGE: CPT

## 2023-01-09 PROCEDURE — OTHER MOHS SURGERY: OTHER

## 2023-01-09 PROCEDURE — 12053 INTMD RPR FACE/MM 5.1-7.5 CM: CPT

## 2023-01-09 PROCEDURE — OTHER CONSULTATION FOR MOHS SURGERY: OTHER

## 2023-01-09 PROCEDURE — OTHER MIPS QUALITY: OTHER

## 2023-01-09 NOTE — PROCEDURE: MIPS QUALITY
Detail Level: Simple
Quality 110: Preventive Care And Screening: Influenza Immunization: Influenza Immunization Administered during Influenza season
Quality 226: Preventive Care And Screening: Tobacco Use: Screening And Cessation Intervention: Patient screened for tobacco use, is a smoker AND received Cessation Counseling within the Previous 12 Months

## 2023-01-09 NOTE — HPI: MOHS SURGERY CONSULTATION
Has The Cancer Been Biopsied Before?: has been previously biopsied
Who Is Your Referring Provider?: Aquilino
When Was Your Biopsy?: 11/01/2022

## 2023-01-09 NOTE — PROCEDURE: CONSULTATION FOR MOHS SURGERY
Detail Level: Detailed
Body Location Override (Optional - Billing Will Still Be Based On Selected Body Map Location If Applicable): left lateral cheek
X Size Of Lesion In Cm (Optional): 0
Name Of The Referring Provider For Procedure: Aquilino
Incorporate Mauc In Note: No

## 2023-01-09 NOTE — PROCEDURE: MOHS SURGERY
Health Maintenance Summary     Topic Due On Due Status Completed On Postpone Until Reason    MAMMOGRAM - BREAST CANCER SCREENING Jan 14, 2018 Not Due Jan 14, 2016      Colorectal Cancer Screening - Colonoscopy Apr 6, 2002 Postponed  Nov 5, 2017 Patient Refused    Osteoporosis Screening  Completed Aug 3, 2012      Immunization-Zoster  Completed Jun 22, 2012      Immunization - Pneumococcal Apr 6, 2017 Overdue       Immunization - TDAP Pregnancy  Hidden       Medicare Wellness Visit Apr 6, 2017 Postponed  Oct 10, 2017 Patient Refused    IMMUNIZATION - DTaP/Tdap/Td May 7, 2018 Not Due May 7, 2008      Immunization-Influenza  Completed Nov 7, 2016            Patient is due for topics as listed above, she wishes to proceed with Immunization(s), but declines MWV (Medicare Wellness Visit) at this time .       Consent (Marginal Mandibular)/Introductory Paragraph: The rationale for Mohs was explained to the patient and consent was obtained. The risks, benefits and alternatives to therapy were discussed in detail. Specifically, the risks of damage to the marginal mandibular branch of the facial nerve, infection, scarring, bleeding, prolonged wound healing, incomplete removal, allergy to anesthesia, and recurrence were addressed. Prior to the procedure, the treatment site was clearly identified and confirmed by the patient. All components of Universal Protocol/PAUSE Rule completed.

## 2023-01-09 NOTE — PROCEDURE: MOHS SURGERY
Scc Well Differentiated Histology Text: Arising from the epidermis is a keratin-producing proliferation of atypical keratinocytes with invasion into the underlying dermis. Mitoses and atypical forms are evident. Intercellular bridge and keratin adriano formation are evident.

## 2023-03-01 LAB
ALBUMIN (G/DL) IN SER/PLAS: 3.7 G/DL (ref 3.4–5)
ANION GAP IN SER/PLAS: 10 MMOL/L (ref 10–20)
CALCIUM (MG/DL) IN SER/PLAS: 9.2 MG/DL (ref 8.6–10.3)
CARBON DIOXIDE, TOTAL (MMOL/L) IN SER/PLAS: 26 MMOL/L (ref 21–32)
CHLORIDE (MMOL/L) IN SER/PLAS: 110 MMOL/L (ref 98–107)
CREATININE (MG/DL) IN SER/PLAS: 2.25 MG/DL (ref 0.5–1.3)
ERYTHROCYTE DISTRIBUTION WIDTH (RATIO) BY AUTOMATED COUNT: 13.3 % (ref 11.5–14.5)
ERYTHROCYTE MEAN CORPUSCULAR HEMOGLOBIN CONCENTRATION (G/DL) BY AUTOMATED: 31.1 G/DL (ref 32–36)
ERYTHROCYTE MEAN CORPUSCULAR VOLUME (FL) BY AUTOMATED COUNT: 92 FL (ref 80–100)
ERYTHROCYTES (10*6/UL) IN BLOOD BY AUTOMATED COUNT: 3.72 X10E12/L (ref 4.5–5.9)
GFR MALE: 32 ML/MIN/1.73M2
GLUCOSE (MG/DL) IN SER/PLAS: 161 MG/DL (ref 74–99)
HEMATOCRIT (%) IN BLOOD BY AUTOMATED COUNT: 34.1 % (ref 41–52)
HEMOGLOBIN (G/DL) IN BLOOD: 10.6 G/DL (ref 13.5–17.5)
LEUKOCYTES (10*3/UL) IN BLOOD BY AUTOMATED COUNT: 10.6 X10E9/L (ref 4.4–11.3)
PHOSPHATE (MG/DL) IN SER/PLAS: 4 MG/DL (ref 2.5–4.9)
PLATELETS (10*3/UL) IN BLOOD AUTOMATED COUNT: 255 X10E9/L (ref 150–450)
POTASSIUM (MMOL/L) IN SER/PLAS: 4.4 MMOL/L (ref 3.5–5.3)
SODIUM (MMOL/L) IN SER/PLAS: 142 MMOL/L (ref 136–145)
TACROLIMUS (NG/ML) IN BLOOD: 7.4 NG/ML (ref 2–15)
UREA NITROGEN (MG/DL) IN SER/PLAS: 58 MG/DL (ref 6–23)

## 2023-03-21 NOTE — TELEPHONE ENCOUNTER
Patient called stating that the Levemir stopped making the flex touch and is only making a pen that injects up to 60 units and he uses 80 units; wanted to know what he should do  ------------------------------  Pt also reports increasing BP  170/88  200/91  198/93  202/95

## 2023-05-23 PROBLEM — D84.9 IMMUNOSUPPRESSION (MULTI): Status: ACTIVE | Noted: 2023-01-01

## 2023-05-23 PROBLEM — I10 HYPERTENSION: Status: ACTIVE | Noted: 2023-01-01

## 2023-05-23 PROBLEM — Z86.010 HISTORY OF COLON POLYPS: Status: ACTIVE | Noted: 2023-01-01

## 2023-05-23 PROBLEM — E78.5 DYSLIPIDEMIA: Status: ACTIVE | Noted: 2023-01-01

## 2023-05-23 PROBLEM — R80.9 PROTEINURIA: Status: ACTIVE | Noted: 2023-01-01

## 2023-05-23 PROBLEM — M25.559 ARTHRALGIA OF HIP: Status: ACTIVE | Noted: 2023-01-01

## 2023-05-23 PROBLEM — E78.5 HYPERLIPIDEMIA: Status: ACTIVE | Noted: 2023-01-01

## 2023-05-23 PROBLEM — Z86.0100 HISTORY OF COLON POLYPS: Status: ACTIVE | Noted: 2023-01-01

## 2023-05-23 PROBLEM — E11.9 DIABETES TYPE 2, CONTROLLED (MULTI): Status: ACTIVE | Noted: 2023-01-01

## 2023-05-23 PROBLEM — Z94.0 HISTORY OF KIDNEY TRANSPLANT (HHS-HCC): Status: ACTIVE | Noted: 2019-01-09

## 2023-05-23 PROBLEM — E03.9 HYPOTHYROIDISM: Status: ACTIVE | Noted: 2023-01-01

## 2023-05-23 PROBLEM — I82.409 DVT (DEEP VENOUS THROMBOSIS) (MULTI): Status: ACTIVE | Noted: 2023-01-01

## 2023-05-23 PROBLEM — G47.33 OSA (OBSTRUCTIVE SLEEP APNEA): Status: ACTIVE | Noted: 2023-01-01

## 2023-05-23 PROBLEM — D51.9 ANEMIA, B12 DEFICIENCY: Status: ACTIVE | Noted: 2023-01-01

## 2023-05-23 PROBLEM — K21.9 GASTROESOPHAGEAL REFLUX DISEASE: Status: ACTIVE | Noted: 2023-01-01

## 2023-05-23 NOTE — PROGRESS NOTES
Subjective   Patient ID: Garcia Drummond is a 62 y.o. male who presents for Follow-up (3 month).  HPI  Patient is here today for 3 mo follow up   Patient did see Dr Berry, had PFTs with walking pulse ox.  Goes back this week to find out the results . Walking pulse ox was ok, do not see repeat PFTs yet.   Continuing to follow up with nephrology and transplant. Cr 2.3, GFR 30.   A1c stable at 8.2%   Did not tolerate trulicity, felt like it made him dehydrated.  80 units lantus with sliding scale.     Review of Systems   Constitutional:  Negative for activity change, appetite change, chills and fatigue.   HENT:  Negative for congestion, postnasal drip, sinus pressure, sinus pain and sore throat.    Respiratory:  Negative for cough, shortness of breath and wheezing.    Cardiovascular:  Negative for chest pain and leg swelling.   Gastrointestinal:  Negative for abdominal distention, diarrhea, nausea and vomiting.   Musculoskeletal:  Negative for back pain.   Neurological:  Negative for weakness and numbness.       Objective   /66 (BP Location: Right arm, Patient Position: Sitting, BP Cuff Size: Adult)   Pulse 81   Ht 1.829 m (6')   Wt 130 kg (286 lb)   BMI 38.79 kg/m²     Physical Exam  Constitutional:       General: He is not in acute distress.     Appearance: Normal appearance.   HENT:      Head: Normocephalic.      Nose: Nose normal.      Mouth/Throat:      Mouth: Mucous membranes are dry.      Pharynx: No oropharyngeal exudate.   Eyes:      General:         Right eye: No discharge.         Left eye: No discharge.      Extraocular Movements: Extraocular movements intact.      Pupils: Pupils are equal, round, and reactive to light.   Cardiovascular:      Rate and Rhythm: Normal rate and regular rhythm.      Heart sounds: No murmur heard.     No gallop.   Pulmonary:      Effort: Pulmonary effort is normal. No respiratory distress.      Breath sounds: Wheezing present.   Musculoskeletal:         General: No  swelling. Normal range of motion.   Skin:     General: Skin is warm and dry.      Coloration: Skin is not jaundiced.   Neurological:      General: No focal deficit present.      Mental Status: He is alert and oriented to person, place, and time.      Cranial Nerves: No cranial nerve deficit.   Psychiatric:         Mood and Affect: Mood normal.         Behavior: Behavior normal.           Assessment/Plan   Problem List Items Addressed This Visit          Nervous    AMPARO (obstructive sleep apnea) - Primary       Circulatory    DVT (deep venous thrombosis) (CMS/[facility])    Hypertension       Digestive    Gastroesophageal reflux disease       Endocrine/Metabolic    Diabetes type 2, controlled (CMS/[facility])    Hypothyroidism       Other    Hyperlipidemia     1. Recurrent DVT  = on Eliquis    2. DMII   = on 5mg of prednisone daily   - is doing 85 units of Levemir qhs  - continue Humalog   - repeat a1c 7.8, was 7.7  - trulicity did not tolerate     3. Transplanted kidney, some donor mediated rejection  - following with transplant nephrology, scheduled for repeat kidney bx  - on prednisone, CellCept and tacrolimus  - continuing to watch kidney function     4. Anxiety, insomnia   - uses xanax prn, last fill 5/22    5. Sob on exertion   - stopped smoking   - seeing pulm, will go over results next week      **Patient Discussion/Summary    Risk Factors Identified During Visit: Weight: BMI < 18.5 or > 25.   Influenza: influenza vaccine was previously given.   Pneumovax 23/Prevnar 15: Pneumovax 23/Prevnar 15 vaccine was previously given.   Prevnar 20: Prevnar 20 vaccine was previously given.   Shingles Vaccine: Shingles vaccine was previously given.   Prostate cancer screening: the patient declines screening.   Colorectal Cancer Screening: screening is current.   Abdominal Aortic Aneurysm screening: screening not indicated.   HIV screening: screening not indicated.        Final diagnoses:   [G47.33] AMPARO (obstructive sleep apnea)   [I10]  Primary hypertension   [I82.4Z9] Deep vein thrombosis (DVT) of distal vein of lower extremity, unspecified chronicity, unspecified laterality (CMS/[facility])   [K21.9] Gastroesophageal reflux disease without esophagitis   [E03.9] Acquired hypothyroidism   [E11.22, N18.30, Z79.4] Controlled type 2 diabetes mellitus with stage 3 chronic kidney disease, with long-term current use of insulin (CMS/[facility])   [E78.2] Mixed hyperlipidemia

## 2023-09-21 NOTE — PROGRESS NOTES
"Subjective   Patient ID: Garcia Drummond is a 62 y.o. male who presents for Follow-up (4 month).  HPI  PATient is here today for 4 mo follow up   Patient has been having diarrhea x the last 1-2 mo.  Patient had stool studies were negative for c diff and other pathogens.  Pt had some mild abd pain, did go to the ED on 9/16 and had a CT scan which showed sigmoid diverticulitis, no evidence of abscess or perforation.   Patient still has 4 more mee of antibiotics.   Reports that the diarrhea is improving but still has it about twice per day. More solid than it was.     Pts kidney is continuing to reject, they are considering putting him back on the kidney transplant list but will need to have eval completed first. Watched the videos that were required of him to start the process.     DMII - A1c 7.7%   Pt reports blood sugars are running in the 140s, no hypoglycemia, not getting anything over 150. But he has had decreased appetite with the diverticulitis.   He has lost 15 lbs over the last two months.     Review of Systems   Constitutional:  Negative for activity change, appetite change, chills and fatigue.   HENT:  Negative for congestion, postnasal drip, sinus pressure, sinus pain and sore throat.    Respiratory:  Negative for cough, shortness of breath and wheezing.    Cardiovascular:  Negative for chest pain and leg swelling.   Gastrointestinal:  Positive for diarrhea. Negative for abdominal distention, nausea and vomiting.   Musculoskeletal:  Negative for back pain.   Neurological:  Negative for weakness and numbness.       Objective   BP (!) 80/47 (BP Location: Right arm, Patient Position: Sitting, BP Cuff Size: Adult)   Pulse 80   Ht 1.88 m (6' 2\")   Wt 127 kg (279 lb)   BMI 35.82 kg/m²     Physical Exam  Constitutional:       General: He is not in acute distress.     Appearance: Normal appearance. He is not toxic-appearing.      Comments: +pale   HENT:      Head: Normocephalic and atraumatic.      Nose: Nose " normal.      Mouth/Throat:      Mouth: Mucous membranes are moist.      Pharynx: Oropharynx is clear.   Eyes:      Extraocular Movements: Extraocular movements intact.      Conjunctiva/sclera: Conjunctivae normal.      Pupils: Pupils are equal, round, and reactive to light.   Cardiovascular:      Rate and Rhythm: Normal rate and regular rhythm.      Heart sounds: No murmur heard.     No friction rub. No gallop.   Pulmonary:      Effort: Pulmonary effort is normal.      Breath sounds: Normal breath sounds.   Abdominal:      General: Bowel sounds are normal. There is no distension.      Palpations: Abdomen is soft. There is no mass.      Tenderness: There is no abdominal tenderness. There is no guarding.   Musculoskeletal:         General: No swelling. Normal range of motion.      Cervical back: Normal range of motion.   Skin:     General: Skin is warm and dry.   Neurological:      General: No focal deficit present.      Mental Status: He is alert and oriented to person, place, and time.   Psychiatric:         Mood and Affect: Mood normal.         Thought Content: Thought content normal.         Judgment: Judgment normal.           Assessment/Plan   Problem List Items Addressed This Visit       Diabetes type 2, controlled (CMS/[facility])    DVT (deep venous thrombosis) (CMS/[facility])    Gastroesophageal reflux disease    Hyperlipidemia    Hypertension    Hypothyroidism    AMPARO (obstructive sleep apnea)    Immunosuppression (CMS/[facility])     Other Visit Diagnoses       Chronic fatigue    -  Primary    Anemia due to stage 4 chronic kidney disease (CMS/[facility])        Screening for prostate cancer              1. Recurrent DVT  = on Eliquis     2. DMII   = on 5mg of prednisone daily   - is doing 85 units of Levemir qhs  - continue Humalog   - repeat A1c 7.7%   - trulicity did not tolerate      3. Transplanted kidney,  donor mediated rejection, considering getting back on transplant list and starting eval   - following with transplant  nephrology  - on prednisone, CellCept and tacrolimus  - continuing to watch kidney function      4. Anxiety, insomnia   - uses xanax prn, last fill 5/22     5. Sob on exertion   - stopped smoking         6. Diarrhea - > diverticulitis   - reviewed CT scan, finishing antibiotics    7. Anemia, probably of Chronic disease   - will repeat cbc, iron, ferretin, b12     8. Hypothyroidism  - will check tsh   - continue synthroid     9. Will give flu hot today     10. Hypotensive, took coreg and nifedipine this morning, advised to check bp this evening prior to taking evening meds and hold bp meds until >100 systolic   Final diagnoses:   [R53.82] Chronic fatigue   [N18.4, D63.1] Anemia due to stage 4 chronic kidney disease (CMS/[facility])   [E03.9] Acquired hypothyroidism   [Z12.5] Screening for prostate cancer   [E11.22, N18.30, Z79.4] Controlled type 2 diabetes mellitus with stage 3 chronic kidney disease, with long-term current use of insulin (CMS/[facility])   [I10] Primary hypertension   [E78.2] Mixed hyperlipidemia   [I82.4Z9] Deep vein thrombosis (DVT) of distal vein of lower extremity, unspecified chronicity, unspecified laterality (CMS/[facility])   [K21.9] Gastroesophageal reflux disease without esophagitis   [D84.9] Immunosuppression (CMS/[facility])   [G47.33] AMPARO (obstructive sleep apnea)

## 2023-10-06 NOTE — TELEPHONE ENCOUNTER
Per Dr. Cancino called patient to find out if he was taking folate. Denied taking any folate.  Also, discussed with patient if he was ok if I reached out to Dr. Selby to assume the care of his transplanted kidney per Dr. Cancino.  Patient is ok having his care handled by Dr. Selby his local nephrologist.  Will reach out to Dr. Selby if he is ok with taking on care of patient from this point on.

## 2023-10-07 PROBLEM — R06.02 INCREASING SHORTNESS OF BREATH: Status: ACTIVE | Noted: 2023-01-01

## 2023-10-07 PROBLEM — J45.909 ASTHMA (HHS-HCC): Status: ACTIVE | Noted: 2019-06-26

## 2023-10-07 PROBLEM — G47.9 SLEEP DISTURBANCE: Status: ACTIVE | Noted: 2023-01-01

## 2023-10-07 PROBLEM — K57.32 DIVERTICULITIS OF LARGE INTESTINE: Status: ACTIVE | Noted: 2023-01-01

## 2023-10-07 PROBLEM — J44.9 COPD (CHRONIC OBSTRUCTIVE PULMONARY DISEASE) (MULTI): Status: ACTIVE | Noted: 2023-01-01

## 2023-10-07 PROBLEM — M16.12 ARTHRITIS OF LEFT HIP: Status: ACTIVE | Noted: 2023-01-01

## 2023-10-07 PROBLEM — T86.91 REJECTION OF TRANSPLANTED ORGAN: Status: ACTIVE | Noted: 2023-01-01

## 2023-10-07 PROBLEM — E79.0 ELEVATED URIC ACID IN BLOOD: Status: ACTIVE | Noted: 2023-01-01

## 2023-10-07 PROBLEM — R76.8 DONOR SPECIFIC ANTIBODY (DSA) POSITIVE: Status: ACTIVE | Noted: 2023-01-01

## 2023-10-07 PROBLEM — R06.2 WHEEZING: Status: ACTIVE | Noted: 2023-01-01

## 2023-10-07 PROBLEM — J06.9 UPPER RESPIRATORY TRACT INFECTION: Status: ACTIVE | Noted: 2023-01-01

## 2023-10-07 PROBLEM — D63.1 ANEMIA IN CKD (CHRONIC KIDNEY DISEASE): Status: ACTIVE | Noted: 2023-01-01

## 2023-10-07 PROBLEM — R33.9 URINARY RETENTION: Status: ACTIVE | Noted: 2023-01-01

## 2023-10-07 PROBLEM — N18.9 ANEMIA IN CKD (CHRONIC KIDNEY DISEASE): Status: ACTIVE | Noted: 2023-01-01

## 2023-10-07 PROBLEM — R31.9 HEMATURIA: Status: ACTIVE | Noted: 2023-01-01

## 2023-10-07 PROBLEM — G47.00 INSOMNIA: Status: ACTIVE | Noted: 2023-01-01

## 2023-10-10 NOTE — PROGRESS NOTES
Subjective   Referred by Dr. Selby.    Garcia Drummond is a 63 y.o. male who presents for kidney transplant evaluation visit accompanied by his wife today.  -Mr. 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  Posttransplant he developed a chronic AMR with persistent DSA s/p several treatments with steroids as well as IV immunoglobulin but still kidney function going down was referred here for Retransplant evaluation.    -Other history   -Diabetes for 20+ years have mild neuropathy in bilateral feet.  Patient had bilateral cataract surgeries.  -Hypertension for a while on medications.  -Denied any coronary artery disease or strokes.  -Patient had a history of COPD/chronic bronchitis for which he was on inhalers.  -Denied any psychiatric problems or hospitalizations.  -Denied any use of pain medications or history of kidney stones or prior transfusions.  But received IV immunoglobulin as a part of chronic AMR management.  -Patient had a prior diagnosis of skin cancer s/p removal on the face.  -Recent hospitalization for diverticulitis.      Surgical history: History of kidney transplant in 2018, prior PD catheter s/p removal, hernia repair/umbilical, tonsillectomy.    Family history: Mom have genital cancer otherwise no other family members have any kidney problems.    Social history: Lives with his wife and 3 kids.  His stepdaughter is a prior kidney donor.  -Currently retired used to be a social drinker used to do marijuana before but quit long back.  He quit smoking almost 3 years before used to smoke 1 pack/day.          Review of Systems   Respiratory:  Negative for cough, chest tightness  "and shortness of breath.    Cardiovascular:  Negative for chest pain, palpitations and leg swelling.   Gastrointestinal:  Negative for abdominal distention, diarrhea, nausea and vomiting.   Genitourinary:  Negative for frequency, hematuria and urgency.   Musculoskeletal:  Negative for back pain.   Neurological:  Negative for headaches.   Psychiatric/Behavioral:  Negative for confusion. The patient is not nervous/anxious.         Objective   There were no vitals filed for this visit.   Physical Exam  HENT:      Head: Normocephalic and atraumatic.      Nose: Nose normal.      Mouth/Throat:      Mouth: Mucous membranes are moist.   Eyes:      Extraocular Movements: Extraocular movements intact.      Pupils: Pupils are equal, round, and reactive to light.   Cardiovascular:      Rate and Rhythm: Normal rate.      Pulses: Normal pulses.      Heart sounds: Normal heart sounds.   Pulmonary:      Effort: Pulmonary effort is normal.   Abdominal:      Palpations: Abdomen is soft.      Tenderness: There is no abdominal tenderness. There is no guarding.   Musculoskeletal:         General: Normal range of motion.      Cervical back: Normal range of motion.   Skin:     General: Skin is warm.   Neurological:      General: No focal deficit present.      Mental Status: Mental status is at baseline.   Psychiatric:         Mood and Affect: Mood normal.            Lab Review    Blood Type: No results found for: \"ABORH\"  Lab Results   Component Value Date    CREATININE 2.96 (H) 10/10/2023    K 4.8 09/21/2023    GLUCOSE 138 (H) 09/21/2023    HCT 29.1 (L) 10/10/2023    WBC 10.3 10/10/2023     10/10/2023    CALCIUM 8.6 09/21/2023       Current Outpatient Medications:     albuterol 90 mcg/actuation inhaler, Inhale every 6 hours if needed., Disp: , Rfl:     ALPRAZolam (Xanax) 0.5 mg tablet, Take by mouth., Disp: , Rfl:     aspirin 81 mg chewable tablet, Chew., Disp: , Rfl:     atorvastatin (Lipitor) 40 mg tablet, Take 1 tablet (40 mg) " by mouth once daily., Disp: , Rfl:     blood sugar diagnostic (Accu-Chek SmartView Test Strip) strip, USE 3 TIMES DAILY, Disp: 300 strip, Rfl: 2    calcium 500 mg calcium (1,250 mg) tablet, Take 1 tablet (1,250 mg) by mouth once daily., Disp: , Rfl:     carvedilol (Coreg) 12.5 mg tablet, TAKE 1 TABLET BY MOUTH TWICE  DAILY, Disp: 180 tablet, Rfl: 3    cyanocobalamin (Vitamin B-12) 1,000 mcg tablet, Take 1 tablet (1,000 mcg) by mouth once daily. as directed, Disp: , Rfl:     Eliquis 5 mg tablet, Take 1 tablet (5 mg) by mouth 2 times a day., Disp: , Rfl:     fish oil concentrate (Omega-3) 120-180 mg capsule, Take by mouth., Disp: , Rfl:     HumaLOG KwikPen Insulin 100 unit/mL injection, INJECT SUBCUTANEOUSLY 20  UNITS BEFORE MEALS, Disp: 60 mL, Rfl: 3    insulin degludec (Tresiba FlexTouch U-100) 100 unit/mL (3 mL) injection, Inject 84 Units under the skin once daily at bedtime. Take as directed per insulin instructions., Disp: 10 mL, Rfl: 3    insulin degludec (Tresiba FlexTouch U-200) 200 unit/mL (3 mL) injection, Inject 84 Units under the skin once daily at bedtime., Disp: 10 mL, Rfl: 12    levothyroxine (Synthroid, Levoxyl) 150 mcg tablet, Take 1 tablet (150 mcg) by mouth once daily., Disp: 90 tablet, Rfl: 1    losartan (Cozaar) 100 mg tablet, Take 1 tablet (100 mg) by mouth once daily., Disp: , Rfl:     magnesium gluconate (Magonate) 27.5 mg magne- sium (500 mg) tablet, Take 1 tablet (27.5 mg) by mouth once daily., Disp: , Rfl:     multivitamin tablet, Take 1 tablet by mouth once daily., Disp: , Rfl:     mycophenolate (Cellcept) 250 mg capsule, Take by mouth., Disp: , Rfl:     NIFEdipine CC 30 mg 24 hr tablet, Take 1 tablet (30 mg) by mouth once daily., Disp: , Rfl:     omeprazole (PriLOSEC) 20 mg DR capsule, Take 1 capsule (20 mg) by mouth once daily., Disp: , Rfl:     predniSONE (Deltasone) 5 mg tablet, Take 1 tablet (5 mg) by mouth once daily., Disp: , Rfl:     tacrolimus (Prograf) 0.5 mg capsule, Take by  mouth., Disp: , Rfl:     tacrolimus (Prograf) 1 mg capsule, Take 2 capsules (2 mg) by mouth every 12 hours., Disp: , Rfl:     tamsulosin (Flomax) 0.4 mg 24 hr capsule, TAKE 1 CAPSULE BY MOUTH  DAILY, Disp: 90 capsule, Rfl: 3    allopurinol (Zyloprim) 100 mg tablet, Take 1 half tablet by mouth once daily., Disp: , Rfl:     amoxicillin-pot clavulanate (Augmentin) 875-125 mg tablet, TAKE 1 TABLET BY MOUTH EVERY 12 HOURS (Patient not taking: Reported on 10/10/2023), Disp: 14 tablet, Rfl: 0    levocetirizine (Xyzal) 5 mg tablet, Take 1 tablet (5 mg) by mouth once daily in the evening., Disp: , Rfl:     mometasone (Nasonex) 50 mcg/actuation nasal spray, Administer 1 spray into affected nostril(s) 2 times a day., Disp: , Rfl:    Cardiographics  ECG:     Assessment/Plan  Mr. 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    Seems to be a marginal candidate for kidney transplant:  -Need updated cardiac testing with a stress test and CT cardiac scoring.  Need complete cardiology clearance.  -Karnofsky score is around 70-80.  -There is a recent CAT scan abdomen pelvis as of 9/20/2023 which showed diverticulitis otherwise having mild atherosclerotic changes in the aorta and its branches.  -Patient had a colonoscopy done in 9/14/2020 which showed diverticulosis of the large intestine.  Recommended a 10-year follow-up.  -Patient had a chronic bronchitis was found to have a nodule on the lung.  Need a CAT scan of the chest lung specific.  -Need to evaluate for ANTHONY/TBI as well as a venous duplex.  -Patient signed up for high Kd PI, DCD kidneys.  -Discussed with him about risks of transplant surgery like bleeding, thrombosis and losing the  graft.  -Also discussed about chronic immunosuppression and pros and cons.    Advantages and disadvantages of transplantation were reviewed. Discussed the operative procedure and potential complications, particularly in regard to the need for re-operation. I addressed issues of post-operative recovery and follow-up. Lastly, I discussed the need for immunosuppressive therapy and the risk associated with this treatment.  He appears to have good understanding of his medical condition and the transplant process.  Additional Information:  -The patient was re-educated regarding the responsibilities of being listed on the transplant waitlist.  - Patient encouraged to avoid blood transfusion unless it's deemed a medically necessary.  -Educated patient on the current allocation policy per [facility] regarding kidney and/or kidney-pancreas/pancreas transplant.  - Education covered the need for monthly serum samples to be drawn and sent to the [facility] while actively listed.  - The patient was told to inform the Pre Transplant office if there are any changes in their medical condition, demographics, insurance coverage ( including medication coverage) and or dialysis units.  - The patient was reminded to fax test results of studies that were obtained outside [facility] facility.  -Explained lifetime immunosuppression therapy and frequency of blood draws/labs post-op and post-op course of treatment.

## 2023-10-10 NOTE — PROGRESS NOTES
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 Transplant with chronic AMR, shown on biopsy . Was treated steroid taper and IVIG. Remaining preemptive to dialysis, however with progressivly worsening renal function.     In  he had a right lower extremity spontaneous, extensive DVT. He was started on eliquis and seen by hematology. Work up found him to be heterozygous for factor V Leiden. Prior to this episode he had never had DVT or PE. Interval US completed 2021 did show chronic changes from his DVT in the right lower extremity.      He also recently was seen for diarrhea and diagnosed with diverticulitis. He is to complete his antibiotic course soon. Never had prior episodes of diverticulitis.     Last colonoscopy 2020 notable for diverticula but no polyps. Recommended 10 year follow up.    PMHx:  DM2, HTN, hypothyroidism, ESRD/ membranous GN previously on dialysis, now s/p renal transplantation done 2018     PAST SURGICAL HISTORY  1. Live donor renal transplant  2. Peritoneal dialysis catheter placement with subsequent removal  3. Bilateral cataract extraction and lens implantation  4. Appendectomy   5. Remote tonsillectomy  6. Bowel resection  7. Umbilical hernia with mesh     FAMILY HISTORY  Mother  with a history of heart failure and A.fib  Father  with a history of heart failure  One brother alive with a recent stroke  One brother  from internal bleeding complications after undergoing an EGD  No biological children  No family history of clotting or bleeding disorders      SOCIAL HISTORY  Patient is  living at home with his wife  He is on disability but he was a  before getting sick  He quit smoking last year he actually quit 5-6 years ago but he resumed smoking this past year with about a pack a day  No alcohol or drug abuse history      Allergies:  Allergies    Allergen Reactions    Corticotropin Unknown    Empagliflozin Unknown    Iodinated Contrast Media Unknown     Objective:  There were no vitals taken for this visit.    Exam:  Gen: NAD, AAOx3  Card: regular rate and rhythm  Resp: sat well room air, equal chest rise  Abd: soft, non tender, midline and left lower abdominal surgical scars. No hernia. obese  Extrem: no edema BLE  Vasc: Pulses right femoral difficult to palpate. No sores or open wounds.     Assessment/Plan:    Mr. Drummond is a good candidate for retransplantation pending completion of work up including:    RLE ANTHONY and venous duplex. Has history of extensive RLE DVT with chronic changes on last duplex in . Right femoral artery difficult to palpate but may be in part secondary to habitus. Has transplant on left so right iliac  would be preferred. Does have recent CT with moderate calcifications in common iliacs but minimal in external.   Would send for weight management. Current BMI 39.2 not limiting but has central distribution of weight and additional gain may complicate surgery.   Completion of cardiac clearance  Monitoring of any further episodes of diverticulitis - if recurring may need interval colonoscopy sooner     Of note he is heterozygous for Factor V Leiden with a history of spontaneous DVT and is on eliquis.      I had a discussion with this patient regarding 1 year graft and patient survival statistics following renal transplantation for both living and  donor allograft recipients. This data included [facility] data compared to National data readily available for review on https://www.SRTR.org. The patient also had attended the kidney transplant education class provided by the transplant institute.     The difference between allograft function was discussed comparing living donor, KDPI 0-85%, and >85% kidneys.     Further discussion included:  -The transplant selection committee process.  -The need for lifelong  immunosuppressive therapy, and the side effects of these medications including the risk of infections, cancer, and lymphoma.  -The wait list time approximately is 5 years or more for  donor transplants and the statistical superiority of a living donor.     - Using identified donors with risk criteria for transmission of infection  -Potential transmission of infectious disease from any  donors, as well as living donors.   -The possibility of transmission of tumors and infections via the transplanted organ.  -The inability to completely test for all potential harmful tumors or infectious agents.  -The possibility of listing at multiple locations.     Surgical complications including need for reoperation(s) including but not limited to:  -Bleeding.  -Repair of leaks.  -Control of infection.  -Possible kidney transplant removal.     The medical complications including but not limited to:  -Death.  -Cardiac.  -Pulmonary.  -Infectious.  -Neurologic.  -Other Complications.     We also discussed how the kidney transplant could function:  -Non-function and possible kidney transplant removal within the first 3 to 6 months.  -Delayed graft function (dialysis needed after transplant).  -The potential of recurrence of kidney disease leading to kidney transplant graft loss.

## 2023-10-10 NOTE — PROGRESS NOTES
LISTING EDUCATION    Patient educated regarding the following prior to placement on the transplant waiting list:   The patient?s medical condition, prognosis, and treatment plan.   The expectations and patient responsibilities while on the waiting list, including:   Keeping the transplant center informed of any changes in contact information or insurance coverage   Notifying the transplant center of any changes in medical status   Required testing and/or re-evaluation appointments while awaiting transplant   An overview of the surgical procedure, including potential risks and alternatives.   Information regarding what to expect during the inpatient admission and recovery period.   A discussion regarding organ offers and types of potential donors, including potential risks that may be associated with specific types of donors that could affect the success of the transplant or the health of the patient.  The right to refuse transplantation.     Patient was given the opportunity to have questions answered. Patient was provided a copy of the informed consent for transplant listing.    Education provided by: Dr. Lynda Abad   Transplant Coordinator: Belkys Quintanilla RN   Transplant Physician: Dr. Abad     Signed listing informed consent received? YES  Patient agrees to be listed for the following:  KDPI > 85% [X]  Donors After Circulatory Death (DCD) [X]  Donors with a Positive Core Antibody for Hepatitis B [X]  Donors with Hepatitis C Virus to recipients with hepatitis C []  Donors with Hepatitis C Virus to Negative hepatitis C recipients []    Patient will be discussed at an upcoming selection committee to determine eligibility to be placed on the [facility] waiting list.

## 2023-10-10 NOTE — PROGRESS NOTES
Eval Clinic Note:  Patient attended evaluation appts on 10/10/23 with Dr. Oscar and Dr. Abad.  Medications and allergies reviewed with patient.  Patient presented to appointment with his wife.  Patient ambulated independently.    History:  Patient has a history of previous kidney transplant secondary to membranous nephropathy  Patient is pre dialysis at this time  Testing completed recently:   Testing needed for evaluation: CT cardiac score, CT scan of the chest, bilateral venous duplex of legs, ANTHONY/TBI, and cardiac risk start   Listing Consent: KDPI >85%, DCD, Hep B  Comments:  Provided patient with my contact info for questions and concerns.      Patient would like to have testing completed at [facility] if possible. SW appointment cancelled today to no provider in clinic.  Patient may be rescheduled for a virtual appointment since he has been through the process already.

## 2023-10-10 NOTE — PROGRESS NOTES
PRE-TRANSPLANT EDUCATION  Patient received education regarding the following topics as part of their pre-transplant evaluation:  The evaluation process, including:   Transplant team members and roles    Required consultations and testing   Selection criteria and suitability for transplant   Listing process and receiving an organ offer   Psychosocial and financial considerations for a successful transplant   Patient responsibilities, including the necessity of adhering to a strict medical regimen  An overview of the surgical procedure   Potential medical, surgical, and psychosocial risks to transplantation, including:   Wound infection   Pneumonia   Blood clot formation   Organ rejection, failure, and possibility of re-transplantation   Lifetime immunosuppression therapy and associated risks   Arrhythmias and cardiovascular collapse   Multi-organ system failure   Death   Depression   Post-Traumatic Stress Disorder   Generalized anxiety, issues of dependence, and feelings of guilt  Available alternatives to transplantation  Donor risk factors that could affect the success of the transplant and the health of the patient, including:   Donor age   Donor medical and social history   Condition of the organ   Risk of crystal cancer, HIV, Hepatitis B, Hepatitis C, or malaria if the infection is not detectable at the time of donation  Patient?s right to withdraw consent for transplantation at any time during the process  Transplants not performed in a Medicare-approved transplant center could affect the patient?s ability to have immunosuppression medication paid for under Medicare part B.   Multiple listing options.    Patient was given the opportunity to have questions answered. Patient was provided a copy of the informed consent for transplant evaluation.    Signed evaluation informed consent received? 10/10/2023

## 2023-10-11 PROBLEM — N18.30 CHRONIC KIDNEY DISEASE (CKD), STAGE III (MODERATE) (MULTI): Status: ACTIVE | Noted: 2023-01-01

## 2023-10-11 PROBLEM — N18.4 CKD (CHRONIC KIDNEY DISEASE) STAGE 4, GFR 15-29 ML/MIN (MULTI): Status: ACTIVE | Noted: 2023-01-01

## 2023-10-11 NOTE — PROGRESS NOTES
Subjective     He was having diarrhea  He feels very swollen  He feels still SOB  He was in [place] all day yesterday.    Patient ID: Garcia Drummond is a 63 y.o. male who presents for Follow-up (6 MONTH FUV- LAB REVIEW 10/10/2023).  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  In the past he was on peritoneal dialysis  His medications are reviewed  On mycophenolate tacrolimus and prednisone for immunosuppression  Review of Systems   Respiratory:  Positive for shortness of breath.    Cardiovascular:  Positive for leg swelling.   All other systems reviewed and are negative.      Objective   Physical Exam  Vitals reviewed.   Constitutional:       Appearance: Normal appearance.   HENT:      Head: Normocephalic and atraumatic.      Comments: Face is swollen     Right Ear: External ear normal.      Left Ear: External ear normal.      Nose: Nose normal.      Mouth/Throat:      Mouth: Mucous membranes are moist.      Pharynx: Oropharynx is clear.   Eyes:      Extraocular Movements: Extraocular movements intact.      Conjunctiva/sclera: Conjunctivae normal.      Pupils: Pupils are equal, round, and reactive to light.   Cardiovascular:      Rate and Rhythm: Normal rate and regular rhythm.   Pulmonary:      Effort: Pulmonary effort is normal.      Breath sounds: Normal breath sounds.   Abdominal:      General: Abdomen is flat.      Palpations: Abdomen is soft.      Comments: Centrally obese   Musculoskeletal:      Comments: Positive Bilateral edema   Skin:     General: Skin is  warm and dry.   Neurological:      General: No focal deficit present.      Mental Status: He is alert and oriented to person, place, and time.   Psychiatric:         Mood and Affect: Mood normal.         Behavior: Behavior normal.         Assessment/Plan   Problem List Items Addressed This Visit             ICD-10-CM    Diabetes type 2, controlled (CMS/[facility]) E11.9    History of kidney transplant Z94.0    Hyperlipidemia E78.5    Hypertension - Primary I10    AMPARO (obstructive sleep apnea) G47.33    Proteinuria R80.9    Hematuria R31.9    CKD (chronic kidney disease) stage 4, GFR 15-29 ml/min (CMS/[facility]) N18.4

## 2023-10-16 NOTE — PROGRESS NOTES
"Subjective   Garcia Drummond is a 63 y.o. male who presents for *** transplant evaluation visit. ***    Review of Systems     Objective   Physical Exam     Lab Review    Blood Type: No results found for: \"ABORH\"  Lab Results   Component Value Date    CREATININE 2.96 (H) 10/10/2023    K 4.8 2023    GLUCOSE 138 (H) 2023    HCT 29.1 (L) 10/10/2023    WBC 10.3 10/10/2023     10/10/2023    CALCIUM 8.6 2023       Cardiographics  ECG: {diagnostic ec}    Assessment/Plan    Diagnoses: No diagnosis found.  Garcia Drummond is a {MISC; TRANSPLANT CANDIDATE RANKIN} candidate for *** transplantation.       {MISC; TRANSPLANT COMMON EVAL RECOMMENDATIONS:531085}  Advantages and disadvantages of transplantation were reviewed. Discussed the operative procedure and potential complications, particularly in regard to the need for re-operation. I addressed issues of post-operative recovery and follow-up. Lastly, I discussed the need for immunosuppressive therapy and the risk associated with this treatment.  He appears to have {rankin} understanding of his medical condition and the transplant process.  "

## 2023-10-16 NOTE — PROGRESS NOTES
Subjective   Garcia Drummond is a 63 y.o. male who underwent *** on N/A. Here today for follow-up.    Review of Systems     Objective   The home BP readings have been in the ***'s to ***'s range.  Physical Exam  Lab Results   Component Value Date    CREATININE 2.96 (H) 10/10/2023    K 4.8 09/21/2023    GLUCOSE 138 (H) 09/21/2023    HCT 29.1 (L) 10/10/2023    WBC 10.3 10/10/2023     10/10/2023    CALCIUM 8.6 09/21/2023     Assessment/Plan   {FINDINGS; TRANSPLANT COMMON IMPRESSIONS:063861}    {MISC; TRANSPLANT COMMON EVAL RECOMMENDATIONS:989851}.  LISTING EDUCATION    Patient educated regarding the following prior to placement on the transplant waiting list:   The patient?s medical condition, prognosis, and treatment plan.   The expectations and patient responsibilities while on the waiting list, including:   Keeping the transplant center informed of any changes in contact information or insurance coverage   Notifying the transplant center of any changes in medical status   Required testing and/or re-evaluation appointments while awaiting transplant   An overview of the surgical procedure, including potential risks and alternatives.   Information regarding what to expect during the inpatient admission and recovery period.   A discussion regarding organ offers and types of potential donors, including potential risks that may be associated with specific types of donors that could affect the success of the transplant or the health of the patient.  The right to refuse transplantation.     Patient was given the opportunity to have questions answered. Patient was provided a copy of the informed consent for transplant listing.    Education provided by: Dr. Lynda Abad   Transplant Coordinator: Belkys Quintanilla RN   Transplant Physician: Dr. Abad     Signed listing informed consent received? YES  Patient agrees to be listed for the following:  KDPI > 85% [X]  Donors After Circulatory Death (DCD) [X]  Donors with a Positive  Core Antibody for Hepatitis B [X]  Donors with Hepatitis C Virus to recipients with hepatitis C []  Donors with Hepatitis C Virus to Negative hepatitis C recipients []    Patient will be discussed at an upcoming selection committee to determine eligibility to be placed on the [facility] waiting list.

## 2023-10-19 NOTE — PROGRESS NOTES
"  ENCOUNTER    Visit Type Initial Visit  Location: Telephone    Barriers to Communication / Understanding:   [] Language [] Vision [] Hearing [] Other      []  Present     Accompanied By: Wife, Yasmin was present during the phone call.    Organ For Transplant:  Kidney    Ethnicity:  White    ADLs Fully Independent      Instrumental ADLs Fully Independent      Level of Activity Sedentary      DME     Knowledge of Health Good  Pt reported he was diagnosed with hypertension and diagnosed with \"something in his cheek that was cancer related.\"    Why Do You Have End Stage Organ Disease   Pt stated, \"rejection.\"   Knowledge of Transplant / VAD:  Yes Patient Is Able To Make An Informed Decision    Patient Understands the Risks of Transplant / VAD  Yes Rejection  Yes Infection Yes Complications  Yes Death      Patient Understands Recovery and Follow-Up from Transplant / VAD  Yes Length Of State Yes Appointments  Yes Labs  Yes Rehabilitation      Patient Has Identified Goals of Transplant / VAD Yes  Pt shared he wants to stay off dialysis and have a better lifestyle.    Any Potential Donors?     Overall Compliance  good     Pt shared taking 11 or 12 medications  Compliance With Medications good    Managed By Patient Pt reported using a pill box     Understanding Of Medication  good  Compliance With Appointments good    How Does Patient Handle Health Problems      Organ  Kidney    IOP:    Fluid Restriction:   No [] Compliant     Medical And Clinic Appointment Compliant     Dialysis: Pt is pre-dialysis  [] What Dialysis Center  [] Began       [] In Center [] Home Hemo [] Peritoneal       Attendance:  Treatment Attendance  Treatment Time     [] Shortened Treatments [] Rescheduled Treatments [] Missed Treatments       Fluids:    No Does Patient Still Urinate  [] Fluid Restriction [] IDWG [] EDW  Achieved Dry Weight        Diet:   Patient is compliant with renal restrictions     Patient is compliant with low " "sodium diet      Labs:    [] Patient Reported [] Collateral       []  Potassium [] Albumin [] Phosphorus      # of Binders:  [] # of Binders per meal [] Meals per day      []  # of Binders per Snack [] Snacks per day [] Phosphorus     Pancreas:  [] Checks blood sugar      times/day [] A1c   Hypoglycemic Episodes  Unawareness  Outside Interventions    Liver:  Is Lactulose prescribed  Dose:   Timesper day:  Is patient compliant       SOCIAL HISTORY  No   ?  No    Level of Education: 11th grade in high school     Literate yes   Computer literate Yes  Internet access Yes       Sources of Income: Pt shared his wife Yasmin receives SSI  Patient's Current Employment: Pt shared he is not working and is receiving disability.     [] Full-Time [] Part-Time [] Unemployed [] Retired     []  None [] Not working by choice [x] Not working disabled   $1,368/month      [] Short Term Leave   [] Other   Employment History     Will patient have paid status from employment during recovery       Spouse / SO Current Employment     Will spouse / SO have paid status from employment during recovery   None    Other Sources of Income       Does patient have financial concerns Yes     Is patient able to meet current monthly expenses Yes    Resources:      Patient was provided information on transplant fundraising No      Insurance:  Primary Insurance Medicare [employer]     Secondary Insurance     Prescription Coverage     Medicare coverage due to     Medicare Part A  Effective date    Medicare Part B  Effective date    Medicare Part C  Deductable  Out of Pocket Max    Medicare Part D  Extra Help?    Medicaid  Waiver  Redetermination Date    HMO       FAMILY SYSTEM    Single No   Yes How long  39 years    Describe Relationship: Pt stated, \"Happy and good\"   No How long   Describe Relationship   No When   No When  In a Relationship No  How Long  Describe Relationship    Spouse / SO Name Yasmin  " "Age 67  Health   Good   Other Caregiver Responsibilities  No   Spouse / Significant others reaction to donation    Children:  Yes # Biological (1 daughter) not close    # Adopted   Yes # Step Children (1 step-daughter, 2 step-sons) \"I'm close with two of them.\"         Step Child #1 Name  Kelly   Age  53  Health  \"Good\"     Lives Local  How Much Contact Daily      Step Child #2 Name  Jorge Alberto    Age 52  Health  \"Fine\"     Lives Local  How Much Contact Weekly    Parents:  Raised By One Biological Parent Pt reported he was raised by his mother.     Did the patient have contact with the other parent Yes    Mother  Yes Age 79   Cause of Death Heart failure   Father  Yes Age 69  Cause of Death Complications from hip replacement     Living Parent #1  Living Parent #2    Additional Information    Siblings:  [x] # Biological  (2 siblings- both ) [] # Half Siblings [] # Step Siblings       Support & Recovery Plan:  Yes Adequate    Primary Support:  Mary Hoffman  Phone [phone]  Age 67  Relationship to Patient Wife  If employed, can they take time off work Yes, Yasmin is not working   If so, is it paid time off    If not, will this impact your finances No   Did they attend education classes Yes   Do they have other caregiver responsibilities (child or eldercare) No   Do they have their own conditions which may prevent them from providing care for you No  (Medical, psychological, physical limitations)  Are they available on short notice Yes   Are they reliable Yes   Are they responsible Yes   Are they able to understand and process new information  Yes  Do they have reliable transportation or will you allow them to use your vehicle Yes   Are they currently involved in your care Yes   Comments    Secondary Support:  Mary Mendoza  Phone [phone] Age 51  Relationship to Patient Step Daughter   If employed, can they take time off work Yes   If so, is it paid time off Yes   If not, will this impact your " "finances No   Did they attend education classes No   Do they have other caregiver responsibilities (child or eldercare) No   Do they have their own conditions which may prevent them from providing care for you No  (Medical, psychological, physical limitations)    Are they available on short notice Yes   Are they reliable Yes   Are they responsible Yes   Are they able to understand and process new information  Yes  Do they have reliable transportation or will you allow them to use your vehicle Yes   Are they currently involved in your care Yes   Comments    Alternate Support  Alternate Support    Housing:  Yes Adequate Owns home  Type of Home Trailer 4 stairs to get into the home  Distance to [facility] 2 hours  Pets No  Does Patient Feel Safe in Home Yes      Transportation:  Yes Adequate  # Licensed Drivers in the Home 2  Does Patient Drive Yes   # Reliable Vehicles 2  Does Patient use Public Transportation No  Does Patient use Medical Transportation No  Comments    MENTAL HEALTH      Cognition:  No impairment observed / reported    The patient reports their mood as \"cranky, tired.\" Pt reported that going through the transplant process again is the reason why he has been feeling this way.      Reported Mental Health Diagnosis  Denied  Family History of Mental Health Concerns Denied   What are patient psychosocial stressors: Transplant, finances          Current Medications:  Yes  Mental Health Meds  no Rx'd by   Sleep Meds Xanax \"I take it rarely\"   Rx'd by PCP  Pain Meds  no Rx'd by     OTC Meds : Vitamins   Past Medications     Counseling   Pt reported he has never been to counseling before.     Has patient ever been hospitalized for mental health reasons No   Was the hospitalization voluntary  Duration   Where    When  Describe situation    Discharge Plan for Follow Up  Was Discharge Plan Completed   Referral to Transplant Psych   Mental Health Follow Up Required      Suicide Assessment:  History of Suicide Ideation " "No  [] Timeframe [] Frequency   Frequency   Plan Created  Intent to Follow Through  Outcome      History of Suicide Attempt No     History of Suicidal Ideation in the past 3 months No  Intensity   Duration     Description of Plan      Plans thought of  Intent to Follow Through  Highest Level of Intent to Follow Through    Current Plan for Safety    Plan for Follow-Up    Patient's Reported Trauma History    What are patient's coping behaviors: \"travel, go on computer, watch tv, and hart.\"    Denominational / Spirituality: No     Attitude toward interviewer Cooperative and Appropriate    Eye Contact : N/A, assessment completed via phone call.     Appearance: N/A    Affect Appropriate    Thought Process Appropriate    Substance Use /Abuse History:    Current Tobacco User No  Patient uses   Tobacco Frequency   For How Long  Is Patient Required to Quit     Former Tobacco User Yes  Describe past tobacco use and date quit- Pt reported that he smoked a pack of cigarettes a day for 50 years. Pt reported that he quit for 3-4 years and started again and quit a second time a little over a year ago.     Current Alcohol User No  Type of Alcohol Used   Amount  Frequency   Pattern of Alcohol Use    Is Patient Required to Quit   Continued to use the substance despite being told the substance is affecting their health    History of problems at work, school or home due to substance use      Former Alcohol User Yes   Describe past alcohol use and date quit- Pt reported that he would drink alcohol everyday in his 20's and 30's. Pt reported that he would drink about 5 beers in a sitting. Pt reported that the last time he drank was [place].        Has patient ever gone to CD treatment No  If yes, When, Where and What type of Program  Attends AA meetings    Sponsor  Do support people drink alcohol No  If yes, describe support people's use  Is alcohol kept in the home No  Does Patient need to sign a CD contract No    Current Illegal / " Unprescribed Drug User No  Type of Illegal Drug Used   Frequency  Pattern of Drug Use    Is Patient Required to Quit     Illegal / Unprescribed Drug #2  Type of Illegal Drug Used   Frequency  Pattern of Drug Use      Continue to use the substance despite being told the substance is affecting their health    History of problems at work, school or home due to substance use      Former Illegal / Unprescribed Drug User Yes  Describe past illegal drug use and date quit- Pt reported he used to smoke marijuana daily for 20-30 years. Pt shared he stopped smoking marijuana 20 years ago. Pt reported that he used to do a quarter gram of cocaine once/twice a month. Pt shared the last time he did coke was 20 years ago.   Has patient ever gone to CD treatment No  If yes, When, Where and What type of Program   Attends AA/NA  meetings    Is patient on a Methadone / Suboxone regiment No  Do support people use illegal drugs No  If yes, describe support people's use  Are illegal drugs kept in the home No  Does Patient need to sign a CD contract No    Illegal / Unprescribed Drug #2  Type of Illegal Drug Used   Frequency  Pattern of Drug Use    Prescription Drug Abuse:  No Has patient experienced feelings of addiction  No Has patient experienced symptoms of withdrawal  No Has patient experienced any side effects? e.g.  hallucinations or delusions    Does Patient Meet the Criteria for Alcohol Use Disorder No Diagnosis  Does Patient Meet [facility] guidelines Yes  Does Patient Meet the Criteria for Illegal Drug Use Disorder No Diagnosis  Does Patient Meet [facility] guidelines Yes    [facility] Substance Relapse Risk Factors   DSM-5 Severity Factors:     IOP     LEGAL ISSUES  No Arrests  No Currently probation or parole No   FCI No  When   How long   Where       Citizenship:  Yes [place] Citizen  No Green Card No Visa    Advance Directives: Yes: Yasmin HERNANDEZ Requested Copy  Requested Copy    Guardian:    OLIVE Zelaya: ANDRES spoke with Pt and  Pt's wife, Yasmin over the phone for initial psychosocial assessment. Pt and Pt's wife were both pleasant and engaged. Pt reported his insurance is Medicare [employer]. Pt showed a good understanding of the risks of transplant and what post-transplant recovery looks like. Pt reported that his wife receives SSI and Pt receives $1,368.00/month in disability. Pt shared the cause of his kidney disease is due to rejection from his last transplant. Pt reported his goals of transplant are to stay off of dialysis and to have a better lifestyle. Pt reported compliance with medications and medical appointments. Pt is pre-dialysis. Pt listed his wife, Yasmin as primary support ([phone]). Pt listed his step-daughter, Kelly as secondary support ([phone]). Both supports are reliable, have no other caregiver responsibilities, and have working transportation. Pt stated his mood has been “cranky and tired.” Pt shared this is due to having to go through the transplant process again. Pt reported that he takes Xanax prescribed by his PCP occasionally to help with his sleep. Pt denied any current/past MH medications or diagnosis. Pt was not administered PHQ-9 or YUE-7 due to telephone visit. Pt denied any current tobacco use. Pt shared that he used to smoke 1 PPD for 50 years. Pt shared that he quit for 3-4 years during that time and quit for a second time a little over a year ago. Pt denied any current alcohol use. Pt reported that he used to drink heavily. Pt shared he was consuming about 5 beers per sitting, every day during his 20's and 30's, Pt reported the last time he drank was on [place]. ANDRES to administer documents to Pt during next in-person visit. SW provided information on transplant house. Pt is low-moderate psychosocial risk due to past illicit drug and tobacco use. ANDRES would consider listing Pt once Pt is seen in-person by ANDRES and has completed documents.       PLAN  Plan: SW to meet with Pt and Pt's primary  support, Yasmin in-person in a month. SW to administer PHQ-9, YUE-7, and to complete SIPAT. SW to call and confirm secondary support over the phone.

## 2023-10-23 NOTE — PROGRESS NOTES
Spoke to pt, let him know that we needed to get him a cardiology appt - his calcium score was high.  He is aware they will be contacting him.

## 2023-10-25 PROBLEM — R07.9 CHEST PAIN: Status: ACTIVE | Noted: 2023-01-01

## 2023-10-25 PROBLEM — Z01.818 PRE-TRANSPLANT EVALUATION FOR CHRONIC KIDNEY DISEASE: Status: ACTIVE | Noted: 2023-01-01

## 2023-10-25 PROBLEM — R06.09 DOE (DYSPNEA ON EXERTION): Status: ACTIVE | Noted: 2023-01-01

## 2023-10-25 PROBLEM — I25.10 CORONARY ARTERY DISEASE INVOLVING NATIVE CORONARY ARTERY OF NATIVE HEART: Status: ACTIVE | Noted: 2023-01-01

## 2023-10-25 NOTE — PROGRESS NOTES
ANDRES met with Pt and Pt's primary support/wife, Yasmin for a follow-up visit. Pt and Pt's wife were both pleasant and engaged. Pt's wife drives and has reliable transportation, has no other caregiver responsibilities, and is dependable. ANDRES administered documents. Pt scored a 10 on the PHQ-9, indicating moderate clinical depression. Pt scored a 7 on the YUE-7, indicating mild clinical anxiety. Pt scored a 15 on the SIPAT, indicating Pt is good candidate for transplant. ANDRES recommends to list Pt for transplant.     Plan: ANDRES will follow up with Pt annually.

## 2023-10-25 NOTE — PROGRESS NOTES
Chief Complaint  Patient referred by Dr. Kyle Stevens for Referral - Kidney Txp cardiac evaluation.    History Of Present Illness:    Garcia Drummond is a 63 y.o. male, with history significant for ESRD, s/p LDKT 2018, chronic AMR with G4 CKD, proteinuria, anemia, DM2, HTN, hypertensive heart disease, HLD, CAD (calcium score 1086 AU), extensive LE DVT with heterozygous Factor V Leiden mutation, prior smoking, COPD, hypothyroidism, and BMI 39, who visits Cardiology today as a new patient  for pre-transplant evaluation and severe CAD. He was recently scanned with a Calcium score of 1086 AU, 3 vessels but mainly in the RCA, with a large calcium plaque in the ostium. He is anticoagulated for his extensive DVT and on low dose aspirin. His HLP is been treated with Lipitor 40mg and his LDL is being consistently <55 with TG ~300.  Patient refers persistent pressure-like chest pain, dyspnea on exertion, lower extremity edema, and intermittent lightheadedness. He denies resting shortness of breath, orthopnea, PND, nocturia, edema, palpitations, dizziness, syncope, or claudication.    Today's ECG shows sinus rhythm at 80 bpm, normal AV conduction and diffuse ventricular repolarization abnormalities.    Past Medical History:  DM2, HTN, hypertensive heart disease, mixed HLD, CAD (calcium score 1086 AU), prior smoking, hypothyroidism, ESRD previously on dialysis s/p renal transplantation 2018, BMI 39    Past Surgical History:  Live donor renal transplant, peritoneal dialysis catheter placement with subsequent removal, bilateral cataract extraction and lens implantation, appendectomy, remote tonsillectomy, bowel resection, and umbilical hernia with mesh.     Social History:  He reports that he has quit smoking. His smoking use included cigarettes. He has a 30.00 pack-year smoking history. He has never used smokeless tobacco. He reports current alcohol use. He reports that he does not use drugs.    Family History  Mother  with a  history of heart failure and A.fib  Father  with a history of heart failure  One brother alive with a recent stroke  One brother  from internal bleeding complications after undergoing an EGD  No biological children  No family history of clotting or bleeding disorders     Allergies:  Corticotropin, Empagliflozin, and Iodinated contrast media    Outpatient Medications:  Current Outpatient Medications   Medication Instructions    albuterol 90 mcg/actuation inhaler inhalation, Every 6 hours PRN    allopurinol (Zyloprim) 100 mg tablet 1 half tablet, oral, Daily    ALPRAZolam (Xanax) 0.5 mg tablet oral    aspirin 81 mg chewable tablet oral    atorvastatin (Lipitor) 40 mg tablet 1 tablet, oral, Daily    blood sugar diagnostic (Accu-Chek SmartView Test Strip) strip USE 3 TIMES DAILY    Breztri Aerosphere 160-9-4.8 mcg/actuation HFA aerosol inhaler     bumetanide (BUMEX) 1 mg, oral, Daily    calcium 500 mg calcium (1,250 mg) tablet 1 tablet, oral, Daily    carvedilol (Coreg) 12.5 mg tablet TAKE 1 TABLET BY MOUTH TWICE  DAILY    cyanocobalamin (VITAMIN B-12) 1,000 mcg, oral, Daily, as directed    Eliquis 5 mg tablet 1 tablet, oral, 2 times daily    fish oil concentrate (Omega-3) 120-180 mg capsule oral    HumaLOG KwikPen Insulin 100 unit/mL injection INJECT SUBCUTANEOUSLY 20  UNITS BEFORE MEALS    insulin degludec (TRESIBA FLEXTOUCH U-100) 84 Units, subcutaneous, Nightly, Take as directed per insulin instructions.    insulin degludec (TRESIBA FLEXTOUCH U-200) 84 Units, subcutaneous, Nightly    levocetirizine (Xyzal) 5 mg tablet 1 tablet, oral, Every evening    levothyroxine (SYNTHROID, LEVOXYL) 150 mcg, oral, Daily    magnesium gluconate (Magonate) 27.5 mg magne- sium (500 mg) tablet 1 tablet, oral, Daily    mometasone (Nasonex) 50 mcg/actuation nasal spray 1 spray, nasal, 2 times daily    multivitamin tablet 1 tablet, oral, Daily    mycophenolate (Cellcept) 250 mg capsule oral    NIFEdipine CC 30 mg 24 hr tablet 1  tablet, oral, Daily    omeprazole (PriLOSEC) 20 mg DR capsule 1 capsule, oral, Daily    predniSONE (Deltasone) 5 mg tablet 1 tablet, oral, Daily    tacrolimus (Prograf) 0.5 mg capsule oral    tacrolimus (Prograf) 1 mg capsule 2 capsules, oral, Every 12 hours    tamsulosin (Flomax) 0.4 mg 24 hr capsule TAKE 1 CAPSULE BY MOUTH  DAILY     Last Recorded Vitals:  Vitals:    10/25/23 0834 10/25/23 0835   BP: 129/75 133/77   BP Location: Left arm Right arm   Patient Position: Sitting Sitting   Pulse: 80    Resp: 18    SpO2: 95%    Weight: 130 kg (287 lb 9.6 oz)    Height: 1.829 m (6')      Physical Exam:  General: Sitting up comfortably in chair; in no apparent distress.  HEENT: Normocephalic; atraumatic. Well hydrated. Pale.  Eyes: Anicteric sclera. Extraocular movement intact.  Neck: Supple; no thyromegaly; normal jugular venous pressure, no bruits.  Respiratory: Bilateral air entry equal. No wheezing.  Cardiovascular: Normal S1, S2; no murmurs auscultated.  Abdomen: Nondistended; nontender. (+) bowel sounds.  Extremities: 1+ peripheral edema present. Pulses 2+ diffusely.  Neurological: Oriented to time, place, and person; nonfocal.  Psychiatric: Normal affect.     Last Labs Reviewed:  CBC -  Lab Results   Component Value Date    WBC 10.3 10/10/2023    HGB 8.8 (L) 10/10/2023    HCT 29.1 (L) 10/10/2023    MCV 92 10/10/2023     10/10/2023     CMP -  Lab Results   Component Value Date    CALCIUM 8.6 09/21/2023    PHOS 2.7 10/10/2023    PROT 5.8 (L) 10/10/2023    ALBUMIN 3.7 10/10/2023    AST 15 10/10/2023    ALT 10 10/10/2023    ALKPHOS 60 10/10/2023    BILITOT 0.3 10/10/2023     LIPID PANEL -   Lab Results   Component Value Date    CHOL 130 10/19/2022    TRIG 306 (H) 10/19/2022    HDL 36.0 (A) 10/19/2022    CHHDL 3.6 10/19/2022    LDLF 33 10/19/2022    VLDL 61 (H) 10/19/2022    NHDL 94 10/19/2022     RENAL FUNCTION PANEL -   Lab Results   Component Value Date    GLUCOSE 138 (H) 09/21/2023     09/21/2023    K  4.8 2023     (H) 2023    CO2 16 (L) 2023    ANIONGAP 15 2023    BUN 49 (H) 10/10/2023    CREATININE 2.96 (H) 10/10/2023    GFRMALE 14 (A) 2023    CALCIUM 8.6 2023    PHOS 2.7 10/10/2023    ALBUMIN 3.7 10/10/2023      Lab Results   Component Value Date     (H) 2023    HGBA1C 7.7 (A) 2023     Last Cardiology/Imaging Tests Reviewed:  EC/15/2023: sinus tachycardia 113 bpm with normal AV conduction. Incomplete RBBB. Diffuse repolarization abnormalities with non-significant ST depression.    Echocardiogram:  2023    Cath:  No results found for this or any previous visit from the past 1095 days.    Stress Test:  No results found for this or any previous visit from the past 1095 days.    Cardiac CT:  CT cardiac scoring wo IV contrast 10/19/2023:    Other CT:  Abdominal CT 2023: calcified plaques in the abdominal and pelvic arteries.    Assessment/Plan   63 y.o. male, with extensive medical history including ESRD, s/p LDKT 2018, chronic AMR with G4 CKD, anemia, T2DM, HTN, hypertensive heart disease, HLD, CAD (calcium score 1086 AU), prior extensive LE DVT with heterozygous Factor V Leiden mutation, prior smoking, and COPD who comes for Cardiac evaluation for kidney retransplantion. He is symptomatic from the cardiovascular standpoint, including SERRANO and atypical chest pain in a diabetic patient and his calcium score is severely elevated.     Recommendations:  - Diagnostic LHC  - Contrast allergy pretreatment  - IV hydration precath    Will discuss with Nephrology treating team to plan precath interventions.    Raymond Hardin MD

## 2023-10-25 NOTE — PATIENT INSTRUCTIONS
Dear Garcia Drummond,    It was a pleasure meeting you today at the Cardiology office. As we dicussed, your studies have shown that you have significant calcium in your coronary arteries which require additional studying with a diagnostic left heart catheterization. I will contact your Nephrology and Transplant team to share my assessment conclusions and plan. I will contact you once the whole team agrees on your coronary study.    Sincerely,    Raymond Hardin MD

## 2023-11-01 PROBLEM — Z01.818 PRE-OP EVALUATION: Status: ACTIVE | Noted: 2023-01-01

## 2023-11-02 NOTE — PROGRESS NOTES
Patient ID: Wilver Drummond is a 63 y.o. male.    Primary Care Provider: Sabrina Coto DO  Visit Type:  Follow Up     Verbal consent was requested and obtained from patient on this date for a telehealth visit.      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 Patient presented to the hospital in march 2020 with right leg pain and swelling for the last few days and he was found to have extensive acute DVT of the right lower extremity from the common femoral vein extending down. He complained of some shortness of breath and his V/Q scan was low probability for PE. To note his transplanted kidney is in the left iliac fossa and there was no DVT in the left CFV. He was started on Eliquis and he tolerating well currently without bleeding events, but continued to have RLE pain especially on walking. He had no fever or chest pain, no nausea, vomiting, diarrhea. 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     he had an US done recently on 6/11 with PCP that showed chronic venous changes at the site of his prior clot    Interval History - Telehealth Visit due to COVID19 national emergency and related precautions - 11/2/23    Currently being evaluated for kidney transplant, follows with Transport Team    Energy could be better, but able to complete ADLs  SOB with any activity-   Urinating frequently, but has a weak stream  No hematuria     Appetite is good, eating and drinking well  No fevers or  infections  Non-productive dry cough  Bruises easily, No major bleeding events   Chronic Nausea, no vomiting  Bilateral LLE swelling ig standing for long period of time, resolves with sitting   Diarrhea for the past 3 months, schedule to see GI  Abdominal pain   Occasional cramping in bilateral feet    On Eliquis and tolerating well   No nausea, vomiting  he remains on immunosuppressant including prednisone and tacrolimus        Objective    BSA: There is no height or weight on file to calculate BSA.  There were no vitals taken for this visit.     Physical Exam  Constitutional:       Appearance: Normal appearance.   HENT:      Head: Normocephalic and atraumatic.   Pulmonary:      Effort: Pulmonary effort is normal.      Breath sounds: Normal breath sounds.   Musculoskeletal:      Cervical back: Normal range of motion and neck supple.   Neurological:      General: No focal deficit present.      Mental Status: He is alert and oriented to person, place, and time.   Psychiatric:         Mood and Affect: Mood normal.         Behavior: Behavior normal.         Thought Content: Thought content normal.         Judgment: Judgment normal.         Performance Status:  Symptomatic; fully ambulatory      Assessment/Plan      Assessment:    1. Extensive acute Right LE DVT in patient with kidney transplant in Left iliac fossa- FVL heterozygous    unprovoked DVT, patients with renal transplant are at higher risk of DVT   also patient with heterozygous FVL, discuss significance and the increased risk of first thrombosis around 4 fold higher than general population with this but only slightly elevated risk of recurrence (around 2 folds)     regardless due to unprovoked nature of his clot recommended indefinite anticoagulation   today we reiterated the recommendation, we discussed that having the chronic clots or repeat US is not the main determining factor into long term AC neither the heterozygous FVL mutation, rather the unprovoked  nature will put him at high risk of recurrence     but we certainly would take his financial toxicity into consideration, will refer him to AC clinic to discuss financial assistance application or perhaps switch to xarelto if it would be more affordable and to coumadin as a last resort      we also discussed that lower dose of Eliquis at 2.5 mg twice a day can be considered now that he has completed > 12 m , for now continue Eliquis 5 mg twice a day , follow up anticoagulation clinic    8/10/22-on Eliquis 5 mg twice a day, but he ran out recently   will contact anticoagulation clinic to renew and inquire about the financial assistance and medication refill   he was not interested in going back to warfarin   plan to continue with same for now , continue to follow his transplant doctor's for his rejection     2/9/23- continues to tolerate anticoagulation well   no new bleeding or clotting events  at this his kidney function worsened slightly and we discussed reducing dose of Eliquis to 2.5 mg twice a day   he agrees and will make the change   continue indefinitely as long as tolerated     8/3/23- patient discontinued eliquis approximately 4 months ago for reasons of cost. Discussed with patient the importance of taking medication due to multiple contributing risk factors such as renal transplant, prior DVT hx, heterozygous FVL, etc. Discussed trialing alternate doac, however, due to renal insufficiency would recommend continuation on Eliquis 2.5mg. Patient is agreeable to restart. E-prescribed Eliquis 2.5mg twice daily. Will refer patient to the anticoag. clinic for financial assistance. Will continue to monitor pt closely.     11/2/23  Patient is doing well considering. He is currently under evaluation for a kidney transplant.  Denies any new clotting concerns. No abnormal bleeding, remains to bruise easy. SOB with any activity. Diarrhea for the past several months, denies any melena, or blood, is scheduled to see GI  next week. Magnesium is low at 1.41, possible secondary to chronic diarrhea. Discussed increasing magnesium by drinking a sports drink or a Pedialyte for replacement.    Remains on indefinite Eliquis 2.5 mg, tolerating with no issues. Discussed in the event of surgery will need to discontinue Eliquis for 48-72 hours prior. Patient may restart anticoagulation as the discretion of the surgeon.       RTC 6 months with D-dimer recheck                           JOHNNY De Paz-CNP

## 2023-11-02 NOTE — PATIENT INSTRUCTIONS
Continue on Eliquis 2.5 mg twice daily  Follow with GI for Chronic Diarrhea  Advised to drink sport beverage that contains electrolytes   Follow-up with labs in 6 months with labs (CBC, CMP, D-dimer)- labs at the [facility]- no fasting  Phone visit with Swati Rowe CNP on 6/3/24 at 1100

## 2023-11-09 PROBLEM — R19.7 DIARRHEA: Status: ACTIVE | Noted: 2023-01-01

## 2023-11-09 NOTE — PROGRESS NOTES
Subjective   Patient ID: Garcia Drummond is a 63 y.o. male who presents for Diarrhea (Patient complains of diarrhea for the past three months, but it has subsided. ).  JEREMY Blanco is a pleasant 63-year-old male who underwent colonoscopy 4 years ago for screening.  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  He began diarrhea back in July.  Averaging 6 bowel movements daily with urgency no nocturnal bowel movements.  He did go to the emergency room because of symptoms worsen and stool cultures including C. difficile were negative at that time.  He begin to monitor his diet and could find no triggers for his diarrhea and his diet.  He denies any rectal bleeding during these episodes and has had no weight loss.    He took an Imodium 5 days ago and his diarrhea has not returned since.    A CT scan of his abdomen was performed 6 weeks ago revealing very subtle findings of possible early proximal sigmoid diverticulitis.  He is having no abdominal pain and continues to have no abdominal pain at this time.    Review of Systems   Constitutional: Negative.    HENT: Negative.     Eyes: Negative.    Respiratory: Negative.     Cardiovascular: Negative.    Gastrointestinal:  Positive for diarrhea.   Endocrine: Negative.    Genitourinary: Negative.    Musculoskeletal: Negative.    Neurological: Negative.    Hematological: Negative.    Psychiatric/Behavioral: Negative.         Objective   Physical Exam  Vitals and nursing note reviewed.   Constitutional:       Appearance: Normal appearance.   HENT:      Head: Normocephalic.      Mouth/Throat:      Mouth: Mucous membranes are moist.      Pharynx: Oropharynx is clear.   Eyes:      Pupils: Pupils are equal, round, and reactive to light.   Cardiovascular:      Rate and Rhythm: Normal rate and regular rhythm.   Pulmonary:      Effort: Pulmonary effort is normal.      Breath sounds: Normal breath sounds.    Abdominal:      General: Abdomen is flat. Bowel sounds are normal.      Palpations: Abdomen is soft.   Musculoskeletal:         General: Normal range of motion.      Cervical back: Normal range of motion and neck supple.   Skin:     General: Skin is warm and dry.   Neurological:      General: No focal deficit present.      Mental Status: He is alert and oriented to person, place, and time.   Psychiatric:         Mood and Affect: Mood normal.         Behavior: Behavior normal.         Assessment/Plan   Problem List Items Addressed This Visit             ICD-10-CM    History of colon polyps Z86.010    Diarrhea - Primary R19.7        Patient predominant diarrhea for 3 months now resolved stool cultures negative.  1 dose of Imodium 5 days ago has made his bowels back to normal.    Suspect functional diarrhea at this point advised him to monitor to see if his diarrhea comes back should come back of recommend checking stool for ova and parasites if negative and diarrhea persist then proceed with colonoscopy.

## 2023-11-13 NOTE — ASSESSMENT & PLAN NOTE
Use Bumex only as needed  Drop Cellctp to 500 BID with Diarrhea   Recheck Tac.  On1 mg BID  Recheck labs in 3 months  Watch for any symptoms of renal failure.

## 2023-11-13 NOTE — PROGRESS NOTES
Subjective   He is feeling well  Has a heart acath on Thursday  Nol swelling  Down to Tac 1 mg BID  Still having diarrhea off andon  On 1000 mg of Cellcept BID    Patient ID: Garcia Drummond is a 63 y.o. male who presents for Chronic Kidney Disease (1 MONTH FU).  HPI  He is here for follow-up visit  He is here secondary to kidney dysfunction with a renal transplant in the past  Blood work was drawn on October 30  Glucose 158 potassium 5.1 chloride 112 bicarb 22 BUN 79 with a creatinine of 2.95 estimated GFR is 23 albumin 3.8 total protein 5.9  Magnesium is 1.4 phosphorus 4 tacrolimus level is 11.9 hemoglobin is 8.6 UA shows positive protein urine albumin is 1668  Blood pressure here in the office today is 138/76  Medications are reviewed he is on allopurinol aspirin atorvastatin Bumex carvedilol Eliquis fish oil insulin magnesium mycophenolate nifedipine a PPI prednisone tacrolimus Flomax  Review of Systems   All other systems reviewed and are negative.      Objective   Physical Exam  Constitutional:       Appearance: Normal appearance.   HENT:      Head: Normocephalic and atraumatic.      Right Ear: External ear normal.      Left Ear: External ear normal.      Nose: Nose normal.      Mouth/Throat:      Mouth: Mucous membranes are moist.      Pharynx: Oropharynx is clear.   Eyes:      Extraocular Movements: Extraocular movements intact.      Conjunctiva/sclera: Conjunctivae normal.      Pupils: Pupils are equal, round, and reactive to light.   Cardiovascular:      Rate and Rhythm: Normal rate and regular rhythm.   Pulmonary:      Effort: Pulmonary effort is normal.      Breath sounds: Normal breath sounds.   Abdominal:      General: Abdomen is flat.      Palpations: Abdomen is soft.   Skin:     General: Skin is warm and dry.   Neurological:      General: No focal deficit present.      Mental Status: He is alert and oriented to person, place, and time.   Psychiatric:         Mood and Affect: Mood normal.          Behavior: Behavior normal.         Assessment/Plan   Problem List Items Addressed This Visit             ICD-10-CM    Diabetes type 2, controlled (CMS/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  Has anemia of CD  Will work on getting MONA.  HGB < 9         CKD (chronic kidney disease) stage 4, GFR 15-29 ml/min (CMS/[facility]) - Primary N18.4     - Renal Transplant July 25, 2018  - Acute Rejection treated with IVIG and Steroids June 2021  - Membranous Glomerulonephropathy  - Immunocompromised State  - HTN  - DM II on Insulin  - Hypothyroidism  - Obesity  - Nicotine use   - CKD with Creat ranging 1.5-1.7 in past but now creatinine is elevated  - HLD with Hypertriglyceridemia.   - DVT and has now stopped his Eliquis  - Proteinuria  - Hyperuricemia

## 2023-11-16 NOTE — POST-PROCEDURE NOTE
Physician Transition of Care Summary  Invasive Cardiovascular Lab    Procedure Date: 11/16/2023  Attending:    Satya Davison - Primary  Resident/Fellow/Other Assistant: Surgeon(s) and Role:    Indications:   Pre-op Diagnosis     * Pre-op evaluation [Z01.818]    Post-procedure diagnosis:   Post-op Diagnosis     * Pre-op evaluation [Z01.818]    Procedure(s):   Left Heart Cath  87226 - ID CATH PLMT L HRT & ARTS W/NJX & ANGIO IMG S&I    Procedure Findings:   Mild main vessel CAD and severe branch vessel [proximal OM1 with 80-90% stenosis] CAD in a right-dominant system.    Access of the Procedure:   -Right ulnar artery closed with TR band.   -I was able to access the right radial artery with great blood return but could not pass the wire through as the artery is severely diseased on ultrasound.    Complications:   None.     Estimated Blood Loss:   5 mL    Anesthesia: Moderate Sedation Anesthesia Staff: No anesthesia staff entered.    Any Specimen(s) Removed:   No specimens collected during this procedure.    Disposition:   -TR band per protocol.   -Home today.   -Resume Eliquis tomorrow   -No indication for PCI. Recommend continuing ASA 81, Statin, and beta blocker perioperatively.  -Further management as per Dr. Hardin.      Electronically signed by: Jordy Davison MD, 11/16/2023 3:01 PM

## 2023-11-16 NOTE — Clinical Note
The right ulnar pulse is 1+. Comment: Per patient, s/p radiation. Will request records. Render Risk Assessment In Note?: no Detail Level: Simple

## 2023-11-16 NOTE — Clinical Note
Multiple views of the left anterior descending obtained using power injection. Rate = 4 mL/sec. Total volume = 10 mL.

## 2023-11-16 NOTE — DISCHARGE INSTRUCTIONS

## 2023-11-28 PROBLEM — N17.9 AKI (ACUTE KIDNEY INJURY) (CMS-HCC): Status: ACTIVE | Noted: 2023-01-01

## 2023-11-28 NOTE — ED PROVIDER NOTES
Shortness of breath.  This 63-year-old white male with history of renal disease and previous renal transplant presents to the ED with complaint of increasing shortness of breath that began approximately 3 days ago he does admit to cough occasionally productive of clearish sputum.  He does admit to increased swelling of his lower extremities and weight gain.  Patient admits to undergoing testing for another renal transplant and states that a week before Thanksgiving he had a heart catheterization performed that was unremarkable.  States that his shortness of breath gets worse with activity.  Patient sees Dr. Selby for his renal disease.      History provided by:  Patient and spouse   used: No         Physical Exam  Vitals and nursing note reviewed.   Constitutional:       General: He is awake.      Appearance: Normal appearance. He is morbidly obese.      Comments: Does appear to be a somewhat pale in appearance.   HENT:      Head: Normocephalic and atraumatic.      Right Ear: External ear normal.      Left Ear: External ear normal.      Nose: Nose normal. No congestion or rhinorrhea.      Mouth/Throat:      Mouth: Mucous membranes are moist. Mucous membranes are pale.      Pharynx: Oropharynx is clear. No oropharyngeal exudate or posterior oropharyngeal erythema.   Eyes:      General: Lids are normal.         Right eye: No discharge.         Left eye: No discharge.      Extraocular Movements: Extraocular movements intact.      Conjunctiva/sclera: Conjunctivae normal.      Pupils: Pupils are equal, round, and reactive to light.   Cardiovascular:      Rate and Rhythm: Normal rate and regular rhythm.      Pulses: Normal pulses.      Heart sounds: Normal heart sounds. No murmur heard.     No friction rub. No gallop.   Pulmonary:      Effort: Pulmonary effort is normal. No respiratory distress.      Breath sounds: Normal air entry. No stridor. Examination of the right-lower field reveals decreased  breath sounds. Examination of the left-lower field reveals decreased breath sounds. Decreased breath sounds present. No wheezing, rhonchi or rales.   Chest:      Chest wall: No tenderness.   Abdominal:      General: Abdomen is flat. Bowel sounds are normal. There is no distension.      Palpations: Abdomen is soft. There is no mass.      Tenderness: There is no abdominal tenderness. There is no guarding or rebound.      Hernia: No hernia is present.   Musculoskeletal:         General: No swelling, tenderness, deformity or signs of injury. Normal range of motion.      Right lower leg: Swelling present. 3+ Pitting Edema present.      Left lower leg: Swelling present. 3+ Pitting Edema present.   Skin:     General: Skin is warm and dry.      Capillary Refill: Capillary refill takes less than 2 seconds.      Coloration: Skin is pale.      Findings: No bruising, erythema, lesion or rash.   Neurological:      General: No focal deficit present.      Mental Status: He is alert and oriented to person, place, and time.      GCS: GCS eye subscore is 4. GCS verbal subscore is 5. GCS motor subscore is 6.      Cranial Nerves: Cranial nerves 2-12 are intact. No cranial nerve deficit.      Sensory: Sensation is intact. No sensory deficit.      Motor: Motor function is intact. No weakness.      Coordination: Coordination is intact. Coordination normal.      Deep Tendon Reflexes: Reflexes normal.   Psychiatric:         Attention and Perception: Attention and perception normal.         Mood and Affect: Mood and affect normal.         Speech: Speech normal.         Behavior: Behavior normal. Behavior is cooperative.         Thought Content: Thought content normal.         Judgment: Judgment normal.          Labs Reviewed   CBC WITH AUTO DIFFERENTIAL - Abnormal       Result Value    WBC 9.6      nRBC 0.0      RBC 2.41 (*)     Hemoglobin 6.9 (*)     Hematocrit 22.5 (*)     MCV 93      MCH 28.6      MCHC 30.7 (*)     RDW 13.9      Platelets  265      Neutrophils % 78.8      Immature Granulocytes %, Automated 0.3      Lymphocytes % 10.2      Monocytes % 8.5      Eosinophils % 1.8      Basophils % 0.4      Neutrophils Absolute 7.60      Immature Granulocytes Absolute, Automated 0.03      Lymphocytes Absolute 0.98 (*)     Monocytes Absolute 0.82      Eosinophils Absolute 0.17      Basophils Absolute 0.04     COMPREHENSIVE METABOLIC PANEL - Abnormal    Glucose 129 (*)     Sodium 145      Potassium 4.0      Chloride 113 (*)     Bicarbonate 23      Anion Gap 13      Urea Nitrogen 50 (*)     Creatinine 3.06 (*)     eGFR 22 (*)     Calcium 7.9 (*)     Albumin 3.1 (*)     Alkaline Phosphatase 57      Total Protein 5.3 (*)     AST 11      Bilirubin, Total 0.2      ALT 8 (*)    MAGNESIUM - Abnormal    Magnesium 1.10 (*)    APTT - Abnormal    aPTT 40 (*)     Narrative:     The APTT is no longer used for monitoring Unfractionated Heparin Therapy. For monitoring Heparin Therapy, use the Heparin Assay.   PROTIME-INR - Abnormal    Protime 21.5 (*)     INR 1.9 (*)    B-TYPE NATRIURETIC PEPTIDE - Abnormal     (*)     Narrative:        <100 pg/mL - Heart failure unlikely  100-299 pg/mL - Intermediate probability of acute heart                  failure exacerbation. Correlate with clinical                  context and patient history.    >=300 pg/mL - Heart Failure likely. Correlate with clinical                  context and patient history.    BNP testing is performed using different testing methodology at [facility] than at other [facility] hospitals. Direct result comparisons should only be made within the same method.      BLOOD GAS ARTERIAL - Abnormal    POCT pH, Arterial 7.45 (*)     POCT pCO2, Arterial 38      POCT pO2, Arterial 69 (*)     POCT SO2, Arterial 95      POCT Oxy Hemoglobin, Arterial 94.4      POCT Base Excess, Arterial 2.4      POCT HCO3 Calculated, Arterial 26.4 (*)     Patient Temperature 37.0      FiO2 21      Site of Arterial  Puncture Radial Left      Prabhakar's Test Positive     CBC - Abnormal    WBC 8.3      nRBC 0.0      RBC 2.54 (*)     Hemoglobin 7.2 (*)     Hematocrit 23.7 (*)     MCV 93      MCH 28.3      MCHC 30.4 (*)     RDW 14.3      Platelets 279     BASIC METABOLIC PANEL - Abnormal    Glucose 160 (*)     Sodium 146 (*)     Potassium 4.1      Chloride 114 (*)     Bicarbonate 24      Anion Gap 12      Urea Nitrogen 49 (*)     Creatinine 3.03 (*)     eGFR 22 (*)     Calcium 7.8 (*)    POCT GLUCOSE - Abnormal    POCT Glucose 155 (*)    PHOSPHORUS - Normal    Phosphorus 3.1     LACTATE - Normal    Lactate 1.5      Narrative:     Venipuncture immediately after or during the administration of Metamizole may lead to falsely low results. Testing should be performed immediately  prior to Metamizole dosing.   SERIAL TROPONIN-INITIAL - Normal    Troponin I, High Sensitivity 20      Narrative:     Less than 99th percentile of normal range cutoff-  Female and children under 18 years old <14 ng/L; Male <21 ng/L: Negative  Repeat testing should be performed if clinically indicated.     Female and children under 18 years old 14-50 ng/L; Male 21-50 ng/L:  Consistent with possible cardiac damage and possible increased clinical   risk. Serial measurements may help to assess extent of myocardial damage.     >50 ng/L: Consistent with cardiac damage, increased clinical risk and  myocardial infarction. Serial measurements may help assess extent of   myocardial damage.      NOTE: Children less than 1 year old may have higher baseline troponin   levels and results should be interpreted in conjunction with the overall   clinical context.     NOTE: Troponin I testing is performed using a different   testing methodology at [facility] than at other   [facility] hospitals. Direct result comparisons should only   be made within the same method.   SERIAL TROPONIN, 1 HOUR - Normal    Troponin I, High Sensitivity 20      Narrative:     Less than 99th  percentile of normal range cutoff-  Female and children under 18 years old <14 ng/L; Male <21 ng/L: Negative  Repeat testing should be performed if clinically indicated.     Female and children under 18 years old 14-50 ng/L; Male 21-50 ng/L:  Consistent with possible cardiac damage and possible increased clinical   risk. Serial measurements may help to assess extent of myocardial damage.     >50 ng/L: Consistent with cardiac damage, increased clinical risk and  myocardial infarction. Serial measurements may help assess extent of   myocardial damage.      NOTE: Children less than 1 year old may have higher baseline troponin   levels and results should be interpreted in conjunction with the overall   clinical context.     NOTE: Troponin I testing is performed using a different   testing methodology at [facility] than at other   [facility]. Direct result comparisons should only   be made within the same method.   TROPONIN SERIES- (INITIAL, 1 HR)    Narrative:     The following orders were created for panel order Troponin Series, (0, 1 HR).  Procedure                               Abnormality         Status                     ---------                               -----------         ------                     Troponin I, High Sensiti...[[id number]]  Normal              Final result               Troponin, High Sensitivi...[[id number]]  Normal              Final result                 Please view results for these tests on the individual orders.   TYPE AND SCREEN    ABO TYPE B      Rh TYPE NEG      ANTIBODY SCREEN NEG     HEMOGLOBIN A1C   PREPARE RBC    PRODUCT CODE [id number]      Unit Number [id number]      Unit ABO O      Unit RH NEG      XM INTEP COMP      Dispense Status TR      Blood Expiration Date December 21, 2023 23:59 EST      PRODUCT BLOOD TYPE 9500      UNIT VOLUME 350          XR chest 2 views   Final Result   There are mild bibasilar infiltrates as well as small bilateral   pleural  effusions.   Signed by Cyrus Dockery MD           Procedures     Medical Decision Making  Patient presents to the ED for evaluation for shortness of breath.  Patient does appear to be pale and is noted to be anemic with a hemoglobin of 6.9.  I obtained consent from the patient for blood transfusion his magnesium level was low and he was ordered 2 g of magnesium sulfate IV.  He will be reassessed after getting his blood transfusion.  Patient is noted to have mild bibasilar infiltrates as well as small bilateral pleural effusions per the radiologist. Patient has evidence of renal disease with a BUN of 50 creatinine 3.06 with a GFR of 22.  Magnesium was low at 1.1.  BNP was high at 376.  CBC revealed a white cell count normal at 9.6.  ABG revealed a pH of 7.45.  pCO2 is 38 and pO2 is 69.  Patient was reassessed after getting 1 unit of blood and he continues to complain of shortness of breath with ambulation.  His pulse ox dropped from 96 to 93% with ambulation.  I then had a communication with Dr. Selby his nephrologist and if told me the patient's very complex and most likely want be getting a renal transplant anytime soon.  If I felt the patient needed I should admit the patient for diuresis.  Then had a discussion with the patient himself and he feels that if he can get the fluid off his lungs that he would feel much better at home so he was admitted to the hospital for diuresis and treatment of his congestive heart failure.    Amount and/or Complexity of Data Reviewed  ECG/medicine tests: independent interpretation performed.     Details: EKG was interpreted by myself at 9:11 AM reveals normal sinus rhythm with nonspecific T wave changes with no other acute findings noted.  Heart rate 88 bpm.  The OR interval is 180 ms.  The QRS durations 78 ms.  The QTc is 438 ms.  Axis is 32 degrees.         Diagnoses as of 11/29/23 0708   Acute on chronic congestive heart failure, unspecified heart failure type (CMS/HCC)    Chronic kidney disease, unspecified CKD stage   CIARRA (acute kidney injury) (CMS/[facility])                    Chris Malone,   11/29/23 0708

## 2023-11-29 NOTE — H&P
Assessment/Plan   1.)  Acute renal failure with volume overload  Patient will be admitted to medical.  Consult will be made to nephrology Dr. Selby.  Patient is holding considerable fluid in his lower extremities.  He did receive some Lasix, but he did not diurese very well.  Will only complaint from the neurologist.  2.)  CHF  Breathing better since he was in the emergency room.  Will order a continuous pulse ox.  Monitor strict I's and O's.  I will order 1 mg of IV Bumex in the morning.  I did independently review a chest x-ray done in the emergency room, and my interpretation is congestive heart failure.  I also independently reviewed a rhythm strip done in the emergency room, and my interpretation is normal sinus rhythm with a rate of 80 bpm.  I will continue to follow patient clinically, and make adjustments accordingly.  3)  Type 2 diabetes mellitus  I did order his Tresiba long-acting insulin starting tomorrow night.  I will start him on a NovoLog sliding scale.  I will also check a hemoglobin A1c.  Will adjust accordingly.  4.)  Hyperlipidemia  Lipitor 40 mg p.o. at bedtime.  5.)  Coronary artery disease  Carvedilol 12.5 mg p.o. twice daily.  Continue Eliquis.  6.)  History of DVT  Eliquis 5 mg p.o. twice daily    Chief Complaint   Patient presents with    Shortness of Breath     Amb to ED with c/o being Sob for the last year. He reports that he has felt more SOB the last 2-3 days. He is pale and states that he has been battling a trending low hemoglobin for some time as well. Has had a prod cough of off white colored sputum. Denies any pain. EKG done at .        History Of Present Illness  Garcia Drummond is a 63 y.o. male presenting with the above.  Patient has a history of transplant, and for the last 3 days has been coughing and getting more more short of breath.  He is also been gaining weight and holding onto fluid he states.  The patient was told that he may need another renal transplant.  He had a  recent heart catheterization that was normal.  He denies any chest pain, nausea, vomiting, and or diaphoresis.  No shortness of breath gets worse with activity.  His past medical history is consistent with end-stage renal disease posttransplant, obesity, hypertension, hyperlipidemia, COPD, coronary artery disease, type 2 diabetes mellitus, obstructive sleep apnea, and previous DVT.  He states he is comfortable while at rest.  He presents at this time for further evaluation.    Past Medical History  He has a past medical history of Body mass index (BMI) 34.0-34.9, adult (02/15/2022), Body mass index (BMI) 36.0-36.9, adult (11/01/2022), Chronic diarrhea (9/2/23), Chronic kidney disease (1990), Diverticulosis (9/15/23), Obesity, unspecified (08/17/2022), Other conditions influencing health status (07/06/2022), Other conditions influencing health status (10/25/2022), Other conditions influencing health status (10/25/2022), Other specified abnormal findings of blood chemistry (08/11/2021), Personal history of other diseases of the respiratory system (01/19/2022), and Personal history of other specified conditions (01/12/2021).    Surgical History  He has a past surgical history that includes Other surgical history (07/24/2019); Other surgical history (07/24/2019); US guided percutaneous peritoneal or retroperitoneal fluid collection drainage (07/05/2019); Kidney transplant (2018); and Cardiac catheterization (Left, 11/16/2023).    Allergies  Corticotropin, Empagliflozin, and Iodinated contrast media     Social History  He reports that he quit smoking about a year ago. His smoking use included cigarettes. He has a 30.00 pack-year smoking history. He has never used smokeless tobacco. He reports that he does not currently use alcohol. He reports that he does not use drugs.    Family History  Unknown by the patient.    Current Medication  [START ON 11/29/2023] polyethylene glycol, 17 g, oral, Daily      Current Outpatient  Medications   Medication Instructions    albuterol 90 mcg/actuation inhaler 2 puffs, inhalation, Every 6 hours PRN    ALPRAZolam (XANAX) 0.5 mg, oral, Nightly PRN    Aranesp (in polysorbate) 60 mcg, subcutaneous, Every 14 days    aspirin 81 mg, oral, Daily    atorvastatin (Lipitor) 40 mg tablet 1 tablet, oral, Nightly    blood sugar diagnostic (Accu-Chek SmartView Test Strip) strip USE 3 TIMES DAILY    Breztri Aerosphere 160-9-4.8 mcg/actuation HFA aerosol inhaler 2 puffs 2 times a day.    bumetanide (BUMEX) 1 mg, oral, Daily    calcium 500 mg calcium (1,250 mg) tablet 1 tablet, oral, Daily    carvedilol (Coreg) 12.5 mg tablet TAKE 1 TABLET BY MOUTH TWICE  DAILY    cyanocobalamin (VITAMIN B-12) 1,000 mcg, oral, Daily, as directed    Eliquis 5 mg, oral, 2 times daily, Please resume tomorrow 11/17/23    fish oil concentrate (Omega-3) 120-180 mg capsule 1 capsule, oral, 2 times daily    HumaLOG KwikPen Insulin 100 unit/mL injection INJECT SUBCUTANEOUSLY 20  UNITS BEFORE MEALS    insulin degludec (TRESIBA FLEXTOUCH U-200) 84 Units, subcutaneous, Nightly    levocetirizine (Xyzal) 5 mg tablet 1 tablet, oral, Nightly PRN    levothyroxine (SYNTHROID, LEVOXYL) 150 mcg, oral, Daily    magnesium gluconate (Magonate) 27.5 mg magne- sium (500 mg) tablet 1 tablet, oral, Nightly    mometasone (Nasonex) 50 mcg/actuation nasal spray 1 spray, nasal, 2 times daily    multivitamin tablet 1 tablet, oral, Daily    mycophenolate (CELLCEPT) 500 mg, oral, 2 times daily    NIFEdipine CC 30 mg 24 hr tablet 1 tablet, oral, Daily    omeprazole (PriLOSEC) 20 mg DR capsule 1 capsule, oral, Daily    predniSONE (Deltasone) 5 mg tablet 1 tablet, oral, Daily    sodium bicarbonate 650 mg tablet 2 tablets, oral, 2 times daily    tacrolimus (PROGRAF) 1 mg, oral, Every 12 hours    tamsulosin (Flomax) 0.4 mg 24 hr capsule TAKE 1 CAPSULE BY MOUTH  DAILY        Review of Systems  Constitutional : No fever, chills, nausea, vomiting, and/or  diarrhea.  Cardiac:  No chest pain, and or palpitations.  He does have extensive lower extremity edema.  He does have some orthopnea when he lies flat.  Pulmonary: Shortness of breath with a nonproductive cough.  No hemoptysis.  GI: No abdominal pain, black stool, hematochezia, and/or change in bowel habits.  Genitourinary: No dysuria, hematuria, and/or pyuria.  No flank pain.  Endocrine: No polydipsia, polyuria, or polyphagia.  History of type 2 diabetes mellitus.  Musculoskeletal: No back pain or neck pain.  No history of rheumatoid arthritis.  Hematology/Oncology: No history of cancer, lymphoma, and or leukemia. No history of anemia.   Neuro: No visual disturbance.  No paresis, paralysis, and or paresthesia.   Psych: No history of depression and/or anxiety.  No history of psychosis.  Skin: No rashes, or specific lesions.  No history of skin cancer.    Physical Exam  Last Recorded Vitals  /77   Pulse 80   Temp 37.1 °C (98.8 °F)   Resp 16   Wt 132 kg (290 lb)   SpO2 96%   General: Alert and oriented.  No apparent distress.  Skin: Warm and dry.  No rashes.  Neck: Supple.  No adenopathy or bruit.  HEENT: No infectious processes.  Passages are clear.  Heart: Regular without murmurs, gallops, and or rubs.  Lungs: Bibasilar crackles.  No wheezing or rhonchi.  Abdomen: Soft and nontender.  Bowel sounds positive.  No guarding or rebound.  No palpable masses.  Extremities: 2-3+ pitting edema both lower extremities.  No distal cyanosis.  Neurological: PERRLA.  EOMI.  Cranial nerves grossly intact.  No focal weakness.  Sensory grossly intact.  Babinski's are bilaterally downgoing.    Relevant Results  Results for orders placed or performed during the hospital encounter of 11/28/23 (from the past 24 hour(s))   CBC and Auto Differential   Result Value Ref Range    WBC 9.6 4.4 - 11.3 x10*3/uL    nRBC 0.0 0.0 - 0.0 /100 WBCs    RBC 2.41 (L) 4.50 - 5.90 x10*6/uL    Hemoglobin 6.9 (L) 13.5 - 17.5 g/dL    Hematocrit 22.5  (L) 41.0 - 52.0 %    MCV 93 80 - 100 fL    MCH 28.6 26.0 - 34.0 pg    MCHC 30.7 (L) 32.0 - 36.0 g/dL    RDW 13.9 11.5 - 14.5 %    Platelets 265 150 - 450 x10*3/uL    Neutrophils % 78.8 40.0 - 80.0 %    Immature Granulocytes %, Automated 0.3 0.0 - 0.9 %    Lymphocytes % 10.2 13.0 - 44.0 %    Monocytes % 8.5 2.0 - 10.0 %    Eosinophils % 1.8 0.0 - 6.0 %    Basophils % 0.4 0.0 - 2.0 %    Neutrophils Absolute 7.60 1.20 - 7.70 x10*3/uL    Immature Granulocytes Absolute, Automated 0.03 0.00 - 0.70 x10*3/uL    Lymphocytes Absolute 0.98 (L) 1.20 - 4.80 x10*3/uL    Monocytes Absolute 0.82 0.10 - 1.00 x10*3/uL    Eosinophils Absolute 0.17 0.00 - 0.70 x10*3/uL    Basophils Absolute 0.04 0.00 - 0.10 x10*3/uL   Comprehensive metabolic panel   Result Value Ref Range    Glucose 129 (H) 74 - 99 mg/dL    Sodium 145 136 - 145 mmol/L    Potassium 4.0 3.5 - 5.3 mmol/L    Chloride 113 (H) 98 - 107 mmol/L    Bicarbonate 23 21 - 32 mmol/L    Anion Gap 13 10 - 20 mmol/L    Urea Nitrogen 50 (H) 6 - 23 mg/dL    Creatinine 3.06 (H) 0.50 - 1.30 mg/dL    eGFR 22 (L) >60 mL/min/1.73m*2    Calcium 7.9 (L) 8.6 - 10.3 mg/dL    Albumin 3.1 (L) 3.4 - 5.0 g/dL    Alkaline Phosphatase 57 33 - 136 U/L    Total Protein 5.3 (L) 6.4 - 8.2 g/dL    AST 11 9 - 39 U/L    Bilirubin, Total 0.2 0.0 - 1.2 mg/dL    ALT 8 (L) 10 - 52 U/L   Magnesium   Result Value Ref Range    Magnesium 1.10 (L) 1.60 - 2.40 mg/dL   Phosphorus   Result Value Ref Range    Phosphorus 3.1 2.5 - 4.9 mg/dL   Lactate   Result Value Ref Range    Lactate 1.5 0.4 - 2.0 mmol/L   aPTT   Result Value Ref Range    aPTT 40 (H) 27 - 38 seconds   Protime-INR   Result Value Ref Range    Protime 21.5 (H) 9.8 - 12.8 seconds    INR 1.9 (H) 0.9 - 1.1   B-Type Natriuretic Peptide   Result Value Ref Range     (H) 0 - 99 pg/mL   Troponin I, High Sensitivity, Initial   Result Value Ref Range    Troponin I, High Sensitivity 20 0 - 20 ng/L   Blood Gas Arterial   Result Value Ref Range    POCT pH,  Arterial 7.45 (H) 7.38 - 7.42 pH    POCT pCO2, Arterial 38 38 - 42 mm Hg    POCT pO2, Arterial 69 (L) 85 - 95 mm Hg    POCT SO2, Arterial 95 94 - 100 %    POCT Oxy Hemoglobin, Arterial 94.4 94.0 - 98.0 %    POCT Base Excess, Arterial 2.4 -2.0 - 3.0 mmol/L    POCT HCO3 Calculated, Arterial 26.4 (H) 22.0 - 26.0 mmol/L    Patient Temperature 37.0 degrees Celsius    FiO2 21 %    Site of Arterial Puncture Radial Left     Prabhakar's Test Positive    Troponin, High Sensitivity, 1 Hour   Result Value Ref Range    Troponin I, High Sensitivity 20 0 - 20 ng/L   Prepare RBC: 1 Units   Result Value Ref Range    PRODUCT CODE X0764H18     Unit Number [id number]     Unit ABO O     Unit RH NEG     XM INTEP COMP     Dispense Status TR     Blood Expiration Date December 21, 2023 23:59 EST     PRODUCT BLOOD TYPE 9500     UNIT VOLUME 350    Type and screen   Result Value Ref Range    ABO TYPE B     Rh TYPE NEG     ANTIBODY SCREEN NEG       XR chest 2 views    Result Date: 11/28/2023  STUDY: Chest Radiographs;  11/28/2023 at 9:54 AM. INDICATION: Shortness of breath. COMPARISON: XR chest 4/11/2023, 7/18/2022. ACCESSION NUMBER(S): [id number] ORDERING CLINICIAN: YULI NG TECHNIQUE:  Frontal and lateral chest. FINDINGS: CARDIOMEDIASTINAL SILHOUETTE: Cardiomediastinal silhouette is normal in size and configuration.  LUNGS: There are mild bibasilar infiltrates as well as small bilateral pleural effusions.  ABDOMEN: No remarkable upper abdominal findings.  BONES: No acute osseous changes.    There are mild bibasilar infiltrates as well as small bilateral pleural effusions. Signed by Cyrus Dockery MD    Cardiac catheterization - coronary    Result Date: 11/16/2023   [facility], Cath Lab, [address] [place] [address] Cardiovascular Catheterization Report Patient Name:     WILVER PALMER        Performing Physician:  [id number] Jordy Davison MD Study Date:        11/16/2023          Verifying Physician:   [id number] Jordy Davison MD MRN/PID:          [id number]            Cardiologist/Co-scrub: Accession#:       [id number]        Ordering Provider:     [id number] DAVISON Date of           1960 / 63      Fellow: Birth/Age:        years Gender:           M                   Fellow: Encounter#:       [id number]  Study: Left Heart Cath  Indications: WILVER PALMER is a 63 year old male who presents with dyslipidemia, hypertension, diabetes, obesity, ESRD, s/p LDKT 2018, chronic AMR with G4 CKD, proteinuria, anemia and a chest pain assessment of atypical angina. Preop for renal transplant. Medical History: Stress test performed: No. CTA performed: No. Agatston accessed: Yes. Agatston Score: 1086. LVEF Assessed: Yes. LVEF = 65%.  Procedure Description: After infiltration with 2% Lidocaine, the right ulnar artery was cannulated with a modified Seldinger technique. Subsequently a 6 Pashto sheath was placed in the right ulnar artery. Selective coronary catheterization was performed using a 5 Fr catheter(s) exchanged over a guide wire to cannulate the coronary arteries. A JL 3.5 tip catheter was used for left coronary injections. A JR 4 tip catheter was used for right coronary injections. Multiple injections of contrast were made into the left and right coronary arteries with angiograms recorded in multiple projections. After completion of the procedure, the arterial sheath was pulled and a TR Band Radial Compression Device was utilized to obtain patent hemostasis.  Procedure Description Comments:  I was able to access the right radial artery with great blood return but could not pass the wire through as the artery is severely diseased on ultrasound.  Coronary Angiography: The coronary circulation is right dominant.  Coronary Angiography Comments:  Total of 25 cc of contrast was used for coronary angiogram.  Left Main  Coronary Artery: The left main coronary artery is a normal caliber vessel. The left main arises normally from the left coronary sinus of Valsalva and bifurcates into the LAD and circumflex coronary arteries. The left main coronary artery showed no significant disease or stenosis greater than 30%.  Left Anterior Descending Coronary Artery Distribution: The left anterior descending coronary artery is a normal caliber vessel. The LAD arises normally from the left main coronary artery. The LAD demonstrated no significant disease or stenosis greater than 30%. The 1st diagonal branch showed no significant disease or stenosis greater than 30%. The 2nd diagonal branch demonstrated no significant disease or stenosis greater than 30%.  Circumflex Coronary Artery Distribution: The circumflex coronary artery is a normal caliber vessel. The circumflex arises normally from the left main coronary artery and terminates in the AV groove. The circumflex revealed no significant disease or stenosis greater than 30%. The proximal 1st obtuse marginal branch showed 80-90%. The mid 2nd obtuse marginal branch demonstrated 30% stenosis.  Right Coronary Artery Distribution: The right coronary artery is a normal caliber vessel. The RCA arises normally from the right sinus of Valsalva. The RCA showed no significant disease or stenosis greater than 30%. The acute marginal branch showed no significant disease or stenosis greater than 30%. The right posterolateral branch showed no significant disease or stenosis greater than 30%. The right posterior descending artery showed no significant disease or stenosis greater than 30%.  Coronary Lesion Summary: Vessel   Stenosis   Vessel Segment OM 1      80-90%       proximal OM 2   30% stenosis      mid  Hemo Personnel: +----------------+---------+ Name            Duty      +----------------+---------+ Jordy Davison MD 1 +----------------+---------+  Hemodynamic Pressures:   +----+---------------------+----------+-------------+--------------+---------+ Site      Date Time      Phase NameSystolic mmHgDiastolic mmHgMean mmHg +----+---------------------+----------+-------------+--------------+---------+   AO11/16/2023 2:39:15 PM  AIR REST          158            95      122 +----+---------------------+----------+-------------+--------------+---------+   AO11/16/2023 2:45:53 PM  AIR REST          142            95      116 +----+---------------------+----------+-------------+--------------+---------+  Complications: No in-lab complications observed.  Cardiac Cath Post Procedure Notes: Post Procedure Diagnosis: See below. Blood Loss:               Estimated blood loss during the procedure was 5 mls. Specimens Removed:        Number of specimen(s) removed: none. ____________________________________________________________________________________ CONCLUSIONS:  1. Mild main vessel CAD and severe branch vessel [proximal OM1 with 80-90% stenosis] CAD in a right-dominant system.  2. No indication for PCI. Recommend continuing ASA 81, Statin, and beta blocker perioperatively.  3. Further management as per Dr. Hardin. ICD 10 Codes: Kidney replaced by transplant-Z94.0  CPT Codes: Moderate Sedation Services 1st additional 15 minutes patient >5 years-05880; Moderate Sedation Services 2nd additional 15 minutes patient >5 years-99040; Ultrasound guidance for vascular access-22516; Left Heart Cath (visualization of coronaries) and LV-79834  [id number] Jordy Davison MD Performing Physician Electronically signed by [id number] Jordy Davison MD on 11/16/2023 at 3:09:22 PM  ** Final **     Vascular US ankle brachial index (ANTHONY) without exercise    Result Date: 11/7/2023                 [address], [address] [address]       Tel [phone] and Fax [phone]  Vascular Lab Report [facility] US ANKLE BRACHIAL INDEX (ANTHONY) WITHOUT EXERCISE  Patient Name:      WILVER ELTON  SPENCER         Reading Physician:  [id number] Óscar Evans MD Study Date:        11/6/2023            Ordering Physician: [id number] PAM CAMERON MRN/PID:           [id number]             Technologist:       Brandi Pruett RVT Accession#:        [id number]         Technologist 2: Date of Birth/Age: 1960 / 63 years Encounter#:         [id number] Gender:            M Admission Status:  Outpatient           Location Performed: [facility]  Diagnosis/ICD: Encounter for other preprocedural examination-Z01.818;                Atherosclerosis of aorta-I70.0 Indication:    I70.0; Z01.818 CPT Codes:     20383 Peripheral artery ANTHONY Only  CONCLUSIONS: Right Lower PVR: No evidence of arterial occlusive disease in the right lower extremity at rest. Right pressures of >220 mmHg suggest no compressibility of vessels and may make absolute Segmental Limb Pressures (SLP) unreliable. Normal digital perfusion noted. Triphasic flow is noted in the right dorsalis pedis artery and right posterior tibial artery. Left Lower PVR: No evidence of arterial occlusive disease in the left lower extremity at rest. Left pressures of >220 mmHg suggest no compressibility of vessels and may make absolute Segmental Limb Pressures (SLP) unreliable. Normal digital perfusion noted. Triphasic flow is noted in the left dorsalis pedis artery and left posterior tibial artery.  Imaging & Doppler Findings:  RIGHT Lower PVR                Pressures Ratios Right Posterior Tibial (Ankle) 202 mmHg  1.47 Right Dorsalis Pedis (Ankle)   187 mmHg  1.36 Right Digit (Great Toe)        145 mmHg  1.06   LEFT Lower PVR                Pressures Ratios Left Posterior Tibial (Ankle) 224 mmHg  1.64 Left Dorsalis Pedis (Ankle)   174 mmHg  1.27 Left Digit (Great Toe)        146 mmHg  1.07                     Right     Left Brachial Pressure 136  mmHg 137 mmHg   [id number] Óscar Evans MD Electronically signed by [id number] Óscar Evans MD on 11/7/2023 at 11:47:23 PM  ** Final **     Vascular US lower extremity venous duplex bilateral    Result Date: 11/7/2023                 [facility] [address], [address], [place], [place] [address]       Tel [phone] and Fax [phone]  Vascular Lab Report VASC US LOWER EXTREMITY VENOUS DUPLEX BILATERAL  Patient Name:      WILVER PALMER         Reading Physician:  [id number] Óscar Evans MD Study Date:        11/6/2023            Ordering Physician: [id number] CAMERON MRN/PID:           [id number]             Technologist:       Brandi Pruett T Accession#:        [id number]         Technologist 2: Date of Birth/Age: 1960 / 63 years Encounter#:         [id number] Gender:            M Admission Status:  Outpatient           Location Performed: [facility]  Diagnosis/ICD: Encounter for other preprocedural examination-Z01.818; Pain in                right leg-M79.604 Indication:    Limb pain. CPT Codes:     52230 Peripheral venous duplex scan for DVT complete  Patient History H/o RLEV DVT.  CONCLUSIONS: Right Lower Venous: No evidence of acute deep vein thrombus visualized in the right lower extremity. There are chronic changes visualized in the proximal femoral, mid femoral, distal Femoral, popliteal and posterior tibial veins. Left Lower Venous: No evidence of acute deep vein thrombus visualized in the left lower extremity.  Imaging & Doppler Findings:  Right                 Compressible Thrombus        Flow Distal External Iliac                       Spontaneous/Phasic CFV                       Yes        None   Spontaneous/Phasic PFV                       Yes        None FV Proximal             Partial    Chronic  Spontaneous/Phasic FV Mid                   Partial    Chronic FV Distal               Partial    Chronic Popliteal               Partial    Chronic  Spontaneous/Phasic Peroneal                  Yes        None PTV                     Partial    Chronic  Left                  Compress Thrombus        Flow Distal External Iliac                   Spontaneous/Phasic CFV                     Yes      None   Spontaneous/Phasic PFV                     Yes      None FV Proximal             Yes      None   Spontaneous/Phasic FV Mid                  Yes      None FV Distal               Yes      None Popliteal               Yes      None   Spontaneous/Phasic Peroneal                Yes      None PTV                     Yes      None  [id number] Óscar Evans MD Electronically signed by [id number] Óscar Evans MD on 11/7/2023 at 10:53:29 PM  ** Final **     CT chest wo IV contrast    Result Date: 11/4/2023  Interpreted By:  Antione Gregory, STUDY: CT CHEST WO IV CONTRAST;  11/3/2023 10:34 am   INDICATION: Signs/Symptoms:pre kidney transplant eval.   COMPARISON: 10/19/2023 09/16/2023   ACCESSION NUMBER(S): [id number]   ORDERING CLINICIAN: PAM CAMERON   TECHNIQUE: Helical data acquisition of the chest was obtained without intravenous contrast. Images were reformatted in axial, coronal, and sagittal planes.   FINDINGS: Robust mitral valve calcification. Mild aortic arch calcification without aneurysm.   Gynecomastia.   Body habitus does limit sensitivity. No axillary or central adenopathy.   4 mm right upper lobe nodule on image 71. There does appear to be subpleural reticulation of the lung bases. Query mild subtle fibrosis. No features of acute pneumonia. No adenopathy seen in the mediastinum. No central airway lesion.   Fatty infiltration of the liver. Small sliding hiatal hernia. Multilevel degenerative disc disease. No acute osseous abnormality       1.  4 mm too small to characterize right lobe nodule. Mild vascular calcification. Fatty  infiltration of the liver. This examination is performed in anticipation of upcoming liver transplant. No evidence of acute pneumonia     Signed by: Antione Gregory 11/4/2023 10:15 AM Dictation workstation:   [id number]       Don Kaplan. CURLY

## 2023-11-29 NOTE — PROGRESS NOTES
"Garcia Drummond is a 63 y.o. male on day 1 of admission presenting with CIARRA (acute kidney injury) (CMS/[facility]).    Subjective       Garcia is laying in bed in no distress.  Wife is at bedside.  Patient states he does have some shortness of breath and he is unable to walk short distances without becoming short of breath.  He currently has significant swelling to bilateral lower legs.  He has a very complex past medical history and has had kidney issues in the past.  Dr. Selby from nephrology saw the patient this morning and patient will be started on a Lasix drip.  No other new concerns per staffing.       Objective     Physical Exam  Constitutional: awake/alert/oriented x3, no distress, alert and cooperative  Eyes: clear sclera  ENMT: mucous membranes moist,   Respiratory/Thorax: Breath sounds diminished  Cardiovascular: Regular rate and rhythm,   Gastrointestinal: Abdominal obesity abdomen is soft  Musculoskeletal: ROM intact,   Neurological: alert and oriented x3,   Psychological: Appropriate mood and behavior, cooperative and pleasant  Skin: Warm and dry, significant edema to bilateral lower extremities.      Last Recorded Vitals  Blood pressure 161/89, pulse 76, temperature 36.8 °C (98.2 °F), resp. rate 18, height 1.827 m (5' 11.93\"), weight 136 kg (299 lb 2.6 oz), SpO2 96 %.  Intake/Output last 3 Shifts:  I/O last 3 completed shifts:  In: 600 (4.4 mL/kg) [P.O.:200; I.V.:50 (0.4 mL/kg); Blood:350]  Out: - (0 mL/kg)   Weight: 135.7 kg     Relevant Results  Scheduled medications  apixaban, 5 mg, oral, BID  aspirin, 81 mg, oral, Daily  atorvastatin, 40 mg, oral, Nightly  calcium carbonate-vitamin D3, 1 tablet, oral, Daily  carvedilol, 12.5 mg, oral, BID  cyanocobalamin, 1,000 mcg, oral, Daily  darbepoetin oneyda in polysorbat, 60 mcg, subcutaneous, Once  fluticasone, 2 spray, Each Nostril, Daily  insulin glargine, 76 Units, subcutaneous, q24h  insulin lispro, , ,   insulin lispro, 0-15 Units, subcutaneous, " q4h  levothyroxine, 150 mcg, oral, Daily  loratadine, 10 mg, oral, Daily  magnesium gluconate, 27.5 mg, oral, Nightly  multivitamin with minerals, 1 tablet, oral, Daily  mycophenolate, 500 mg, oral, BID  NIFEdipine ER, 30 mg, oral, Daily  pantoprazole, 40 mg, oral, Daily before breakfast  polyethylene glycol, 17 g, oral, Daily  predniSONE, 5 mg, oral, Daily  sodium bicarbonate, 1,300 mg, oral, BID  tacrolimus, 1 mg, oral, q12h  tamsulosin, 0.4 mg, oral, Daily      Continuous medications  furosemide, 10 mg/hr, Last Rate: 10 mg/hr (11/29/23 1314)      PRN medications  PRN medications: acetaminophen **OR** acetaminophen **OR** acetaminophen, albuterol, ALPRAZolam, dextrose 10 % in water (D10W), dextrose, glucagon, insulin lispro, ondansetron ODT **OR** ondansetron    Results for orders placed or performed during the hospital encounter of 11/28/23 (from the past 24 hour(s))   POCT GLUCOSE   Result Value Ref Range    POCT Glucose 155 (H) 74 - 99 mg/dL   CBC   Result Value Ref Range    WBC 8.3 4.4 - 11.3 x10*3/uL    nRBC 0.0 0.0 - 0.0 /100 WBCs    RBC 2.54 (L) 4.50 - 5.90 x10*6/uL    Hemoglobin 7.2 (L) 13.5 - 17.5 g/dL    Hematocrit 23.7 (L) 41.0 - 52.0 %    MCV 93 80 - 100 fL    MCH 28.3 26.0 - 34.0 pg    MCHC 30.4 (L) 32.0 - 36.0 g/dL    RDW 14.3 11.5 - 14.5 %    Platelets 279 150 - 450 x10*3/uL   Basic metabolic panel   Result Value Ref Range    Glucose 160 (H) 74 - 99 mg/dL    Sodium 146 (H) 136 - 145 mmol/L    Potassium 4.1 3.5 - 5.3 mmol/L    Chloride 114 (H) 98 - 107 mmol/L    Bicarbonate 24 21 - 32 mmol/L    Anion Gap 12 10 - 20 mmol/L    Urea Nitrogen 49 (H) 6 - 23 mg/dL    Creatinine 3.03 (H) 0.50 - 1.30 mg/dL    eGFR 22 (L) >60 mL/min/1.73m*2    Calcium 7.8 (L) 8.6 - 10.3 mg/dL   Hemoglobin A1C   Result Value Ref Range    Hemoglobin A1C 6.9 (H) see below %    Estimated Average Glucose 151 Not Established mg/dL   Ferritin   Result Value Ref Range    Ferritin 180 20 - 300 ng/mL   Iron and TIBC   Result Value Ref  Range    Iron 43 35 - 150 ug/dL    UIBC 169 110 - 370 ug/dL    TIBC 212 (L) 240 - 445 ug/dL    % Saturation 20 (L) 25 - 45 %   POCT GLUCOSE   Result Value Ref Range    POCT Glucose 118 (H) 74 - 99 mg/dL   POCT GLUCOSE   Result Value Ref Range    POCT Glucose 106 (H) 74 - 99 mg/dL         XR chest 2 views    Result Date: 11/28/2023  STUDY: Chest Radiographs;  11/28/2023 at 9:54 AM. INDICATION: Shortness of breath. COMPARISON: XR chest 4/11/2023, 7/18/2022. ACCESSION NUMBER(S): [id number] ORDERING CLINICIAN: YULI NG TECHNIQUE:  Frontal and lateral chest. FINDINGS: CARDIOMEDIASTINAL SILHOUETTE: Cardiomediastinal silhouette is normal in size and configuration.  LUNGS: There are mild bibasilar infiltrates as well as small bilateral pleural effusions.  ABDOMEN: No remarkable upper abdominal findings.  BONES: No acute osseous changes.    There are mild bibasilar infiltrates as well as small bilateral pleural effusions. Signed by Cyrus Dockery MD                                Assessment/Plan   Principal Problem:    CIARRA (acute kidney injury) (CMS/[facility])  Active Problems:    Diabetes type 2, controlled (CMS/HCC)    DVT (deep venous thrombosis) (CMS/HCC)    Hyperlipidemia    Hypertension    Coronary artery disease involving native coronary artery of native heart    This is a 63-year-old male admitted with acute renal failure, proteinuria, anemia ,hyperuricemia, chronic kidney disease, bilateral pleural effusions, fluid overload in the setting of history of renal transplant in July 25 to 2018 with acute rejection treated with IVIG and steroids in June 2021, membranous glomerulonephropathy, immunocompromise state, hypertension, type 2 diabetes on insulin, hypothyroidism, obesity, nicotine use, chronic kidney disease with creatinine range of 1.5-1.7, hyperlipidemia, DVTs in the past.    Acute on chronic kidney disease secondary to fluid overload   -Baseline creatinine is 1.5-1.7   -Current creatinine is 3.03/BUN  49   -Dr. Selby has consulted and advised that patient be started on a Lasix drip and Bumex is discontinued.   -Patient may need dialysis in the future.  Currently being reevaluated for a kidney transplant.   -Will monitor ins and outs closely.   -Monitor renal function closely.   -Follow tacrolimus level-continue Prograf 1 mg every 12 hours   -Dose of MONA therapy ordered-Aranesp injection given today.  -Continue sodium bicarb 1300 mg 2 times a day     Anemia   -Hemoglobin is 7.2   -Recent studies show no significant iron deficiency, B12, or folate deficiency    Type 2 diabetes mellitus   -Hemoglobin A1c is 6.9   -Blood sugars are currently controlled.   -Continue Lantus 76 units daily   -Continue Humalog sliding scale    Hyperlipidemia   -Continue Lipitor 40 mg daily    History of DVT   -Continue patient on Eliquis    Hypertension   -Continue Coreg 12.5 mg twice daily.   -Continue nifedipine 30 mg daily    Coronary artery disease   -Continue patient on aspirin    Hypothyroidism   -Continue levothyroxine    GERD   -Continue ProAir tonics 40 mg daily    BPH   -Flomax daily    Full Code    Anticipate discharge to home when medically ready.  May be 48 to 72 hours.    Labs in AM             I spent 30 minutes in the professional and overall care of this patient.      Yane Perez PA-C

## 2023-11-29 NOTE — PROGRESS NOTES
11/29/23 1106   Discharge Planning   Living Arrangements Spouse/significant other   Support Systems Spouse/significant other   Assistance Needed None   Type of Residence Private residence   Number of Stairs to Enter Residence 3   Number of Stairs Within Residence 0   Do you have animals or pets at home? No   Who is requesting discharge planning? Provider   Home or Post Acute Services None   Patient expects to be discharged to: Home with Wife   Does the patient need discharge transport arranged? No   Financial Resource Strain   How hard is it for you to pay for the very basics like food, housing, medical care, and heating? Not very   Housing Stability   In the last 12 months, was there a time when you were not able to pay the mortgage or rent on time? N   In the last 12 months, how many places have you lived? 1   In the last 12 months, was there a time when you did not have a steady place to sleep or slept in a shelter (including now)? N   Transportation Needs   In the past 12 months, has lack of transportation kept you from medical appointments or from getting medications? no   In the past 12 months, has lack of transportation kept you from meetings, work, or from getting things needed for daily living? No   Patient Choice   Provider Choice list and CMS website (https://medicare.gov/care-compare#search) for post-acute Quality and Resource Measure Data were provided and reviewed with: Other (Comment)  (NA)   Patient / Family choosing to utilize agency / facility established prior to hospitalization No     Met with pt at the bedside and verified address, phone number and emergency contact information. PCP is  Sabrina [facility] and pharmacy is [facility]. Pt is independent and lives at home with his wife and feels safe.  He is a diabetic and checks his sugar 3-4x/day otherwise uses no medical equipment and denies further needs at discharge. Plan is to discharge home with wife in the next 48-72 hrs per care rounds  meeting. [facility] 24/24. CT to follow. Crys Cheema BSN/RN-TCC

## 2023-11-29 NOTE — CONSULTS
Reason For Consult  Acute kidney injury    History Of Present Illness  Garcia Drummond is a 63 y.o. male presenting with shortness of breath.   He presented to the emergency room secondary to shortness of breath.  He has been battling this off and on and this has been becoming a recurring issue for him.  He states that the shortness of breath has become so bad that he cannot even walk to the bathroom and back without having shortness of breath  He also states that at times he feels like he cannot breathe at all  He does have peripheral edema with volume overload  He states that he cannot keep living like this  States he has been urinating pretty well  Does have a very complex past medical history with a renal transplant that has undergoing rejection and now dealing with fairly advanced chronic kidney disease once again  This a.m. he is resting fairly comfortably in bed but still states that he is feeling pretty short of breath and does get very short of breath with even minimal exertion  Does have the peripheral edema as stated  Past Medical History  He has a past medical history of Body mass index (BMI) 34.0-34.9, adult (02/15/2022), Body mass index (BMI) 36.0-36.9, adult (11/01/2022), Chronic diarrhea (9/2/23), Chronic kidney disease (1990), Diverticulosis (9/15/23), Obesity, unspecified (08/17/2022), Other conditions influencing health status (07/06/2022), Other conditions influencing health status (10/25/2022), Other conditions influencing health status (10/25/2022), Other specified abnormal findings of blood chemistry (08/11/2021), Personal history of other diseases of the respiratory system (01/19/2022), and Personal history of other specified conditions (01/12/2021).    Surgical History  He has a past surgical history that includes Other surgical history (07/24/2019); Other surgical history (07/24/2019); US guided percutaneous peritoneal or retroperitoneal fluid collection drainage (07/05/2019); Kidney  transplant (2018); and Cardiac catheterization (Left, 11/16/2023).     Social History  He reports that he quit smoking about a year ago. His smoking use included cigarettes. He has a 30.00 pack-year smoking history. He has never used smokeless tobacco. He reports that he does not currently use alcohol. He reports that he does not use drugs.    Family History  No family history on file.     Allergies  Corticotropin, Empagliflozin, and Iodinated contrast media    Review of Systems  A full 10 point review of systems was obtained is negative except HPI as above     Physical Exam  Physical Exam  Constitutional:       Appearance: Normal appearance.   HENT:      Head: Normocephalic and atraumatic.      Right Ear: External ear normal.      Left Ear: External ear normal.      Nose: Nose normal.      Mouth/Throat:      Mouth: Mucous membranes are moist.      Pharynx: Oropharynx is clear.   Eyes:      Extraocular Movements: Extraocular movements intact.      Conjunctiva/sclera: Conjunctivae normal.      Pupils: Pupils are equal, round, and reactive to light.   Cardiovascular:      Rate and Rhythm: Normal rate and regular rhythm.   Pulmonary:      Effort: Pulmonary effort is normal.      Breath sounds: Normal breath sounds.   Abdominal:      General: Abdomen is flat.      Palpations: Abdomen is soft.   Musculoskeletal:         General: Swelling present.      Right lower leg: Edema present.      Left lower leg: Edema present.   Skin:     General: Skin is warm and dry.   Neurological:      General: No focal deficit present.      Mental Status: He is alert and oriented to person, place, and time.   Psychiatric:         Mood and Affect: Mood normal.         Behavior: Behavior normal.                 I&O 24HR    Intake/Output Summary (Last 24 hours) at 11/29/2023 1152  Last data filed at 11/29/2023 0900  Gross per 24 hour   Intake 1080 ml   Output --   Net 1080 ml       Vitals 24HR  Heart Rate:  [69-92]   Temp:  [36.2 °C (97.2  "°F)-37.1 °C (98.8 °F)]   Resp:  [16-18]   BP: (143-174)/(57-95)   Height:  [182.7 cm (5' 11.93\")]   Weight:  [136 kg (299 lb 2.6 oz)]   SpO2:  [94 %-97 %]       Scheduled medications  apixaban, 5 mg, oral, BID  aspirin, 81 mg, oral, Daily  atorvastatin, 40 mg, oral, Nightly  bumetanide, 1 mg, oral, Daily  calcium carbonate-vitamin D3, 1 tablet, oral, Daily  carvedilol, 12.5 mg, oral, BID  cyanocobalamin, 1,000 mcg, oral, Daily  darbepoetin oneyda in polysorbat, 60 mcg, subcutaneous, q14 days  fluticasone, 2 spray, Each Nostril, Daily  insulin glargine, 76 Units, subcutaneous, q24h  insulin lispro, , ,   insulin lispro, 0-15 Units, subcutaneous, q4h  levothyroxine, 150 mcg, oral, Daily  loratadine, 10 mg, oral, Daily  magnesium gluconate, 27.5 mg, oral, Nightly  multivitamin with minerals, 1 tablet, oral, Daily  mycophenolate, 500 mg, oral, BID  NIFEdipine ER, 30 mg, oral, Daily  pantoprazole, 40 mg, oral, Daily before breakfast  polyethylene glycol, 17 g, oral, Daily  predniSONE, 5 mg, oral, Daily  sodium bicarbonate, 1,300 mg, oral, BID  tacrolimus, 1 mg, oral, q12h  tamsulosin, 0.4 mg, oral, Daily      Continuous medications     PRN medications  PRN medications: acetaminophen **OR** acetaminophen **OR** acetaminophen, albuterol, ALPRAZolam, dextrose 10 % in water (D10W), dextrose, glucagon, insulin lispro, ondansetron ODT **OR** ondansetron    Relevant Results  Reviewed lab results and imaging studies     Assessment/Plan       Shortness of breath  Bilateral pleural effusions  Renal Transplant July 25, 2018       Acute Rejection treated with IVIG and Steroids June 2021  Membranous Glomerulonephropathy  Immunocompromised State  HTN  DM II on Insulin  Hypothyroidism  Obesity  Nicotine use   CKD with Creat ranging 1.5-1.7 in past but now creatinine is elevated  Acute renal failure  HLD with Hypertriglyceridemia.   DVT and has now stopped his Eliquis  Proteinuria  Hyperuricemia    Plan:  He has been reevaluated for " transplant  He is well aware that his kidney function is not doing well at this time and dialysis may be a very real option in the very near future  At this time I will stop his oral Bumex and we will place him on a Lasix drip  Follow ins and outs closely  Follow renal function closely  We will see if we can help his shortness of breath at all  Follow tacrolimus level  We will give a dose of MONA therapy now  I will recheck iron studies  Thanks for the consult      Principal Problem:    CIARRA (acute kidney injury) (CMS/[facility])  Active Problems:    Diabetes type 2, controlled (CMS/[facility])    DVT (deep venous thrombosis) (CMS/[facility])    Hyperlipidemia    Hypertension    Coronary artery disease involving native coronary artery of native heart            Luis Daniel Selby, DO

## 2023-11-30 NOTE — PROGRESS NOTES
Garcia Drummond is a 63 y.o. male on day 2 of admission presenting with CIARRA (acute kidney injury) (CMS/[facility]).      Subjective   Patient seen and examined at the bedside this morning  He is resting comfortably in bed  He states that he is actually feeling a little bit better his breathing feels a little bit better  Swelling is improved  Urination has been good and in the last 24 hours has diuresed over 4 L         Objective          Vitals 24HR  Heart Rate:  [76-79]   Temp:  [36.7 °C (98.1 °F)-37.1 °C (98.7 °F)]   Resp:  [17-18]   BP: (161-179)/(72-89)   Weight:  [126 kg (276 lb 10.8 oz)]   SpO2:  [94 %-96 %]         Intake/Output last 3 Shifts:    Intake/Output Summary (Last 24 hours) at 11/30/2023 0706  Last data filed at 11/30/2023 0600  Gross per 24 hour   Intake 1216.77 ml   Output 3500 ml   Net -2283.23 ml       Physical Exam  Constitutional:       Appearance: Normal appearance.   HENT:      Head: Normocephalic and atraumatic.      Right Ear: External ear normal.      Left Ear: External ear normal.      Nose: Nose normal.      Mouth/Throat:      Mouth: Mucous membranes are moist.      Pharynx: Oropharynx is clear.   Eyes:      Extraocular Movements: Extraocular movements intact.      Conjunctiva/sclera: Conjunctivae normal.      Pupils: Pupils are equal, round, and reactive to light.   Cardiovascular:      Rate and Rhythm: Normal rate and regular rhythm.   Pulmonary:      Effort: Pulmonary effort is normal.      Breath sounds: Normal breath sounds.   Abdominal:      General: Abdomen is flat.      Palpations: Abdomen is soft.   Musculoskeletal:         General: Swelling present.      Right lower leg: Edema present.      Left lower leg: Edema present.   Skin:     General: Skin is warm and dry.   Neurological:      General: No focal deficit present.      Mental Status: He is alert and oriented to person, place, and time.   Psychiatric:         Mood and Affect: Mood normal.         Behavior: Behavior normal.          Relevant Results               Assessment/Plan              Principal Problem:    CIARRA (acute kidney injury) (CMS/[facility])  Active Problems:    Diabetes type 2, controlled (CMS/[facility])    DVT (deep venous thrombosis) (CMS/[facility])    Hyperlipidemia    Hypertension    Coronary artery disease involving native coronary artery of native heart    Shortness of breath: Improved  Bilateral pleural effusions  Renal Transplant July 25, 2018       Acute Rejection treated with IVIG and Steroids June 2021  Membranous Glomerulonephropathy  Immunocompromised State  HTN  DM II on Insulin  Hypothyroidism  Obesity  Nicotine use   CKD with Creat ranging 1.5-1.7 in past but now creatinine is elevated  Acute renal failure  HLD with Hypertriglyceridemia.   DVT and has now stopped his Eliquis  Proteinuria  Hyperuricemia    Plan:  At this time he seems to be improving clinically and his renal function is quite stable  He has had very good diuresis  Start on vitamin D replacement  Increase magnesium replacement to daily  I will give a dose of IV Venofer  He received MONA therapy yesterday  We will continue to follow blood counts closely  No evidence of bleeding at this time  Continue to follow strict ins and outs  Follow a.m. renal function  We will continue IV diuresis at this time and see how he does over the next 24 hours again                  Luis Daniel Selby, DO

## 2023-11-30 NOTE — PROGRESS NOTES
Garcia Drummond is a 63 y.o. male on day 2 of admission presenting with CIARRA (acute kidney injury) (CMS/[facility]).      Subjective   Patient states he is miserable but denies complaints of pain.  Unable to get more specifics from patient.  Does complain of shortness of breath with activity but states this is improving.  Lives as he is hoping to get on the transplant list for kidney transplant       Objective     Last Recorded Vitals  BP (!) 185/94   Pulse 74   Temp 36.7 °C (98.1 °F)   Resp 18   Wt 126 kg (276 lb 10.8 oz)   SpO2 95%   Intake/Output last 3 Shifts:    Intake/Output Summary (Last 24 hours) at 11/30/2023 1127  Last data filed at 11/30/2023 0700  Gross per 24 hour   Intake 736.77 ml   Output 3800 ml   Net -3063.23 ml       Admission Weight  Weight: 132 kg (290 lb) (11/28/23 0919)    Daily Weight  11/30/23 : 126 kg (276 lb 10.8 oz)    Image Results  XR chest 2 views  Narrative: STUDY:  Chest Radiographs;  11/28/2023 at 9:54 AM.  INDICATION:  Shortness of breath.  COMPARISON:  XR chest 4/11/2023, 7/18/2022.  ACCESSION NUMBER(S):  [id number]  ORDERING CLINICIAN:  YULI NG  TECHNIQUE:  Frontal and lateral chest.   FINDINGS:  CARDIOMEDIASTINAL SILHOUETTE:  Cardiomediastinal silhouette is normal in size and configuration.     LUNGS:  There are mild bibasilar infiltrates as well as small bilateral  pleural effusions.     ABDOMEN:  No remarkable upper abdominal findings.     BONES:  No acute osseous changes.  Impression: There are mild bibasilar infiltrates as well as small bilateral  pleural effusions.  Signed by Cyrus Dockery MD      Physical Exam  General Appearance: AAO x 3, not in acute distress  Skin: skin color pink, warm, and dry; no suspicious rashes or lesions  Eyes : PERRL, EOM's intact  ENT: mucous membranes pink and moist  Neck: normocephalic  Respiratory: lungs clear to auscultation anteriorly; no wheezing, rhonchi, or crackles.   Heart: regular rate and rhythm. telemetry shows sinus  rhythm  Abdomen: Nondistended, positive bowel sounds x4, soft,  nontender.  Patient complains of abdominal bloating  Extremities: Nonpitting edema noted to the left lower extremity and ankle.  Left greater than right  Peripheral pulses: normal x4 extremities  Neuro: alert, coherent and conversant, no focal motor deficits    Relevant Results  Results for orders placed or performed during the hospital encounter of 11/28/23 (from the past 24 hour(s))   POCT GLUCOSE   Result Value Ref Range    POCT Glucose 106 (H) 74 - 99 mg/dL   POCT GLUCOSE   Result Value Ref Range    POCT Glucose 165 (H) 74 - 99 mg/dL   POCT GLUCOSE   Result Value Ref Range    POCT Glucose 172 (H) 74 - 99 mg/dL   CBC   Result Value Ref Range    WBC 8.2 4.4 - 11.3 x10*3/uL    nRBC 0.0 0.0 - 0.0 /100 WBCs    RBC 2.46 (L) 4.50 - 5.90 x10*6/uL    Hemoglobin 7.2 (L) 13.5 - 17.5 g/dL    Hematocrit 23.0 (L) 41.0 - 52.0 %    MCV 94 80 - 100 fL    MCH 29.3 26.0 - 34.0 pg    MCHC 31.3 (L) 32.0 - 36.0 g/dL    RDW 13.9 11.5 - 14.5 %    Platelets 276 150 - 450 x10*3/uL   Comprehensive Metabolic Panel   Result Value Ref Range    Glucose 177 (H) 74 - 99 mg/dL    Sodium 146 (H) 136 - 145 mmol/L    Potassium 4.1 3.5 - 5.3 mmol/L    Chloride 111 (H) 98 - 107 mmol/L    Bicarbonate 26 21 - 32 mmol/L    Anion Gap 13 10 - 20 mmol/L    Urea Nitrogen 50 (H) 6 - 23 mg/dL    Creatinine 3.04 (H) 0.50 - 1.30 mg/dL    eGFR 22 (L) >60 mL/min/1.73m*2    Calcium 8.3 (L) 8.6 - 10.3 mg/dL    Albumin 3.0 (L) 3.4 - 5.0 g/dL    Alkaline Phosphatase 64 33 - 136 U/L    Total Protein 5.3 (L) 6.4 - 8.2 g/dL    AST 10 9 - 39 U/L    Bilirubin, Total 0.2 0.0 - 1.2 mg/dL    ALT 7 (L) 10 - 52 U/L   Magnesium   Result Value Ref Range    Magnesium 1.48 (L) 1.60 - 2.40 mg/dL   Phosphorus   Result Value Ref Range    Phosphorus 3.9 2.5 - 4.9 mg/dL   Vitamin D 25-Hydroxy,Total (for eval of Vitamin D levels)   Result Value Ref Range    Vitamin D, 25-Hydroxy, Total 7 (L) 30 - 100 ng/mL   POCT GLUCOSE    Result Value Ref Range    POCT Glucose 117 (H) 74 - 99 mg/dL   POCT GLUCOSE   Result Value Ref Range    POCT Glucose 166 (H) 74 - 99 mg/dL     Scheduled medications  apixaban, 5 mg, oral, BID  aspirin, 81 mg, oral, Daily  atorvastatin, 40 mg, oral, Nightly  calcium carbonate-vitamin D3, 1 tablet, oral, Daily  carvedilol, 12.5 mg, oral, BID  cholecalciferol, 5,000 Units, oral, Daily  cyanocobalamin, 1,000 mcg, oral, Daily  fluticasone, 2 spray, Each Nostril, Daily  insulin glargine, 76 Units, subcutaneous, q24h  insulin lispro, 0-5 Units, subcutaneous, TID with meals  levothyroxine, 150 mcg, oral, Daily  loratadine, 10 mg, oral, Daily  magnesium oxide, 400 mg, oral, TID  magnesium sulfate, 2 g, intravenous, Once  multivitamin with minerals, 1 tablet, oral, Daily  mycophenolate, 500 mg, oral, BID  NIFEdipine ER, 30 mg, oral, Daily  pantoprazole, 40 mg, oral, Daily before breakfast  polyethylene glycol, 17 g, oral, Daily  predniSONE, 5 mg, oral, Daily  sodium bicarbonate, 1,300 mg, oral, BID  tacrolimus, 1 mg, oral, q12h  tamsulosin, 0.4 mg, oral, Daily      Continuous medications  furosemide, 10 mg/hr, Last Rate: 10 mg/hr (11/29/23 1314)      PRN medications  PRN medications: acetaminophen **OR** acetaminophen **OR** acetaminophen, albuterol, ALPRAZolam, dextrose 10 % in water (D10W), dextrose, glucagon, ondansetron ODT **OR** ondansetron      Assessment/Plan        Principal Problem:    CIARRA (acute kidney injury) (CMS/[facility])  Active Problems:    Diabetes type 2, controlled (CMS/[facility])    DVT (deep venous thrombosis) (CMS/[facility])    Hyperlipidemia    Hypertension    Coronary artery disease involving native coronary artery of native heart    Plan:  AM labs ordered    Acute on chronic kidney disease secondary to fluid overload  -Dr. Selby following-appreciate recommendations  -Continue Lasix drip  -Creatinine remains 3.04 with baseline creatinine 1.5-1.7  -Continue Prograf    Anemia  -venofer ordered per nephrology  -Started  on vitamin D  -Continue vitamin B12    Hypomagnesemia  -Magnesium replaced.  Nephrology increased mag oxide    hyperLipidemia  -Continue atorvastatin    History of renal transplant  -Continue prednisone and mycophenolate    DVTs  -Continue Eliquis    CAD  -Continue aspirin    Diabetes  -Continue Lantus and sliding scale    Hypothyroidism  -Continue levothyroxine    hypertension  -Continue carvedilol and nifedipine    BPH  -Continue Flomax      Skyla Mclaughlin, APRN-CNP

## 2023-11-30 NOTE — PROGRESS NOTES
Met with pt at bedside who is currently still on Lasix IV drip. He is from home and independent. [facility] 24/24. ADOD is approximately 48hrs per care rounds meeting. Plan is to return home no needs at discharge. CT to follow. Crys Cheema BSN/RN-TCC

## 2023-12-01 NOTE — DISCHARGE INSTRUCTIONS
Discharge:    Discharge home  Resume home meds with the following changes.  Bumex changed to 2 mg every 12 hours, mag oxide was changed per Dr. Selby  Escribed to [facility] Bumex 2 mg every 12 hours, magnesium oxide, and vitamin D  Follow-up with PCP in 2 weeks  Follow-up with Dr. Selby in 1 week    Thank you for allowing [facility] to participate in your care. Return to the ER  if symptoms worsen

## 2023-12-01 NOTE — PROGRESS NOTES
Medication Education     Medication education for Garcia Drummond was provided to the patient and family for the following medication(s):  Bumetanide, magnesium oxide, Vitamin D      Medication education provided by a Pharmacist:  ADR Counseling Medication interactions Alternative drug Dose, frequency, storage How to take and what to do if a dose is missed Proper dose, indication, possible ADRs Refilling the medication  How the medication works and benefits of taking it Benefits of taking the medication  Importance of compliance Necessary labs and/or other monitoring Any drug interactions (including OTCs and herbvals) and importance of notifying a healthcare provider of any medication changes Potential duration of therapy Proper storage of the medication(s)    Identified potential barriers to education:  None    Method(s) of Education:  Verbal Written materials provided and reviewed    An opportunity to ask questions and receive answers was provided.     Assessment of understanding the patient and family:  2= meets goals/outcomes    Additional Notes (if applicable): Discussed increase in bumetanide.  We filled his script with meds to beds. He declined Magnesium and Vit D OTC because he has both at home.  I did explain the instructions on both.  Went through entire med list.  Removed mometasone spray (no longer uses).    Anoop Ty, [facility]

## 2023-12-01 NOTE — PROGRESS NOTES
Music Therapy Note    Garcia Drummond was referred by Tara Fontaine, RN.     Therapy Session  Referral Type: New referral this admission  Visit Type: New visit  Session Start Time: 1044  Session End Time: 1044  Intervention Delivery: In-person  Conflict of Service: Asleep               Treatment/Interventions       Post-assessment  Total Session Time (min): 0 minutes    Narrative  Assessment Detail: Per RN, pt attempting to take a nap.  Follow-up: MT will follow-up as applicable.    Education Documentation  No documentation found.        Expressive Therapies Note

## 2023-12-01 NOTE — DISCHARGE INSTR - OTHER ORDERS
Acute Kidney Injury Discharge Instructions    About this topic  The kidneys are bean-shaped organs in the middle of your back, just above your waist. They filter your blood to get rid of waste products and extra fluid from your body. The waste is turned into urine.    Acute kidney injury is a sudden injury or illness that causes problems with your kidneys. They shut down and stop filtering the blood. You may also hear it called acute kidney failure. Many things can cause this to happen like:  Low blood flow to the kidneys. This may happen because of low blood pressure or heart failure or other causes.  Damage to the kidneys from infections, drugs, or injuries  The path for urine to leave the body is blocked. Kidney stones, cancer, and prostate problems are some conditions that may cause a block.  Pregnancy or certain diseases may cause damage to the kidneys.  The doctor may use many tests to find out the cause of the kidney problems. Care is based on the cause and how much kidney damage there is. Most often with treatment, the kidneys will start to work on their own again. This may take weeks or months. Treatment may include drugs, dialysis, and diet changes.  What care is needed at home?  Ask your doctor what you need to do when you go home. Make sure you ask questions if you do not understand what the doctor says. This way you will know what you need to do.  Make sure to take all of the drugs ordered by your doctor.  Keep your legs at or above the level of your heart when in bed. This may help with your body's blood flow and reduce swelling in your feet.  What follow-up care is needed?  Your doctor may ask you to make visits to the office to check on your progress. Be sure to keep these visits.  What drugs may be needed?  The doctor may order drugs to:  Prevent or fight an infection  Remove body fluids  Control potassium levels in your blood  Give you more calcium in your blood  Will physical activity be  limited?  Talk to your doctor about the right amount of activity for you. Your doctor may want you to do more or less activity. If surgery was done, avoid strenuous activities and lifting heavy objects.  What changes to diet are needed?  You may have to limit how much you drink. You may also need to limit some kinds of foods to lessen the work of your healing kidneys. Your doctor may have you meet with a dietitian to help you plan your meals.    What problems could happen?  Long-term kidney failure. This is also called chronic kidney failure or chronic renal failure.  Heart problems  High blood pressure  Anemia (low blood counts)  Muscle weakness  Fluid buildup  Bleeding  Death  When do I need to call the doctor?  Signs of infection. These include a fever of 100.4°F (38°C) or higher, chills, pain with passing urine.  Swelling of your ankles, feet, or face  No urine for more than 6 hours  Sudden chest pain or breathing problems  Very bad belly pain, upset stomach, or throwing up  Very bad weakness of the legs  You are not feeling better in 2 to 3 days or you are feeling worse  Helpful tips  If you have a family member or relative with long-term kidney disease, talk to your doctor about how often you should have your kidneys checked.  Teach Back: Helping You Understand  The Teach Back Method helps you understand the information we are giving you. After you talk with the staff, tell them in your own words what you learned. This helps to make sure the staff has described each thing clearly. It also helps to explain things that may have been confusing. Before going home, make sure you can do these:  I can tell you about my condition.  I can tell you what changes I need to make with my diet, drugs, or activities.  I can tell you what I will do if I have a fever, swelling, no urine, chest pain, or trouble breathing.  Last Reviewed Date  2020-02-13

## 2023-12-01 NOTE — PROGRESS NOTES
Per medical team, patient is medically appropriate for discharge today.  to meet with patient to review discharge plan and Medicare IMM. Plan is for patient to return home today with no Care Transitions needs foreseen. Care Transitions available upon request. MAY De La Cruz

## 2023-12-01 NOTE — PROGRESS NOTES
Garcia Drummond is a 63 y.o. male on day 3 of admission presenting with CIARRA (acute kidney injury) (CMS/[facility]).      Subjective   Patient seen and examined at the bedside this morning  He is resting comfortably in bed  Feeling pretty well at this time  Shortness of breath seems to be better  Swelling is improved         Objective          Vitals 24HR  Heart Rate:  [70-78]   Temp:  [36.6 °C (97.9 °F)-36.9 °C (98.4 °F)]   Resp:  [12-19]   BP: (143-185)/(84-91)   SpO2:  [94 %-96 %]         Intake/Output last 3 Shifts:    Intake/Output Summary (Last 24 hours) at 12/1/2023 0854  Last data filed at 12/1/2023 0723  Gross per 24 hour   Intake 1381.58 ml   Output 4425 ml   Net -3043.42 ml       Physical Exam  Constitutional:       Appearance: Normal appearance.   HENT:      Head: Normocephalic and atraumatic.      Right Ear: External ear normal.      Left Ear: External ear normal.      Nose: Nose normal.      Mouth/Throat:      Mouth: Mucous membranes are moist.      Pharynx: Oropharynx is clear.   Eyes:      Extraocular Movements: Extraocular movements intact.      Conjunctiva/sclera: Conjunctivae normal.      Pupils: Pupils are equal, round, and reactive to light.   Cardiovascular:      Rate and Rhythm: Normal rate and regular rhythm.   Pulmonary:      Effort: Pulmonary effort is normal.      Breath sounds: Normal breath sounds.   Abdominal:      General: Abdomen is flat.      Palpations: Abdomen is soft.   Musculoskeletal:      Comments: Edema of the lower extremities has pretty much resolved   Skin:     General: Skin is warm and dry.   Neurological:      General: No focal deficit present.      Mental Status: He is alert and oriented to person, place, and time.   Psychiatric:         Mood and Affect: Mood normal.         Behavior: Behavior normal.       Scheduled medications  apixaban, 5 mg, oral, BID  aspirin, 81 mg, oral, Daily  atorvastatin, 40 mg, oral, Nightly  calcium carbonate-vitamin D3, 1 tablet, oral,  Daily  carvedilol, 12.5 mg, oral, BID  cholecalciferol, 5,000 Units, oral, Daily  cyanocobalamin, 1,000 mcg, oral, Daily  fluticasone, 2 spray, Each Nostril, Daily  insulin glargine, 76 Units, subcutaneous, q24h  insulin lispro, 0-5 Units, subcutaneous, TID with meals  levothyroxine, 150 mcg, oral, Daily  loratadine, 10 mg, oral, Daily  magnesium oxide, 400 mg, oral, TID  multivitamin with minerals, 1 tablet, oral, Daily  mycophenolate, 500 mg, oral, BID  NIFEdipine ER, 30 mg, oral, Daily  pantoprazole, 40 mg, oral, Daily before breakfast  polyethylene glycol, 17 g, oral, Daily  predniSONE, 5 mg, oral, Daily  sodium bicarbonate, 1,300 mg, oral, BID  tacrolimus, 1 mg, oral, q12h  tamsulosin, 0.4 mg, oral, Daily      Continuous medications  furosemide, 10 mg/hr, Last Rate: 10 mg/hr (11/30/23 3682)      PRN medications  PRN medications: acetaminophen **OR** acetaminophen **OR** acetaminophen, albuterol, ALPRAZolam, dextrose 10 % in water (D10W), dextrose, glucagon, ondansetron ODT **OR** ondansetron    Relevant Results               Assessment/Plan              Principal Problem:    CIARRA (acute kidney injury) (CMS/[facility])  Active Problems:    Diabetes type 2, controlled (CMS/[facility])    DVT (deep venous thrombosis) (CMS/[facility])    Hyperlipidemia    Hypertension    Coronary artery disease involving native coronary artery of native heart    hortness of breath: Improved  Bilateral pleural effusions  Renal Transplant July 25, 2018       Acute Rejection treated with IVIG and Steroids June 2021  Membranous Glomerulonephropathy  Immunocompromised State  HTN  DM II on Insulin  Hypothyroidism  Obesity  Nicotine use   CKD with Creat ranging 1.5-1.7 in past but now creatinine is elevated  Acute renal failure  HLD with Hypertriglyceridemia.   DVT and has now stopped his Eliquis  Proteinuria  Hyperuricemia    Plan:  At this time we will discontinue his Lasix drip  I will change him over to oral Bumex and we will increase his dose of  Bumex  His swelling seems to be much improved his renal function is fairly stable his respiratory status also feels better from his standpoint  If he does well getting up and around this morning then he can be discharged home from my standpoint  Follow-up with me in the office late next week or early the following week  We will get blood work before we see him  Please call with issues or needs           Luis Daniel Selby, DO

## 2023-12-01 NOTE — NURSING NOTE
Discharge instructions reviewed with pt and wife.  Printed and verbal education given on kidney injury and chf.  Pt and wife verbalized understanding of all education and instructions.  Discharged via wheelchair with wife to private vehicle.

## 2023-12-01 NOTE — CARE PLAN
The patient's goals for the shift include no shortness of breath    The clinical goals for the shift include less shortness of breath with activity by end of shift,.    Over the shift, the patient did not make progress toward the following goals. Barriers to progression include . Recommendations to address these barriers include   .

## 2023-12-01 NOTE — DISCHARGE SUMMARY
Discharge Diagnosis  CIARRA (acute kidney injury) (CMS/[facility])    Issues Requiring Follow-Up  CIARRA    Discharge Meds     Your medication list        START taking these medications        Instructions Last Dose Given Next Dose Due   cholecalciferol 5,000 Units tablet  Commonly known as: Vitamin D-3  Start taking on: December 2, 2023      Take 1 tablet (5,000 Units) by mouth once daily. Do not start before December 2, 2023.       magnesium oxide 400 mg (241.3 mg magnesium) tablet  Commonly known as: Mag-Ox      Take 1 tablet (400 mg) by mouth 3 times a day.              CHANGE how you take these medications        Instructions Last Dose Given Next Dose Due   bumetanide 2 mg tablet  Commonly known as: Bumex  What changed:   medication strength  how much to take  when to take this      Take 1 tablet (2 mg) by mouth every 12 hours.              CONTINUE taking these medications        Instructions Last Dose Given Next Dose Due   Accu-Chek SmartView Test Strip strip  Generic drug: blood sugar diagnostic      USE 3 TIMES DAILY       albuterol 90 mcg/actuation inhaler           ALPRAZolam 0.5 mg tablet  Commonly known as: Xanax           aspirin 81 mg chewable tablet           atorvastatin 40 mg tablet  Commonly known as: Lipitor           Breztri Aerosphere 160-9-4.8 mcg/actuation HFA aerosol inhaler  Generic drug: budesonide-glycopyr-formoterol           calcium carbonate 500 mg calcium (1,250 mg) tablet  Commonly known as: Oscal           carvedilol 12.5 mg tablet  Commonly known as: Coreg      TAKE 1 TABLET BY MOUTH TWICE  DAILY       cyanocobalamin 1,000 mcg tablet  Commonly known as: Vitamin B-12           Eliquis 5 mg tablet  Generic drug: apixaban           fish oil concentrate 120-180 mg capsule  Commonly known as: Omega-3           HumaLOG KwikPen Insulin 100 unit/mL injection  Generic drug: insulin lispro      INJECT SUBCUTANEOUSLY 20  UNITS BEFORE MEALS       insulin degludec 200 unit/mL (3 mL) injection  Commonly  known as: Tresiba FlexTouch U-200      Inject 84 Units under the skin once daily at bedtime.       levocetirizine 5 mg tablet  Commonly known as: Xyzal           levothyroxine 150 mcg tablet  Commonly known as: Synthroid, Levoxyl      Take 1 tablet (150 mcg) by mouth once daily.       mometasone 50 mcg/actuation nasal spray  Commonly known as: Nasonex           multivitamin tablet           mycophenolate 250 mg capsule  Commonly known as: Cellcept      Take 2 capsules (500 mg) by mouth 2 times a day.       NIFEdipine ER 30 mg 24 hr tablet  Commonly known as: Adalat CC           omeprazole 20 mg DR capsule  Commonly known as: PriLOSEC           predniSONE 5 mg tablet  Commonly known as: Deltasone           sodium bicarbonate 650 mg tablet           tacrolimus 1 mg capsule  Commonly known as: Prograf           tamsulosin 0.4 mg 24 hr capsule  Commonly known as: Flomax      TAKE 1 CAPSULE BY MOUTH  DAILY              STOP taking these medications      Aranesp (in polysorbate) 60 mcg/mL injection  Generic drug: darbepoetin oneyda in polysorbat        magnesium gluconate 27.5 mg magne- sium (500 mg) tablet  Commonly known as: Magonate                  Where to Get Your Medications        These medications were sent to [facility]  [address] [place] [address]      Hours: 8 AM to 5:30 Mon-Fri, 8 AM to 4 PM Saturday and Sunday Phone: [phone]   bumetanide 2 mg tablet  cholecalciferol 5,000 Units tablet  magnesium oxide 400 mg (241.3 mg magnesium) tablet         Test Results Pending At Discharge  Pending Labs       Order Current Status    Tacrolimus level In process            Hospital Course   Garcia Drummond is a 63 y.o. male presenting with the above.  Patient has a history of transplant, and for the last 3 days has been coughing and getting more more short of breath.  He is also been gaining weight and holding onto fluid he states.  The patient was told that he may need another renal transplant.  He  had a recent heart catheterization that was normal.  He denies any chest pain, nausea, vomiting, and or diaphoresis.  No shortness of breath gets worse with activity.  His past medical history is consistent with end-stage renal disease posttransplant, obesity, hypertension, hyperlipidemia, COPD, coronary artery disease, type 2 diabetes mellitus, obstructive sleep apnea, and previous DVT.  He states he is comfortable while at rest.  He presents at this time for further evaluation.     Chest x-ray done and showed mild bibasilar infiltrates as well as small bilateral pleural effusions.  Labs at discharge showed BUN 55 with creatinine 3.14.  H&H 8.1 and 25.9    Patient was seen by nephrology and recommended placing on a Lasix drip.  Lasix drip DC'd and patient started on Bumex.  Patient concern is not voided since discontinuation of Lasix drip.  Meds adjusted per nephrology.  Vitamin D was found to be low and started on vitamin D replacement    Patient is in fair condition to discharge home.  Resume home meds with following changes and Bumex and magnesium oxide.  E scribed to  pharmacy Bumex 2 mg every 12 hours, vitamin D daily, and magnesium oxide 400 mg 3 times a day.  Patient to follow-up with Dr. Selby in 1 week.  Patient to follow-up with PCP in 2 weeks    Pertinent Physical Exam At Time of Discharge  Physical Exam  General Appearance: AAO x 3, not in acute distress  Skin: skin color pink, warm, and dry; no suspicious rashes or lesions  Eyes : PERRL, EOM's intact  ENT: mucous membranes pink and moist  Neck: normocephalic  Respiratory: lungs clear to auscultation anteriorly; no wheezing, rhonchi, or crackles.   Heart: regular rate and rhythm.   Abdomen: Nondistended, positive bowel sounds x4, soft,  nontender.  Patient complains of abdominal bloating  Extremities: Nonpitting edema noted to the left lower extremity. and ankle.  Left greater than right. improved  Peripheral pulses: normal x4 extremities  Neuro:  alert, coherent and conversant, no focal motor deficits  Outpatient Follow-Up  Future Appointments   Date Time Provider Department [place]   12/11/2023 10:30 AM Luis Daniel Selby DO [id number] [facility]   12/21/2023  8:20 AM Sabrina Coto DO [id number] [facility]   2/13/2024  9:30 AM Luis Daniel Selby DO [id number] [facility]   6/3/2024 11:00 AM DARYA De Paz [address]         DARYA Burdick

## 2023-12-04 NOTE — PROGRESS NOTES
Discharge Facility: [facility]  Discharge Diagnosis: CIARRA  Admission Date: 11/28/2023  Discharge Date: 12/1/2023    PCP Appointment Date:  -Next appt 12/21/2023 0820; pt declines sooner appt at this time    Specialist Appointment Date:   -12/11/2023 1030 Dr. Selby nephrology    Hospital Encounter and Summary: Linked    See discharge assessment below for further details    Engagement  Call Start Time: 1002 (12/4/2023 10:03 AM)    Medications  Medications reviewed with patient/caregiver?: Yes (new prescriptions reviewed; cholecalciferol, bumetanide, magnesium oxide) (12/4/2023 10:03 AM)  Is the patient having any side effects they believe may be caused by any medication additions or changes?: No (12/4/2023 10:03 AM)  Does the patient have all medications ordered at discharge?: Yes (12/4/2023 10:03 AM)  Care Management Interventions: No intervention needed (12/4/2023 10:03 AM)  Is the patient taking all medications as directed (includes completed medication regime)?: Yes (12/4/2023 10:03 AM)    Appointments  Does the patient have a primary care provider?: Yes (12/4/2023 10:03 AM)    Self Management  Has home health visited the patient within 72 hours of discharge?: Not applicable (12/4/2023 10:03 AM)    Patient Teaching  Does the patient have access to their discharge instructions?: Yes (12/4/2023 10:03 AM)  Care Management Interventions: Reviewed instructions with patient (12/4/2023 10:03 AM)  What is the patient's perception of their health status since discharge?: Improving (12/4/2023 10:03 AM)  Is the patient/caregiver able to teach back the hierarchy of who to call/visit for symptoms/problems? PCP, Specialist, Home Health nurse, Urgent Care, ED, 911: Yes (12/4/2023 10:03 AM)    Wrap Up  Wrap Up Additional Comments: Pt was admitted to [facility] 11/8-12/1/2023 for shortness of breath, weight gain and cough. Pt dx with acute kidney injury. Pt reports he is feeling better everyday. Pt discharged with new  prescriptions- vitamin D3, bumetanide, and magnesium oxide. Pt reports he was able to get medication from pharmacy and denies any issues with prescriptions. Pt reports understanding of discharge instructions. Verified next PCP appt 12/21/2023 0820; pt declines sooner appt at this time. Pt has follow up appt with nephrologist Dr. Selby 12/11/2023 1030. Pt denies any questions, needs, or concerns at this time. Pt encouraged to call if questions arise. (12/4/2023 10:03 AM)  Call End Time: 1005 (12/4/2023 10:03 AM)

## 2023-12-11 NOTE — ASSESSMENT & PLAN NOTE
Renal Function stable  Has been seen by transplant  On IS  Volume status stable  BP at goal  On statin  Had Repeat UTI with SGLT2

## 2023-12-11 NOTE — PROGRESS NOTES
Subjective   He still feesl SOB  About the same  No swelling  Has cut down Bumex to 0.5 daily    Patient ID: Gacria Drummond is a 63 y.o. male who presents for Follow-up (Hospital).  HPI  He is here for hospital follow-up  He was at the hospital secondary to shortness of breath and he underwent aggressive diuresis in the hospital  He has advanced kidney dysfunction with a renal transplant  His hemoglobin is up to 8.  8 with labs drawn on 7 December  His metabolic panel shows a sodium of 146 chloride 109 bicarb 27 BUN 47 with a creatinine of 3.12 estimated GFR is 21 calcium 8.5 albumin 3.4 total protein 5.4  Magnesium is on the low side at 1.56 his tacrolimus level is good at 6.9    Review of Systems   Respiratory:  Positive for shortness of breath.    All other systems reviewed and are negative.      Objective   Physical Exam  Vitals reviewed.   Constitutional:       Appearance: Normal appearance.   HENT:      Head: Normocephalic and atraumatic.      Right Ear: External ear normal.      Left Ear: External ear normal.      Nose: Nose normal.      Mouth/Throat:      Mouth: Mucous membranes are moist.      Pharynx: Oropharynx is clear.   Eyes:      Extraocular Movements: Extraocular movements intact.      Conjunctiva/sclera: Conjunctivae normal.      Pupils: Pupils are equal, round, and reactive to light.   Cardiovascular:      Rate and Rhythm: Normal rate and regular rhythm.   Pulmonary:      Effort: Pulmonary effort is normal.      Breath sounds: Normal breath sounds.   Abdominal:      General: Abdomen is flat.      Palpations: Abdomen is soft.   Skin:     General: Skin is warm and dry.   Neurological:      General: No focal deficit present.      Mental Status: He is alert and oriented to person, place, and time.   Psychiatric:         Mood and Affect: Mood normal.         Behavior: Behavior normal.         Assessment/Plan   Problem List Items Addressed This Visit             ICD-10-CM    Diabetes type 2, controlled  (CMS/[facility]) E11.9    History of kidney transplant Z94.0    Hyperlipidemia E78.5    Hypertension I10    Proteinuria R80.9    Rejection of transplanted organ T86.91    Anemia in CKD (chronic kidney disease) N18.9, D63.1     Getting MONA Therapy         CKD (chronic kidney disease) stage 4, GFR 15-29 ml/min (CMS/[facility]) - Primary N18.4     Renal Function stable  Has been seen by transplant  On IS  Volume status stable  BP at goal  On statin  Had Repeat UTI with SGLT2         Relevant Orders    CBC    Comprehensive metabolic panel    Magnesium    PTH, intact    Phosphorus    Tacrolimus level    Follow Up In Nephrology    SERRANO (dyspnea on exertion) R06.09     Shortness of breath: Improved  Bilateral pleural effusions  Renal Transplant July 25, 2018       Acute Rejection treated with IVIG and Steroids June 2021  Membranous Glomerulonephropathy  Immunocompromised State  HTN  DM II on Insulin  Hypothyroidism  Obesity  Nicotine use   CKD with Creat ranging 1.5-1.7 in past but now creatinine is elevated  Acute renal failure  HLD with Hypertriglyceridemia.   DVT and has now stopped his Eliquis  Proteinuria  Hyperuricemia       Luis Daniel Selby DO 12/11/23 11:03 AM

## 2023-12-21 PROBLEM — F33.9 DEPRESSION, RECURRENT (CMS-HCC): Status: ACTIVE | Noted: 2023-01-01

## 2023-12-21 PROBLEM — E66.01 OBESITY, MORBID (MULTI): Status: ACTIVE | Noted: 2023-01-01

## 2023-12-21 NOTE — PROGRESS NOTES
"Subjective   Patient ID: Garcia Drummond is a 63 y.o. male who presents for Follow-up (3 MONTH/Pt states its been a bad couple of months; he has been in and out of the hospital/States he still can't breath ).  HPI  Patient is here today for 3 mo follow up.  Pt has unfortunately been in and out of the hospital the last few months due to sob. Last admission was found to be anemic, had a recent heart cath that showed mild main vessel Cad and severe branch vessel, proximal with 80-90% stenosis, recommend continued medical management,  CXR showed mild bibasilar infiltrates with small bilateral pleural effusions. CR in creased to 3.134 and hgb 8.1 . He was admitted and put on a lasix drip. He states that he feels like his sob is worse than it was now.   He is following with Dr Selby and renal transplant.   Patient is getting dabepoetin for low hgb. Hgb last 8.8.   A1c was 6.9%    Review of Systems   Constitutional:  Positive for fatigue.   Respiratory:  Positive for shortness of breath. Negative for cough.    Cardiovascular:  Positive for leg swelling. Negative for chest pain.   Gastrointestinal:  Negative for abdominal distention, rectal pain and vomiting.       Objective   /68   Pulse 77   Ht 1.803 m (5' 11\")   Wt 134 kg (296 lb)   BMI 41.28 kg/m²     Physical Exam  Constitutional:       General: He is not in acute distress.     Appearance: Normal appearance.   HENT:      Head: Normocephalic.      Nose: Nose normal.      Mouth/Throat:      Pharynx: No oropharyngeal exudate.   Eyes:      General:         Right eye: No discharge.         Left eye: No discharge.      Extraocular Movements: Extraocular movements intact.      Pupils: Pupils are equal, round, and reactive to light.   Cardiovascular:      Rate and Rhythm: Normal rate and regular rhythm.      Heart sounds: No murmur heard.     No gallop.   Pulmonary:      Effort: Pulmonary effort is normal. No respiratory distress.      Breath sounds: Normal breath " sounds. No wheezing.      Comments: Diminished at the bases   Musculoskeletal:         General: No swelling. Normal range of motion.      Right lower leg: Edema present.      Left lower leg: Edema present.      Comments: +2 pitting edema bilateral lower legs    Skin:     General: Skin is warm and dry.      Coloration: Skin is not jaundiced.   Neurological:      General: No focal deficit present.      Mental Status: He is alert and oriented to person, place, and time.      Cranial Nerves: No cranial nerve deficit.   Psychiatric:         Mood and Affect: Mood normal.         Behavior: Behavior normal.           Assessment/Plan   Problem List Items Addressed This Visit       Diabetes type 2, controlled (CMS/[facility])    DVT (deep venous thrombosis) (CMS/[facility])    Hyperlipidemia    Hypertension    Hypothyroidism - Primary    Relevant Orders    TSH with reflex to Free T4 if abnormal    COPD (chronic obstructive pulmonary disease) (CMS/[facility])    Insomnia    Relevant Medications    ALPRAZolam (Xanax) 0.5 mg tablet    Rejection of transplanted organ    Anemia in CKD (chronic kidney disease)    CKD (chronic kidney disease) stage 4, GFR 15-29 ml/min (CMS/[facility])    Coronary artery disease involving native coronary artery of native heart    Depression, recurrent (CMS/[facility])    Obesity, morbid (CMS/[facility])   1. Hospital admission for CIARRA on CKD Stage IV, volume overload, sob   - was admitted and put on lasix drip, was getting better but now getting worse   - continue bumex, increase to 2mg for today, tomorrow and maybe Saturday   - doing daily weights    - thinks he has gained 6-7 lbs since being dc from hospital     2. DMII   = on 5mg of prednisone daily   - is doing 85 units of Levemir qhs  - continue Humalog   - repeat A1c 6.9%    - trulicity did not tolerate      3. Transplanted kidney,  donor mediated rejection, pending transplant list placement  - following with transplant nephrology  - on prednisone, CellCept and tacrolimus  - continuing  to watch kidney function      4. Anxiety, insomnia   - uses xanax prn, refilled   - I have personally reviewed this patient's OARRS report and found it to be appropriate. This has rohini uploaded into the medical record. I have considered the risks of abuse, addiction, dependency and diversion and feel that it is clinically appropriate for this patient to be prescribed this controlled substance medication.        5.  Anemia, probably of Chronic disease from CKD   - on darbopoetin      8. Hypothyroidism  - tsh 31 in oct, will repeat now   - continue synthroid     Advised pt if sob is continuing to get worse to please let Dr Selby know, may need to start dialysis if kidney function and transplant rejection continues to worsen with volume overload.   Final diagnoses:   [E03.9] Acquired hypothyroidism   [F51.01] Primary insomnia   [F33.9] Depression, recurrent (CMS/[facility])   [E66.01] Obesity, morbid (CMS/[facility])   [J41.0] Simple chronic bronchitis (CMS/[facility])   [T86.91] Rejection of transplanted organ   [I10] Primary hypertension   [E78.2] Mixed hyperlipidemia   [I25.10] Coronary artery disease involving native coronary artery of native heart without angina pectoris   [I82.4Z9] Deep vein thrombosis (DVT) of distal vein of lower extremity, unspecified chronicity, unspecified laterality (CMS/[facility])   [E11.22, N18.30, Z79.4] Controlled type 2 diabetes mellitus with stage 3 chronic kidney disease, with long-term current use of insulin (CMS/[facility])   [N18.4] CKD (chronic kidney disease) stage 4, GFR 15-29 ml/min (CMS/[facility])   [N18.4, D63.1] Anemia in stage 4 chronic kidney disease (CMS/[facility])

## 2023-12-26 NOTE — PROGRESS NOTES
Call regarding appt. with PCP on 12/21/2023 after hospitalization.  At time of outreach call the patient feels as if their condition has improved since last visit.  Reviewed the PCP appointment with the pt and addressed any questions or concerns.  Pt reports he has all of his medication. Pt denies any questions, needs, or concerns at this time. He is encouraged to call if questions or needs arise.

## 2024-01-01 ENCOUNTER — INFUSION (OUTPATIENT)
Dept: HEMATOLOGY/ONCOLOGY | Facility: CLINIC | Age: 64
End: 2024-01-01
Payer: MEDICARE

## 2024-01-01 ENCOUNTER — LAB (OUTPATIENT)
Dept: LAB | Facility: LAB | Age: 64
End: 2024-01-01
Payer: MEDICARE

## 2024-01-01 ENCOUNTER — HOSPITAL ENCOUNTER (EMERGENCY)
Facility: HOSPITAL | Age: 64
Discharge: HOME | End: 2024-03-01
Attending: EMERGENCY MEDICINE
Payer: MEDICARE

## 2024-01-01 ENCOUNTER — DOCUMENTATION (OUTPATIENT)
Dept: TRANSPLANT | Facility: HOSPITAL | Age: 64
End: 2024-01-01

## 2024-01-01 ENCOUNTER — DOCUMENTATION (OUTPATIENT)
Dept: CARDIOLOGY | Facility: CLINIC | Age: 64
End: 2024-01-01
Payer: MEDICARE

## 2024-01-01 ENCOUNTER — APPOINTMENT (OUTPATIENT)
Dept: RADIOLOGY | Facility: HOSPITAL | Age: 64
End: 2024-01-01
Payer: MEDICARE

## 2024-01-01 ENCOUNTER — PATIENT OUTREACH (OUTPATIENT)
Dept: CARE COORDINATION | Facility: CLINIC | Age: 64
End: 2024-01-01

## 2024-01-01 ENCOUNTER — APPOINTMENT (OUTPATIENT)
Dept: CARDIOLOGY | Facility: HOSPITAL | Age: 64
End: 2024-01-01
Payer: MEDICARE

## 2024-01-01 ENCOUNTER — APPOINTMENT (OUTPATIENT)
Dept: NEPHROLOGY | Facility: CLINIC | Age: 64
End: 2024-01-01
Payer: MEDICARE

## 2024-01-01 ENCOUNTER — OFFICE VISIT (OUTPATIENT)
Dept: NEPHROLOGY | Facility: CLINIC | Age: 64
End: 2024-01-01
Payer: MEDICARE

## 2024-01-01 ENCOUNTER — LAB REQUISITION (OUTPATIENT)
Dept: LAB | Facility: CLINIC | Age: 64
End: 2024-01-01
Payer: MEDICARE

## 2024-01-01 ENCOUNTER — DOCUMENTATION (OUTPATIENT)
Dept: TRANSPLANT | Facility: HOSPITAL | Age: 64
End: 2024-01-01
Payer: MEDICARE

## 2024-01-01 ENCOUNTER — APPOINTMENT (OUTPATIENT)
Dept: HEMATOLOGY/ONCOLOGY | Facility: CLINIC | Age: 64
End: 2024-01-01
Payer: MEDICARE

## 2024-01-01 ENCOUNTER — TELEPHONE (OUTPATIENT)
Dept: NEPHROLOGY | Facility: CLINIC | Age: 64
End: 2024-01-01
Payer: MEDICARE

## 2024-01-01 VITALS
SYSTOLIC BLOOD PRESSURE: 138 MMHG | RESPIRATION RATE: 18 BRPM | HEIGHT: 72 IN | DIASTOLIC BLOOD PRESSURE: 74 MMHG | WEIGHT: 285 LBS | BODY MASS INDEX: 38.6 KG/M2 | TEMPERATURE: 98.2 F | OXYGEN SATURATION: 98 % | HEART RATE: 70 BPM

## 2024-01-01 VITALS
HEART RATE: 68 BPM | RESPIRATION RATE: 20 BRPM | DIASTOLIC BLOOD PRESSURE: 55 MMHG | BODY MASS INDEX: 41.05 KG/M2 | WEIGHT: 294.31 LBS | OXYGEN SATURATION: 99 % | SYSTOLIC BLOOD PRESSURE: 128 MMHG | TEMPERATURE: 97 F

## 2024-01-01 VITALS
TEMPERATURE: 96.1 F | WEIGHT: 285.5 LBS | SYSTOLIC BLOOD PRESSURE: 96 MMHG | OXYGEN SATURATION: 96 % | BODY MASS INDEX: 39.82 KG/M2 | HEART RATE: 71 BPM | DIASTOLIC BLOOD PRESSURE: 56 MMHG | RESPIRATION RATE: 18 BRPM

## 2024-01-01 VITALS
HEART RATE: 70 BPM | BODY MASS INDEX: 41 KG/M2 | SYSTOLIC BLOOD PRESSURE: 108 MMHG | RESPIRATION RATE: 18 BRPM | WEIGHT: 294 LBS | DIASTOLIC BLOOD PRESSURE: 62 MMHG | TEMPERATURE: 96.9 F | OXYGEN SATURATION: 97 %

## 2024-01-01 VITALS
DIASTOLIC BLOOD PRESSURE: 68 MMHG | WEIGHT: 289.24 LBS | TEMPERATURE: 97.3 F | BODY MASS INDEX: 40.34 KG/M2 | OXYGEN SATURATION: 96 % | HEART RATE: 72 BPM | SYSTOLIC BLOOD PRESSURE: 114 MMHG

## 2024-01-01 VITALS
SYSTOLIC BLOOD PRESSURE: 136 MMHG | DIASTOLIC BLOOD PRESSURE: 60 MMHG | BODY MASS INDEX: 40.46 KG/M2 | HEIGHT: 71 IN | HEART RATE: 69 BPM | WEIGHT: 289 LBS

## 2024-01-01 DIAGNOSIS — N18.4 ANEMIA IN STAGE 4 CHRONIC KIDNEY DISEASE (MULTI): ICD-10-CM

## 2024-01-01 DIAGNOSIS — U07.1 COVID-19: ICD-10-CM

## 2024-01-01 DIAGNOSIS — N18.4 CKD (CHRONIC KIDNEY DISEASE) STAGE 4, GFR 15-29 ML/MIN (MULTI): ICD-10-CM

## 2024-01-01 DIAGNOSIS — Z94.0 HISTORY OF KIDNEY TRANSPLANT (HHS-HCC): ICD-10-CM

## 2024-01-01 DIAGNOSIS — D84.9 IMMUNOSUPPRESSION (MULTI): ICD-10-CM

## 2024-01-01 DIAGNOSIS — R35.0 BENIGN PROSTATIC HYPERPLASIA WITH URINARY FREQUENCY: ICD-10-CM

## 2024-01-01 DIAGNOSIS — N18.6 END STAGE RENAL DISEASE (MULTI): ICD-10-CM

## 2024-01-01 DIAGNOSIS — I10 PRIMARY HYPERTENSION: ICD-10-CM

## 2024-01-01 DIAGNOSIS — D63.1 ANEMIA IN STAGE 4 CHRONIC KIDNEY DISEASE (MULTI): Primary | ICD-10-CM

## 2024-01-01 DIAGNOSIS — R76.8 DONOR SPECIFIC ANTIBODY (DSA) POSITIVE: ICD-10-CM

## 2024-01-01 DIAGNOSIS — D63.1 ANEMIA IN STAGE 4 CHRONIC KIDNEY DISEASE (MULTI): ICD-10-CM

## 2024-01-01 DIAGNOSIS — Z01.818 PRE-TRANSPLANT EVALUATION FOR KIDNEY TRANSPLANT: Primary | ICD-10-CM

## 2024-01-01 DIAGNOSIS — T86.91 REJECTION OF TRANSPLANTED ORGAN: ICD-10-CM

## 2024-01-01 DIAGNOSIS — E03.9 ACQUIRED HYPOTHYROIDISM: ICD-10-CM

## 2024-01-01 DIAGNOSIS — Z79.4 CONTROLLED TYPE 2 DIABETES MELLITUS WITH STAGE 3 CHRONIC KIDNEY DISEASE, WITH LONG-TERM CURRENT USE OF INSULIN (MULTI): ICD-10-CM

## 2024-01-01 DIAGNOSIS — E78.5 DYSLIPIDEMIA: ICD-10-CM

## 2024-01-01 DIAGNOSIS — N18.30 CONTROLLED TYPE 2 DIABETES MELLITUS WITH STAGE 3 CHRONIC KIDNEY DISEASE, WITH LONG-TERM CURRENT USE OF INSULIN (MULTI): ICD-10-CM

## 2024-01-01 DIAGNOSIS — Z94.0 HISTORY OF KIDNEY TRANSPLANT (HHS-HCC): Primary | ICD-10-CM

## 2024-01-01 DIAGNOSIS — J02.0 STREP THROAT: ICD-10-CM

## 2024-01-01 DIAGNOSIS — Z01.818 PRE-TRANSPLANT EVALUATION FOR KIDNEY TRANSPLANT: ICD-10-CM

## 2024-01-01 DIAGNOSIS — D63.1 ANEMIA IN STAGE 5 CHRONIC KIDNEY DISEASE, NOT ON CHRONIC DIALYSIS (MULTI): ICD-10-CM

## 2024-01-01 DIAGNOSIS — N18.4 STAGE 4 CHRONIC KIDNEY DISEASE (MULTI): ICD-10-CM

## 2024-01-01 DIAGNOSIS — K21.9 GASTROESOPHAGEAL REFLUX DISEASE WITHOUT ESOPHAGITIS: ICD-10-CM

## 2024-01-01 DIAGNOSIS — G47.33 OSA (OBSTRUCTIVE SLEEP APNEA): ICD-10-CM

## 2024-01-01 DIAGNOSIS — N18.5 ANEMIA IN STAGE 5 CHRONIC KIDNEY DISEASE, NOT ON CHRONIC DIALYSIS (MULTI): ICD-10-CM

## 2024-01-01 DIAGNOSIS — I50.9 ACUTE CONGESTIVE HEART FAILURE, UNSPECIFIED HEART FAILURE TYPE (MULTI): Primary | ICD-10-CM

## 2024-01-01 DIAGNOSIS — E11.22 CONTROLLED TYPE 2 DIABETES MELLITUS WITH STAGE 3 CHRONIC KIDNEY DISEASE, WITH LONG-TERM CURRENT USE OF INSULIN (MULTI): ICD-10-CM

## 2024-01-01 DIAGNOSIS — N40.1 BENIGN PROSTATIC HYPERPLASIA WITH URINARY FREQUENCY: ICD-10-CM

## 2024-01-01 DIAGNOSIS — N18.4 ANEMIA IN STAGE 4 CHRONIC KIDNEY DISEASE (MULTI): Primary | ICD-10-CM

## 2024-01-01 LAB
ALBUMIN SERPL BCP-MCNC: 3.1 G/DL (ref 3.4–5)
ALBUMIN SERPL BCP-MCNC: 3.2 G/DL (ref 3.4–5)
ALBUMIN SERPL BCP-MCNC: 3.5 G/DL (ref 3.4–5)
ALBUMIN SERPL BCP-MCNC: 3.5 G/DL (ref 3.4–5)
ALBUMIN SERPL BCP-MCNC: 3.6 G/DL (ref 3.4–5)
ALP SERPL-CCNC: 62 U/L (ref 33–136)
ALP SERPL-CCNC: 71 U/L (ref 33–136)
ALP SERPL-CCNC: 77 U/L (ref 33–136)
ALP SERPL-CCNC: 93 U/L (ref 33–136)
ALT SERPL W P-5'-P-CCNC: 8 U/L (ref 10–52)
ALT SERPL W P-5'-P-CCNC: 9 U/L (ref 10–52)
ANION GAP SERPL CALC-SCNC: 13 MMOL/L (ref 10–20)
ANION GAP SERPL CALC-SCNC: 13 MMOL/L (ref 10–20)
ANION GAP SERPL CALC-SCNC: 14 MMOL/L (ref 10–20)
ANION GAP SERPL CALC-SCNC: 15 MMOL/L (ref 10–20)
ANION GAP SERPL CALC-SCNC: 16 MMOL/L (ref 10–20)
ANION GAP SERPL CALC-SCNC: 18 MMOL/L (ref 10–20)
AST SERPL W P-5'-P-CCNC: 10 U/L (ref 9–39)
AST SERPL W P-5'-P-CCNC: 10 U/L (ref 9–39)
AST SERPL W P-5'-P-CCNC: 11 U/L (ref 9–39)
AST SERPL W P-5'-P-CCNC: 12 U/L (ref 9–39)
BASOPHILS # BLD AUTO: 0.02 X10*3/UL (ref 0–0.1)
BASOPHILS # BLD AUTO: 0.03 X10*3/UL (ref 0–0.1)
BASOPHILS # BLD AUTO: 0.04 X10*3/UL (ref 0–0.1)
BASOPHILS # BLD AUTO: 0.05 X10*3/UL (ref 0–0.1)
BASOPHILS NFR BLD AUTO: 0.2 %
BASOPHILS NFR BLD AUTO: 0.3 %
BASOPHILS NFR BLD AUTO: 0.4 %
BASOPHILS NFR BLD AUTO: 0.5 %
BILIRUB SERPL-MCNC: 0.2 MG/DL (ref 0–1.2)
BNP SERPL-MCNC: 513 PG/ML (ref 0–99)
BUN SERPL-MCNC: 66 MG/DL (ref 6–23)
BUN SERPL-MCNC: 68 MG/DL (ref 6–23)
BUN SERPL-MCNC: 70 MG/DL (ref 6–23)
BUN SERPL-MCNC: 76 MG/DL (ref 6–23)
BUN SERPL-MCNC: 79 MG/DL (ref 6–23)
BUN SERPL-MCNC: 79 MG/DL (ref 6–23)
CALCIUM SERPL-MCNC: 7.9 MG/DL (ref 8.6–10.3)
CALCIUM SERPL-MCNC: 8.3 MG/DL (ref 8.6–10.3)
CALCIUM SERPL-MCNC: 8.4 MG/DL (ref 8.6–10.3)
CALCIUM SERPL-MCNC: 8.5 MG/DL (ref 8.6–10.3)
CALCIUM SERPL-MCNC: 8.5 MG/DL (ref 8.6–10.3)
CALCIUM SERPL-MCNC: 8.8 MG/DL (ref 8.6–10.6)
CARDIAC TROPONIN I PNL SERPL HS: 36 NG/L (ref 0–20)
CARDIAC TROPONIN I PNL SERPL HS: 37 NG/L (ref 0–20)
CHLORIDE SERPL-SCNC: 112 MMOL/L (ref 98–107)
CHLORIDE SERPL-SCNC: 113 MMOL/L (ref 98–107)
CHLORIDE SERPL-SCNC: 114 MMOL/L (ref 98–107)
CHLORIDE SERPL-SCNC: 114 MMOL/L (ref 98–107)
CHLORIDE SERPL-SCNC: 116 MMOL/L (ref 98–107)
CHLORIDE SERPL-SCNC: 117 MMOL/L (ref 98–107)
CO2 SERPL-SCNC: 19 MMOL/L (ref 21–32)
CO2 SERPL-SCNC: 19 MMOL/L (ref 21–32)
CO2 SERPL-SCNC: 20 MMOL/L (ref 21–32)
CO2 SERPL-SCNC: 21 MMOL/L (ref 21–32)
CO2 SERPL-SCNC: 22 MMOL/L (ref 21–32)
CO2 SERPL-SCNC: 24 MMOL/L (ref 21–32)
CREAT SERPL-MCNC: 3.68 MG/DL (ref 0.5–1.3)
CREAT SERPL-MCNC: 3.9 MG/DL (ref 0.5–1.3)
CREAT SERPL-MCNC: 4.47 MG/DL (ref 0.5–1.3)
CREAT SERPL-MCNC: 4.76 MG/DL (ref 0.5–1.3)
CREAT SERPL-MCNC: 4.78 MG/DL (ref 0.5–1.3)
CREAT SERPL-MCNC: 5.32 MG/DL (ref 0.5–1.3)
D DIMER PPP FEU-MCNC: 841 NG/ML FEU
EGFRCR SERPLBLD CKD-EPI 2021: 11 ML/MIN/1.73M*2
EGFRCR SERPLBLD CKD-EPI 2021: 13 ML/MIN/1.73M*2
EGFRCR SERPLBLD CKD-EPI 2021: 13 ML/MIN/1.73M*2
EGFRCR SERPLBLD CKD-EPI 2021: 14 ML/MIN/1.73M*2
EGFRCR SERPLBLD CKD-EPI 2021: 16 ML/MIN/1.73M*2
EGFRCR SERPLBLD CKD-EPI 2021: 17 ML/MIN/1.73M*2
EOSINOPHIL # BLD AUTO: 0.09 X10*3/UL (ref 0–0.7)
EOSINOPHIL # BLD AUTO: 0.22 X10*3/UL (ref 0–0.7)
EOSINOPHIL # BLD AUTO: 0.25 X10*3/UL (ref 0–0.7)
EOSINOPHIL # BLD AUTO: 0.26 X10*3/UL (ref 0–0.7)
EOSINOPHIL NFR BLD AUTO: 1.1 %
EOSINOPHIL NFR BLD AUTO: 2.4 %
EOSINOPHIL NFR BLD AUTO: 2.7 %
EOSINOPHIL NFR BLD AUTO: 2.7 %
EPO SERPL-ACNC: 25 MU/ML (ref 4–27)
ERYTHROCYTE [DISTWIDTH] IN BLOOD BY AUTOMATED COUNT: 13.2 % (ref 11.5–14.5)
ERYTHROCYTE [DISTWIDTH] IN BLOOD BY AUTOMATED COUNT: 13.2 % (ref 11.5–14.5)
ERYTHROCYTE [DISTWIDTH] IN BLOOD BY AUTOMATED COUNT: 13.4 % (ref 11.5–14.5)
ERYTHROCYTE [DISTWIDTH] IN BLOOD BY AUTOMATED COUNT: 13.5 % (ref 11.5–14.5)
ERYTHROCYTE [DISTWIDTH] IN BLOOD BY AUTOMATED COUNT: 13.5 % (ref 11.5–14.5)
ERYTHROCYTE [DISTWIDTH] IN BLOOD BY AUTOMATED COUNT: 13.8 % (ref 11.5–14.5)
FERRITIN SERPL-MCNC: 203 NG/ML (ref 20–300)
GLUCOSE SERPL-MCNC: 116 MG/DL (ref 74–99)
GLUCOSE SERPL-MCNC: 157 MG/DL (ref 74–99)
GLUCOSE SERPL-MCNC: 175 MG/DL (ref 74–99)
GLUCOSE SERPL-MCNC: 84 MG/DL (ref 74–99)
GLUCOSE SERPL-MCNC: 92 MG/DL (ref 74–99)
GLUCOSE SERPL-MCNC: 97 MG/DL (ref 74–99)
HCT VFR BLD AUTO: 27.4 % (ref 41–52)
HCT VFR BLD AUTO: 27.7 % (ref 41–52)
HCT VFR BLD AUTO: 28.5 % (ref 41–52)
HCT VFR BLD AUTO: 28.9 % (ref 41–52)
HCT VFR BLD AUTO: 28.9 % (ref 41–52)
HCT VFR BLD AUTO: 29 % (ref 41–52)
HGB BLD-MCNC: 8.2 G/DL (ref 13.5–17.5)
HGB BLD-MCNC: 8.3 G/DL (ref 13.5–17.5)
HGB BLD-MCNC: 8.4 G/DL (ref 13.5–17.5)
HGB BLD-MCNC: 8.7 G/DL (ref 13.5–17.5)
HGB BLD-MCNC: 8.7 G/DL (ref 13.5–17.5)
HGB BLD-MCNC: 8.8 G/DL (ref 13.5–17.5)
HLA RESULTS: NORMAL
HLA-A+B+C AB NFR SER: NORMAL %
HLA-DP+DQ+DR AB NFR SER: NORMAL %
IMM GRANULOCYTES # BLD AUTO: 0.03 X10*3/UL (ref 0–0.7)
IMM GRANULOCYTES # BLD AUTO: 0.05 X10*3/UL (ref 0–0.7)
IMM GRANULOCYTES NFR BLD AUTO: 0.3 % (ref 0–0.9)
IMM GRANULOCYTES NFR BLD AUTO: 0.3 % (ref 0–0.9)
IMM GRANULOCYTES NFR BLD AUTO: 0.4 % (ref 0–0.9)
IMM GRANULOCYTES NFR BLD AUTO: 0.5 % (ref 0–0.9)
IRON SATN MFR SERPL: 35 % (ref 25–45)
IRON SERPL-MCNC: 86 UG/DL (ref 35–150)
LYMPHOCYTES # BLD AUTO: 0.31 X10*3/UL (ref 1.2–4.8)
LYMPHOCYTES # BLD AUTO: 1.06 X10*3/UL (ref 1.2–4.8)
LYMPHOCYTES # BLD AUTO: 1.17 X10*3/UL (ref 1.2–4.8)
LYMPHOCYTES # BLD AUTO: 1.27 X10*3/UL (ref 1.2–4.8)
LYMPHOCYTES NFR BLD AUTO: 11.3 %
LYMPHOCYTES NFR BLD AUTO: 12.5 %
LYMPHOCYTES NFR BLD AUTO: 12.9 %
LYMPHOCYTES NFR BLD AUTO: 3.8 %
MAGNESIUM SERPL-MCNC: 1.06 MG/DL (ref 1.6–2.4)
MAGNESIUM SERPL-MCNC: 1.16 MG/DL (ref 1.6–2.4)
MCH RBC QN AUTO: 27.4 PG (ref 26–34)
MCH RBC QN AUTO: 27.7 PG (ref 26–34)
MCH RBC QN AUTO: 28.2 PG (ref 26–34)
MCH RBC QN AUTO: 28.3 PG (ref 26–34)
MCH RBC QN AUTO: 28.6 PG (ref 26–34)
MCH RBC QN AUTO: 28.8 PG (ref 26–34)
MCHC RBC AUTO-ENTMCNC: 29.1 G/DL (ref 32–36)
MCHC RBC AUTO-ENTMCNC: 29.9 G/DL (ref 32–36)
MCHC RBC AUTO-ENTMCNC: 30 G/DL (ref 32–36)
MCHC RBC AUTO-ENTMCNC: 30.1 G/DL (ref 32–36)
MCHC RBC AUTO-ENTMCNC: 30.3 G/DL (ref 32–36)
MCHC RBC AUTO-ENTMCNC: 30.5 G/DL (ref 32–36)
MCV RBC AUTO: 93 FL (ref 80–100)
MCV RBC AUTO: 94 FL (ref 80–100)
MCV RBC AUTO: 95 FL (ref 80–100)
MONOCYTES # BLD AUTO: 0.59 X10*3/UL (ref 0.1–1)
MONOCYTES # BLD AUTO: 0.63 X10*3/UL (ref 0.1–1)
MONOCYTES # BLD AUTO: 0.77 X10*3/UL (ref 0.1–1)
MONOCYTES # BLD AUTO: 0.91 X10*3/UL (ref 0.1–1)
MONOCYTES NFR BLD AUTO: 6.7 %
MONOCYTES NFR BLD AUTO: 7.2 %
MONOCYTES NFR BLD AUTO: 8.3 %
MONOCYTES NFR BLD AUTO: 9.3 %
NEUTROPHILS # BLD AUTO: 7.1 X10*3/UL (ref 1.2–7.7)
NEUTROPHILS # BLD AUTO: 7.15 X10*3/UL (ref 1.2–7.7)
NEUTROPHILS # BLD AUTO: 7.27 X10*3/UL (ref 1.2–7.7)
NEUTROPHILS # BLD AUTO: 7.38 X10*3/UL (ref 1.2–7.7)
NEUTROPHILS NFR BLD AUTO: 74.1 %
NEUTROPHILS NFR BLD AUTO: 76.1 %
NEUTROPHILS NFR BLD AUTO: 78.7 %
NEUTROPHILS NFR BLD AUTO: 87.3 %
NRBC BLD-RTO: 0 /100 WBCS (ref 0–0)
PHOSPHATE SERPL-MCNC: 4.2 MG/DL (ref 2.5–4.9)
PHOSPHATE SERPL-MCNC: 5.3 MG/DL (ref 2.5–4.9)
PLATELET # BLD AUTO: 166 X10*3/UL (ref 150–450)
PLATELET # BLD AUTO: 230 X10*3/UL (ref 150–450)
PLATELET # BLD AUTO: 232 X10*3/UL (ref 150–450)
PLATELET # BLD AUTO: 233 X10*3/UL (ref 150–450)
PLATELET # BLD AUTO: 251 X10*3/UL (ref 150–450)
PLATELET # BLD AUTO: 253 X10*3/UL (ref 150–450)
POTASSIUM SERPL-SCNC: 4.6 MMOL/L (ref 3.5–5.3)
POTASSIUM SERPL-SCNC: 4.8 MMOL/L (ref 3.5–5.3)
POTASSIUM SERPL-SCNC: 4.8 MMOL/L (ref 3.5–5.3)
POTASSIUM SERPL-SCNC: 4.9 MMOL/L (ref 3.5–5.3)
POTASSIUM SERPL-SCNC: 5 MMOL/L (ref 3.5–5.3)
POTASSIUM SERPL-SCNC: 5.1 MMOL/L (ref 3.5–5.3)
PROT SERPL-MCNC: 5.1 G/DL (ref 6.4–8.2)
PROT SERPL-MCNC: 5.3 G/DL (ref 6.4–8.2)
PROT SERPL-MCNC: 5.5 G/DL (ref 6.4–8.2)
PROT SERPL-MCNC: 5.7 G/DL (ref 6.4–8.2)
PTH-INTACT SERPL-MCNC: 229.4 PG/ML (ref 18.5–88)
RBC # BLD AUTO: 2.93 X10*6/UL (ref 4.5–5.9)
RBC # BLD AUTO: 2.96 X10*6/UL (ref 4.5–5.9)
RBC # BLD AUTO: 3.02 X10*6/UL (ref 4.5–5.9)
RBC # BLD AUTO: 3.07 X10*6/UL (ref 4.5–5.9)
RBC # BLD AUTO: 3.08 X10*6/UL (ref 4.5–5.9)
RBC # BLD AUTO: 3.09 X10*6/UL (ref 4.5–5.9)
S PYO DNA THROAT QL NAA+PROBE: NOT DETECTED
SODIUM SERPL-SCNC: 143 MMOL/L (ref 136–145)
SODIUM SERPL-SCNC: 143 MMOL/L (ref 136–145)
SODIUM SERPL-SCNC: 144 MMOL/L (ref 136–145)
SODIUM SERPL-SCNC: 145 MMOL/L (ref 136–145)
SODIUM SERPL-SCNC: 147 MMOL/L (ref 136–145)
SODIUM SERPL-SCNC: 148 MMOL/L (ref 136–145)
TACROLIMUS BLD-MCNC: 5 NG/ML
TIBC SERPL-MCNC: 243 UG/DL (ref 240–445)
UIBC SERPL-MCNC: 157 UG/DL (ref 110–370)
WBC # BLD AUTO: 8.2 X10*3/UL (ref 4.4–11.3)
WBC # BLD AUTO: 8.2 X10*3/UL (ref 4.4–11.3)
WBC # BLD AUTO: 9 X10*3/UL (ref 4.4–11.3)
WBC # BLD AUTO: 9.3 X10*3/UL (ref 4.4–11.3)
WBC # BLD AUTO: 9.4 X10*3/UL (ref 4.4–11.3)
WBC # BLD AUTO: 9.8 X10*3/UL (ref 4.4–11.3)

## 2024-01-01 PROCEDURE — 85025 COMPLETE CBC W/AUTO DIFF WBC: CPT

## 2024-01-01 PROCEDURE — 36415 COLL VENOUS BLD VENIPUNCTURE: CPT | Performed by: EMERGENCY MEDICINE

## 2024-01-01 PROCEDURE — 93005 ELECTROCARDIOGRAM TRACING: CPT

## 2024-01-01 PROCEDURE — 86833 HLA CLASS II HIGH DEFIN QUAL: CPT

## 2024-01-01 PROCEDURE — 36415 COLL VENOUS BLD VENIPUNCTURE: CPT

## 2024-01-01 PROCEDURE — 83550 IRON BINDING TEST: CPT

## 2024-01-01 PROCEDURE — 96372 THER/PROPH/DIAG INJ SC/IM: CPT

## 2024-01-01 PROCEDURE — 2500000004 HC RX 250 GENERAL PHARMACY W/ HCPCS (ALT 636 FOR OP/ED): Performed by: EMERGENCY MEDICINE

## 2024-01-01 PROCEDURE — 2500000004 HC RX 250 GENERAL PHARMACY W/ HCPCS (ALT 636 FOR OP/ED): Mod: EC | Performed by: INTERNAL MEDICINE

## 2024-01-01 PROCEDURE — 87651 STREP A DNA AMP PROBE: CPT | Performed by: EMERGENCY MEDICINE

## 2024-01-01 PROCEDURE — 71045 X-RAY EXAM CHEST 1 VIEW: CPT

## 2024-01-01 PROCEDURE — 85025 COMPLETE CBC W/AUTO DIFF WBC: CPT | Performed by: EMERGENCY MEDICINE

## 2024-01-01 PROCEDURE — 78830 RP LOCLZJ TUM SPECT W/CT 1: CPT

## 2024-01-01 PROCEDURE — 1160F RVW MEDS BY RX/DR IN RCRD: CPT | Performed by: INTERNAL MEDICINE

## 2024-01-01 PROCEDURE — 83735 ASSAY OF MAGNESIUM: CPT

## 2024-01-01 PROCEDURE — 80053 COMPREHEN METABOLIC PANEL: CPT

## 2024-01-01 PROCEDURE — A9540 TC99M MAA: HCPCS | Performed by: EMERGENCY MEDICINE

## 2024-01-01 PROCEDURE — 2500000004 HC RX 250 GENERAL PHARMACY W/ HCPCS (ALT 636 FOR OP/ED): Mod: JW | Performed by: INTERNAL MEDICINE

## 2024-01-01 PROCEDURE — 80053 COMPREHEN METABOLIC PANEL: CPT | Performed by: EMERGENCY MEDICINE

## 2024-01-01 PROCEDURE — 99214 OFFICE O/P EST MOD 30 MIN: CPT | Performed by: INTERNAL MEDICINE

## 2024-01-01 PROCEDURE — 86832 HLA CLASS I HIGH DEFIN QUAL: CPT

## 2024-01-01 PROCEDURE — 96372 THER/PROPH/DIAG INJ SC/IM: CPT | Performed by: INTERNAL MEDICINE

## 2024-01-01 PROCEDURE — 3008F BODY MASS INDEX DOCD: CPT | Performed by: INTERNAL MEDICINE

## 2024-01-01 PROCEDURE — 84484 ASSAY OF TROPONIN QUANT: CPT | Performed by: EMERGENCY MEDICINE

## 2024-01-01 PROCEDURE — 83540 ASSAY OF IRON: CPT

## 2024-01-01 PROCEDURE — 80069 RENAL FUNCTION PANEL: CPT

## 2024-01-01 PROCEDURE — 3078F DIAST BP <80 MM HG: CPT | Performed by: INTERNAL MEDICINE

## 2024-01-01 PROCEDURE — 1126F AMNT PAIN NOTED NONE PRSNT: CPT | Performed by: INTERNAL MEDICINE

## 2024-01-01 PROCEDURE — 3430000001 HC RX 343 DIAGNOSTIC RADIOPHARMACEUTICALS: Performed by: EMERGENCY MEDICINE

## 2024-01-01 PROCEDURE — 2500000004 HC RX 250 GENERAL PHARMACY W/ HCPCS (ALT 636 FOR OP/ED): Mod: JZ | Performed by: INTERNAL MEDICINE

## 2024-01-01 PROCEDURE — 83735 ASSAY OF MAGNESIUM: CPT | Performed by: EMERGENCY MEDICINE

## 2024-01-01 PROCEDURE — 85379 FIBRIN DEGRADATION QUANT: CPT | Performed by: EMERGENCY MEDICINE

## 2024-01-01 PROCEDURE — 1159F MED LIST DOCD IN RCRD: CPT | Performed by: INTERNAL MEDICINE

## 2024-01-01 PROCEDURE — 83970 ASSAY OF PARATHORMONE: CPT

## 2024-01-01 PROCEDURE — 96366 THER/PROPH/DIAG IV INF ADDON: CPT

## 2024-01-01 PROCEDURE — 82668 ASSAY OF ERYTHROPOIETIN: CPT

## 2024-01-01 PROCEDURE — 99285 EMERGENCY DEPT VISIT HI MDM: CPT | Mod: 25

## 2024-01-01 PROCEDURE — 96365 THER/PROPH/DIAG IV INF INIT: CPT | Mod: 59

## 2024-01-01 PROCEDURE — 84100 ASSAY OF PHOSPHORUS: CPT

## 2024-01-01 PROCEDURE — 85027 COMPLETE CBC AUTOMATED: CPT

## 2024-01-01 PROCEDURE — 3075F SYST BP GE 130 - 139MM HG: CPT | Performed by: INTERNAL MEDICINE

## 2024-01-01 PROCEDURE — 1036F TOBACCO NON-USER: CPT | Performed by: INTERNAL MEDICINE

## 2024-01-01 PROCEDURE — 80048 BASIC METABOLIC PNL TOTAL CA: CPT

## 2024-01-01 PROCEDURE — 71045 X-RAY EXAM CHEST 1 VIEW: CPT | Performed by: RADIOLOGY

## 2024-01-01 PROCEDURE — 82728 ASSAY OF FERRITIN: CPT

## 2024-01-01 PROCEDURE — 96375 TX/PRO/DX INJ NEW DRUG ADDON: CPT | Mod: 59

## 2024-01-01 PROCEDURE — 83880 ASSAY OF NATRIURETIC PEPTIDE: CPT | Performed by: EMERGENCY MEDICINE

## 2024-01-01 PROCEDURE — 80197 ASSAY OF TACROLIMUS: CPT

## 2024-01-01 PROCEDURE — 78830 RP LOCLZJ TUM SPECT W/CT 1: CPT | Performed by: RADIOLOGY

## 2024-01-01 RX ORDER — EPINEPHRINE 0.3 MG/.3ML
0.3 INJECTION SUBCUTANEOUS EVERY 5 MIN PRN
Status: CANCELLED | OUTPATIENT
Start: 2024-01-01

## 2024-01-01 RX ORDER — DIPHENHYDRAMINE HYDROCHLORIDE 50 MG/ML
50 INJECTION INTRAMUSCULAR; INTRAVENOUS AS NEEDED
Status: CANCELLED | OUTPATIENT
Start: 2024-01-01

## 2024-01-01 RX ORDER — CARVEDILOL 12.5 MG/1
TABLET ORAL
Qty: 200 TABLET | Refills: 2 | Status: SHIPPED | OUTPATIENT
Start: 2024-01-01 | End: 2024-03-09

## 2024-01-01 RX ORDER — LEVOTHYROXINE SODIUM 175 UG/1
175 TABLET ORAL DAILY
Qty: 90 TABLET | Refills: 1 | Status: SHIPPED | OUTPATIENT
Start: 2024-01-01 | End: 2024-03-09

## 2024-01-01 RX ORDER — LEVOTHYROXINE SODIUM 175 UG/1
175 TABLET ORAL DAILY
Qty: 90 TABLET | Refills: 1 | Status: SHIPPED | OUTPATIENT
Start: 2024-01-01 | End: 2024-01-01 | Stop reason: SDUPTHER

## 2024-01-01 RX ORDER — NIFEDIPINE 30 MG/1
30 TABLET, FILM COATED, EXTENDED RELEASE ORAL DAILY
Qty: 90 TABLET | Refills: 3 | Status: SHIPPED | OUTPATIENT
Start: 2024-01-01 | End: 2024-03-09

## 2024-01-01 RX ORDER — ALBUTEROL SULFATE 0.83 MG/ML
3 SOLUTION RESPIRATORY (INHALATION) AS NEEDED
Status: CANCELLED | OUTPATIENT
Start: 2024-01-01

## 2024-01-01 RX ORDER — PREDNISONE 5 MG/1
5 TABLET ORAL DAILY
Qty: 90 TABLET | Refills: 3 | Status: SHIPPED | OUTPATIENT
Start: 2024-01-01 | End: 2024-03-09

## 2024-01-01 RX ORDER — TAMSULOSIN HYDROCHLORIDE 0.4 MG/1
CAPSULE ORAL
Qty: 90 CAPSULE | Refills: 3 | Status: SHIPPED | OUTPATIENT
Start: 2024-01-01 | End: 2024-03-09

## 2024-01-01 RX ORDER — FAMOTIDINE 10 MG/ML
20 INJECTION INTRAVENOUS ONCE AS NEEDED
Status: CANCELLED | OUTPATIENT
Start: 2024-01-01

## 2024-01-01 RX ORDER — MAGNESIUM SULFATE HEPTAHYDRATE 40 MG/ML
2 INJECTION, SOLUTION INTRAVENOUS ONCE
Status: COMPLETED | OUTPATIENT
Start: 2024-01-01 | End: 2024-01-01

## 2024-01-01 RX ORDER — TACROLIMUS 1 MG/1
1 CAPSULE ORAL EVERY 12 HOURS
Qty: 200 CAPSULE | Refills: 2 | Status: SHIPPED | OUTPATIENT
Start: 2024-01-01 | End: 2024-03-09

## 2024-01-01 RX ORDER — AMOXICILLIN 875 MG/1
875 TABLET, FILM COATED ORAL 2 TIMES DAILY
Qty: 20 TABLET | Refills: 0 | Status: SHIPPED | OUTPATIENT
Start: 2024-01-01 | End: 2024-03-09

## 2024-01-01 RX ORDER — OMEPRAZOLE 20 MG/1
20 CAPSULE, DELAYED RELEASE ORAL DAILY
Qty: 100 CAPSULE | Refills: 2 | Status: SHIPPED | OUTPATIENT
Start: 2024-01-01 | End: 2024-03-09

## 2024-01-01 RX ORDER — LEVOTHYROXINE SODIUM 175 UG/1
175 TABLET ORAL DAILY
Qty: 90 TABLET | Refills: 3 | Status: CANCELLED | OUTPATIENT
Start: 2024-01-01 | End: 2025-02-20

## 2024-01-01 RX ORDER — ATORVASTATIN CALCIUM 40 MG/1
40 TABLET, FILM COATED ORAL DAILY
Qty: 90 TABLET | Refills: 3 | Status: SHIPPED | OUTPATIENT
Start: 2024-01-01 | End: 2024-03-09

## 2024-01-01 RX ORDER — BLOOD SUGAR DIAGNOSTIC
STRIP MISCELLANEOUS
Qty: 300 STRIP | Refills: 3 | Status: SHIPPED | OUTPATIENT
Start: 2024-01-01 | End: 2024-03-09

## 2024-01-01 RX ORDER — DEXAMETHASONE SODIUM PHOSPHATE 10 MG/ML
10 INJECTION INTRAMUSCULAR; INTRAVENOUS ONCE
Status: COMPLETED | OUTPATIENT
Start: 2024-01-01 | End: 2024-01-01

## 2024-01-01 RX ADMIN — DARBEPOETIN ALFA 60 MCG: 60 INJECTION, SOLUTION INTRAVENOUS; SUBCUTANEOUS at 08:04

## 2024-01-01 RX ADMIN — DARBEPOETIN ALFA 60 MCG: 60 INJECTION, SOLUTION INTRAVENOUS; SUBCUTANEOUS at 07:29

## 2024-01-01 RX ADMIN — MAGNESIUM SULFATE HEPTAHYDRATE 2 G: 40 INJECTION, SOLUTION INTRAVENOUS at 12:45

## 2024-01-01 RX ADMIN — DARBEPOETIN ALFA 60 MCG: 60 INJECTION, SOLUTION INTRAVENOUS; SUBCUTANEOUS at 08:09

## 2024-01-01 RX ADMIN — DEXAMETHASONE SODIUM PHOSPHATE 10 MG: 10 INJECTION INTRAMUSCULAR; INTRAVENOUS at 12:45

## 2024-01-01 RX ADMIN — DARBEPOETIN ALFA 80 MCG: 100 INJECTION, SOLUTION INTRAVENOUS; SUBCUTANEOUS at 07:55

## 2024-01-01 RX ADMIN — KIT FOR THE PREPARATION OF TECHNETIUM TC 99M ALBUMIN AGGREGATED 4.4 MILLICURIE: 2.5 INJECTION, POWDER, FOR SOLUTION INTRAVENOUS at 17:42

## 2024-01-01 ASSESSMENT — ENCOUNTER SYMPTOMS
HEMATOLOGIC/LYMPHATIC NEGATIVE: 1
DEPRESSION: 0
NEUROLOGICAL NEGATIVE: 1
LOSS OF SENSATION IN FEET: 0
MUSCULOSKELETAL NEGATIVE: 1
OCCASIONAL FEELINGS OF UNSTEADINESS: 0
PSYCHIATRIC NEGATIVE: 1
ALLERGIC/IMMUNOLOGIC NEGATIVE: 1
SHORTNESS OF BREATH: 1
GASTROINTESTINAL NEGATIVE: 1
CONSTITUTIONAL NEGATIVE: 1
ENDOCRINE NEGATIVE: 1
EYES NEGATIVE: 1

## 2024-01-01 ASSESSMENT — PAIN SCALES - GENERAL
PAINLEVEL_OUTOF10: 0-NO PAIN
PAINLEVEL_OUTOF10: 10 - WORST POSSIBLE PAIN
PAINLEVEL: 0-NO PAIN
PAINLEVEL: 0-NO PAIN

## 2024-01-01 ASSESSMENT — COLUMBIA-SUICIDE SEVERITY RATING SCALE - C-SSRS
2. HAVE YOU ACTUALLY HAD ANY THOUGHTS OF KILLING YOURSELF?: NO
6. HAVE YOU EVER DONE ANYTHING, STARTED TO DO ANYTHING, OR PREPARED TO DO ANYTHING TO END YOUR LIFE?: NO
1. IN THE PAST MONTH, HAVE YOU WISHED YOU WERE DEAD OR WISHED YOU COULD GO TO SLEEP AND NOT WAKE UP?: NO
1. IN THE PAST MONTH, HAVE YOU WISHED YOU WERE DEAD OR WISHED YOU COULD GO TO SLEEP AND NOT WAKE UP?: NO
6. HAVE YOU EVER DONE ANYTHING, STARTED TO DO ANYTHING, OR PREPARED TO DO ANYTHING TO END YOUR LIFE?: NO
2. HAVE YOU ACTUALLY HAD ANY THOUGHTS OF KILLING YOURSELF?: NO

## 2024-01-01 ASSESSMENT — PAIN DESCRIPTION - PAIN TYPE: TYPE: ACUTE PAIN

## 2024-01-01 ASSESSMENT — PAIN DESCRIPTION - LOCATION: LOCATION: THROAT

## 2024-01-01 ASSESSMENT — PAIN - FUNCTIONAL ASSESSMENT: PAIN_FUNCTIONAL_ASSESSMENT: 0-10

## 2024-01-01 ASSESSMENT — VISUAL ACUITY: OU: 1

## 2024-01-11 NOTE — PROGRESS NOTES
Call placed regarding one month post discharge follow up call.  At time of outreach call the patient feels as if their condition has remained the same since initial visit with PCP or specialist. Pt denies any needs from PCP at this time. Pt reports he has all of his medication.  Questions or concerns regarding recovery period addressed at this time.   Reviewed any PCP or specialists progress notes/labs/radiology reports if applicable and addressed any questions or concerns.  Verified next PCP appt 3/21/2024. Pt denies any questions, needs, or concerns. He is encouraged to call if questions or needs arise.

## 2024-01-23 NOTE — PROGRESS NOTES
FOLLOW-UP CARDIOLOGY ASSESSMENT    Patient underwent a diagnostic LHC which showed mild CAD in main arteries in a right dominant system, with severe proximal disease in a small (<2 mm) LCx-OM1 which is not suitable for PCI.     Cardiovascular Catheterization Report  Patient Name:     WILVER APLMER        Performing Physician:  [id number] Davison  Study Date:       11/16/2023          Verifying Physician:   [id number] Davison    Left Main Coronary Artery:  The left main coronary artery is a normal caliber vessel. The left main arises normally from the left coronary sinus of Valsalva and bifurcates into the LAD and circumflex coronary arteries. The left main coronary artery showed no significant disease or stenosis greater than 30%.     Left Anterior Descending Coronary Artery Distribution:  The left anterior descending coronary artery is a normal caliber vessel. The LAD arises normally from the left main coronary artery. The LAD demonstrated no significant disease or stenosis greater than 30%. The 1st diagonal branch showed no significant disease or stenosis greater than 30%. The 2nd diagonal branch demonstrated no significant disease or stenosis greater than 30%.     Circumflex Coronary Artery Distribution:  The circumflex coronary artery is a normal caliber vessel. The circumflex arises normally from the left main coronary artery and terminates in the AV groove. The circumflex revealed no significant disease or stenosis greater than 30%. The proximal 1st obtuse marginal branch showed 80-90%. The mid 2nd obtuse marginal branch demonstrated 30% stenosis.     Right Coronary Artery Distribution:     The right coronary artery is a normal caliber vessel. The RCA arises normally from the right sinus of Valsalva. The RCA showed no significant disease or stenosis greater than 30%. The acute marginal branch showed no significant disease or stenosis greater than 30%.  The right posterolateral branch showed no significant  disease or stenosis greater than 30%. The right posterior descending artery showed no significant disease or stenosis greater than 30%.     Coronary Lesion Summary:  Vessel   Stenosis   Vessel Segment  OM 1      80-90%       proximal  OM 2   30% stenosis      mid  ____________________________________________________________________________________  CONCLUSIONS:   1. Mild main vessel CAD and severe branch vessel [proximal OM1 with 80-90% stenosis] CAD in a right-dominant system.    Analysis/Plan:  In a patient with right dominant system and a small vessel, expected risk of significant devon-op cardiovascular complication (large MI, severe LV dysfunction) is low. In conclusion, no contraindication for kidney transplant from the cardiovascular standpoint.   Patient was recommended to increase his carvedilol dose and decrease amlodipine if needed if blood pressure drops. Continue statin dose unchanged to keep LDL <55.     Raymond Hardin MD

## 2024-01-25 NOTE — PROGRESS NOTES
Transplant Candidate Pharmacist Screening Note     Current Outpatient Medications on File Prior to Visit   Medication Sig Dispense Refill    albuterol 90 mcg/actuation inhaler Inhale 2 puffs every 6 hours if needed for wheezing or shortness of breath.      ALPRAZolam (Xanax) 0.5 mg tablet Take 1 tablet (0.5 mg) by mouth as needed at bedtime for sleep. 15 tablet 0    aspirin 81 mg chewable tablet Chew 1 tablet (81 mg) once daily.      atorvastatin (Lipitor) 40 mg tablet Take 1 tablet (40 mg) by mouth once daily at bedtime.      blood sugar diagnostic (Accu-Chek SmartView Test Strip) strip USE 3 TIMES DAILY 300 strip 2    Breztri Aerosphere 160-9-4.8 mcg/actuation HFA aerosol inhaler 2 puffs 2 times a day.      bumetanide (Bumex) 2 mg tablet Take 1 tablet (2 mg) by mouth every 12 hours. 60 tablet 0    calcium 500 mg calcium (1,250 mg) tablet Take 1 tablet (1,250 mg) by mouth once daily.      carvedilol (Coreg) 12.5 mg tablet TAKE 1 TABLET BY MOUTH TWICE  DAILY 180 tablet 3    cyanocobalamin (Vitamin B-12) 1,000 mcg tablet Take 1 tablet (1,000 mcg) by mouth once daily. as directed      Eliquis 2.5 mg tablet Take 1 tablet (2.5 mg) by mouth 2 times a day.      fish oil concentrate (Omega-3) 120-180 mg capsule Take 1 capsule (1 g) by mouth 2 times a day.      HumaLOG KwikPen Insulin 100 unit/mL injection INJECT SUBCUTANEOUSLY 20  UNITS BEFORE MEALS 60 mL 3    insulin degludec (Tresiba FlexTouch U-200) 200 unit/mL (3 mL) injection Inject 84 Units under the skin once daily at bedtime. 10 mL 12    levocetirizine (Xyzal) 5 mg tablet Take 1 tablet (5 mg) by mouth as needed at bedtime for allergies.      levothyroxine (Synthroid) 175 mcg tablet Take 1 tablet (175 mcg) by mouth once daily. 90 tablet 1    MAGNESIUM ORAL Take 500 mg by mouth 2 times a day.      magnesium oxide (Mag-Ox) 400 mg (241.3 mg magnesium) tablet Take 1 tablet (400 mg) by mouth 2 times a day.      multivitamin tablet Take 1 tablet by mouth once daily.       mycophenolate (Cellcept) 250 mg capsule Take 2 capsules (500 mg) by mouth 2 times a day. 120 capsule 11    NIFEdipine CC 30 mg 24 hr tablet Take 1 tablet (30 mg) by mouth once daily.      omeprazole (PriLOSEC) 20 mg DR capsule Take 1 capsule (20 mg) by mouth once daily.      predniSONE (Deltasone) 5 mg tablet Take 1 tablet (5 mg) by mouth once daily.      sodium bicarbonate 650 mg tablet Take 2 tablets (1,300 mg) by mouth 2 times a day.      tacrolimus (Prograf) 1 mg capsule Take 1 capsule (1 mg) by mouth every 12 hours.      tamsulosin (Flomax) 0.4 mg 24 hr capsule TAKE 1 CAPSULE BY MOUTH  DAILY 90 capsule 3     Current Facility-Administered Medications on File Prior to Visit   Medication Dose Route Frequency Provider Last Rate Last Admin    [COMPLETED] darbepoetin oneyda (Aranesp) injection 60 mcg  60 mcg subcutaneous Once Luis Daniel Selby, DO   60 mcg at 01/25/24 0809     The patient's reported medications have been reviewed. Based on the above medication list, there are no pharmacologic contraindications to kidney transplantation. Of note, the patient has had a prior kidney transplant. The patient also takes apixaban for a history of Factor V Leiden and has had a spontaneous DVT. It was discussed during selection committee that patient will initially be bridged post-transplant with a low dose heparin drip and will increase to a therapeutic rate once hemostasis is achieved.    Helen Bernstein, PharmD, BCTXP   Solid Organ Transplant Clinical Pharmacy Specialist

## 2024-01-31 NOTE — PROGRESS NOTES
Pt called in stating [facility] was [facility] and noticed he has upcoming appointments for Aranesp injections. He thought he was done with those? Should he be continuing to get them?

## 2024-02-09 NOTE — PROGRESS NOTES
Patient listed status 1 in [facility] and [facility].

## 2024-02-14 NOTE — PROGRESS NOTES
Subjective   He is feelign up and down  Looks pale  SOB comes and goes    Patient ID: Garcia Drummond is a 63 y.o. male who presents for Follow-up (Review labs 2/13).  HPI  I asked him to come in for an appointment due to abnormal lab results  He has a renal transplant and has had rejection  He has had worsening renal function and also been having shortness of breath and we have bumped up his diuretics to try to help with this  He appears to actually be doing better with how he feels.  However his renal function is looking much worse  His metabolic panel drawn yesterday shows a sodium of 148 potassium is 5 chloride 114 bicarb 21 BUN 70 with a creatinine of 4.47 with an estimated GFR of 14 albumin is 3.2 total protein is 5.3  His hemoglobin is 8.4 which is stable he is getting MONA therapy as an outpatient  His medications are reviewed currently on Xanax aspirin atorvastatin Bumex carvedilol Eliquis insulin magnesium nifedipine mycophenolate PPI prednisone sodium bicarb tacrolimus and Flomax  Review of Systems   Constitutional: Negative.    HENT: Negative.     Eyes: Negative.    Respiratory:  Positive for shortness of breath.    Cardiovascular:  Positive for leg swelling.   Gastrointestinal: Negative.    Endocrine: Negative.    Genitourinary: Negative.    Musculoskeletal: Negative.    Skin: Negative.    Allergic/Immunologic: Negative.    Neurological: Negative.    Hematological: Negative.    Psychiatric/Behavioral: Negative.         Objective   Physical Exam  Constitutional:       Appearance: Normal appearance.   HENT:      Head: Normocephalic and atraumatic.      Right Ear: External ear normal.      Left Ear: External ear normal.      Nose: Nose normal.      Mouth/Throat:      Mouth: Mucous membranes are moist.      Pharynx: Oropharynx is clear.   Eyes:      Extraocular Movements: Extraocular movements intact.      Conjunctiva/sclera: Conjunctivae normal.      Pupils: Pupils are equal, round, and reactive to light.    Cardiovascular:      Rate and Rhythm: Normal rate and regular rhythm.   Pulmonary:      Effort: Pulmonary effort is normal.      Breath sounds: Normal breath sounds.   Abdominal:      General: Abdomen is flat.      Palpations: Abdomen is soft.   Musculoskeletal:         General: Swelling present.      Right lower leg: Edema present.      Left lower leg: Edema present.   Skin:     General: Skin is warm and dry.   Neurological:      General: No focal deficit present.      Mental Status: He is alert and oriented to person, place, and time.   Psychiatric:         Mood and Affect: Mood normal.         Behavior: Behavior normal.         Assessment/Plan   Problem List Items Addressed This Visit             ICD-10-CM    History of kidney transplant - Primary Z94.0    Hypertension I10    AMPARO (obstructive sleep apnea) G47.33    Immunosuppression (CMS/[facility]) D84.9    Donor specific antibody (DSA) positive R76.8    Rejection of transplanted organ T86.91    Anemia in CKD (chronic kidney disease) N18.9, D63.1    CKD (chronic kidney disease) stage 4, GFR 15-29 ml/min (CMS/[facility]) N18.4    Relevant Orders    Referral to General Surgery    Basic metabolic panel    Follow Up In Nephrology   Renal function is currently at GFR of 14.  Right on the verge of CKD IV/V.  Wants to pursue PD  Will refer to surgery for Eval  On transplant list as of February 9, 2024 at .  Call with issues  Recheck labs in 1 month.  Keep MONA therapy    Shortness of breath: Improved  Bilateral pleural effusions  Renal Transplant July 25, 2018       Acute Rejection treated with IVIG and Steroids June 2021  Membranous Glomerulonephropathy  Immunocompromised State  HTN  DM II on Insulin  Hypothyroidism  Obesity  Nicotine use   CKD with Creat ranging 1.5-1.7 in past but now creatinine is elevated  Acute renal failure  HLD with Hypertriglyceridemia.   DVT and has now stopped his Eliquis  Proteinuria  Hyperuricemia       Luis Daniel Selby DO 02/14/24 11:26 AM

## 2024-02-21 NOTE — PROGRESS NOTES
Call placed regarding 90 day post discharge follow up call.  Pt reports that he is doing well.   Pt states he will need a refill on his synthroid- will notify PCP. Pt reports he has all of his other medication.  -Verified next PCP appt 3/21/2024 0820.  Pt denies any other questions, needs, or concerns. He is encouraged to call if questions or needs arise.

## 2024-03-01 NOTE — ED PROVIDER NOTES
Multiple complaints.  This 63-year-old white male with history of kidney failure presents to the ED with complaint of feeling lightheaded, nausea with vomiting and shortness of breath with sore throat symptoms.  He states that he started to have flulike symptoms on Monday and tested positive on Monday for COVID.  Patient states that his wife was just recently diagnosed with COVID and given to him.  States that he has had cough and shortness of breath in addition to her sore throat symptoms.  He states that he did have a spontaneous episode of vomiting though denies any abdominal pain currently.      History provided by:  Patient and spouse   used: No         Physical Exam  Vitals and nursing note reviewed.   Constitutional:       General: He is awake.      Appearance: Normal appearance. He is well-developed. He is morbidly obese.   HENT:      Head: Normocephalic and atraumatic.      Right Ear: Hearing and external ear normal.      Left Ear: Hearing and external ear normal.      Nose: Congestion and rhinorrhea present.      Mouth/Throat:      Lips: Pink.      Mouth: Mucous membranes are moist.      Pharynx: Oropharynx is clear. No oropharyngeal exudate or posterior oropharyngeal erythema.   Eyes:      General: Lids are normal. Vision grossly intact.         Right eye: No discharge.         Left eye: No discharge.      Extraocular Movements: Extraocular movements intact.      Conjunctiva/sclera: Conjunctivae normal.      Pupils: Pupils are equal, round, and reactive to light.   Cardiovascular:      Rate and Rhythm: Normal rate and regular rhythm.      Pulses: Normal pulses.      Heart sounds: Normal heart sounds. No murmur heard.     No friction rub. No gallop.   Pulmonary:      Effort: Pulmonary effort is normal. No respiratory distress.      Breath sounds: Normal breath sounds. No stridor. No wheezing, rhonchi or rales.      Comments: Course bibasilar breath sounds with frequent cough.  Chest:       Chest wall: No tenderness.   Abdominal:      General: Abdomen is protuberant. Bowel sounds are normal. There is no distension.      Palpations: Abdomen is soft. There is no mass.      Tenderness: There is no abdominal tenderness. There is no guarding or rebound.      Hernia: No hernia is present.      Comments: 9 abdominal exam.   Musculoskeletal:         General: No swelling, tenderness, deformity or signs of injury. Normal range of motion.      Cervical back: Full passive range of motion without pain, normal range of motion and neck supple.      Right lower le+ Edema present.      Left lower le+ Edema present.   Skin:     General: Skin is warm and dry.      Capillary Refill: Capillary refill takes less than 2 seconds.      Coloration: Skin is not jaundiced or pale.      Findings: No bruising, erythema, lesion or rash.   Neurological:      General: No focal deficit present.      Mental Status: He is alert and oriented to person, place, and time.      GCS: GCS eye subscore is 4. GCS verbal subscore is 5. GCS motor subscore is 6.      Cranial Nerves: Cranial nerves 2-12 are intact. No cranial nerve deficit.      Sensory: Sensation is intact. No sensory deficit.      Motor: Motor function is intact. No weakness.      Coordination: Coordination normal.      Deep Tendon Reflexes: Reflexes normal.   Psychiatric:         Mood and Affect: Mood normal.         Behavior: Behavior normal. Behavior is cooperative.         Thought Content: Thought content normal.         Judgment: Judgment normal.          Labs Reviewed   CBC WITH AUTO DIFFERENTIAL - Abnormal       Result Value    WBC 8.2      nRBC 0.0      RBC 2.96 (*)     Hemoglobin 8.2 (*)     Hematocrit 27.4 (*)     MCV 93      MCH 27.7      MCHC 29.9 (*)     RDW 13.5      Platelets 166      Neutrophils % 87.3      Immature Granulocytes %, Automated 0.4      Lymphocytes % 3.8      Monocytes % 7.2      Eosinophils % 1.1      Basophils % 0.2      Neutrophils  Absolute 7.15      Immature Granulocytes Absolute, Automated 0.03      Lymphocytes Absolute 0.31 (*)     Monocytes Absolute 0.59      Eosinophils Absolute 0.09      Basophils Absolute 0.02     COMPREHENSIVE METABOLIC PANEL - Abnormal    Glucose 175 (*)     Sodium 144      Potassium 4.9      Chloride 114 (*)     Bicarbonate 20 (*)     Anion Gap 15      Urea Nitrogen 66 (*)     Creatinine 5.32 (*)     eGFR 11 (*)     Calcium 7.9 (*)     Albumin 3.1 (*)     Alkaline Phosphatase 62      Total Protein 5.7 (*)     AST 11      Bilirubin, Total 0.2      ALT 9 (*)    MAGNESIUM - Abnormal    Magnesium 1.06 (*)    B-TYPE NATRIURETIC PEPTIDE - Abnormal     (*)     Narrative:        <100 pg/mL - Heart failure unlikely  100-299 pg/mL - Intermediate probability of acute heart                  failure exacerbation. Correlate with clinical                  context and patient history.    >=300 pg/mL - Heart Failure likely. Correlate with clinical                  context and patient history.    BNP testing is performed using different testing methodology at [facility] than at other [facility]. Direct result comparisons should only be made within the same method.      D-DIMER, VTE EXCLUSION - Abnormal    D-Dimer, Quantitative VTE Exclusion 841 (*)     Narrative:     The VTE Exclusion D-Dimer assay is reported in ng/mL Fibrinogen Equivalent Units (FEU).    Per 's instructions for use, a value of less than 500 ng/mL (FEU) may help to exclude DVT or PE in outpatients when the assay is used with a clinical pretest probability assessment.([facility] must utilize and document eCalc 'Wells Score Deep Vein Thrombosis Risk' for DVT exclusion only. Emergency Department should utilize  Guidelines for Emergency Department Use of the VTE Exclusion D-Dimer and Clinical Pretest probability assessment model for DVT or PE exclusion.)   SERIAL TROPONIN-INITIAL - Abnormal    Troponin I, High Sensitivity 37 (*)      Narrative:     Less than 99th percentile of normal range cutoff-  Female and children under 18 years old <14 ng/L; Male <21 ng/L: Negative  Repeat testing should be performed if clinically indicated.     Female and children under 18 years old 14-50 ng/L; Male 21-50 ng/L:  Consistent with possible cardiac damage and possible increased clinical   risk. Serial measurements may help to assess extent of myocardial damage.     >50 ng/L: Consistent with cardiac damage, increased clinical risk and  myocardial infarction. Serial measurements may help assess extent of   myocardial damage.      NOTE: Children less than 1 year old may have higher baseline troponin   levels and results should be interpreted in conjunction with the overall   clinical context.     NOTE: Troponin I testing is performed using a different   testing methodology at [facility] than at other   [facility]. Direct result comparisons should only   be made within the same method.   SERIAL TROPONIN, 1 HOUR - Abnormal    Troponin I, High Sensitivity 36 (*)     Narrative:     Less than 99th percentile of normal range cutoff-  Female and children under 18 years old <14 ng/L; Male <21 ng/L: Negative  Repeat testing should be performed if clinically indicated.     Female and children under 18 years old 14-50 ng/L; Male 21-50 ng/L:  Consistent with possible cardiac damage and possible increased clinical   risk. Serial measurements may help to assess extent of myocardial damage.     >50 ng/L: Consistent with cardiac damage, increased clinical risk and  myocardial infarction. Serial measurements may help assess extent of   myocardial damage.      NOTE: Children less than 1 year old may have higher baseline troponin   levels and results should be interpreted in conjunction with the overall   clinical context.     NOTE: Troponin I testing is performed using a different   testing methodology at [facility] than at other   [facility]  [facility]. Direct result comparisons should only   be made within the same method.   GROUP A STREPTOCOCCUS, PCR - Normal    Group A Strep PCR Not Detected     TROPONIN SERIES- (INITIAL, 1 HR)    Narrative:     The following orders were created for panel order Troponin I Series, High Sensitivity (0, 1 HR).  Procedure                               Abnormality         Status                     ---------                               -----------         ------                     Troponin I, High Sensiti...[[id number]]  Abnormal            Final result               Troponin, High Sensitivi...[[id number]]  Abnormal            Final result                 Please view results for these tests on the individual orders.        NM Lung perfusion with spect/ct   Final Result   Low probability of acute pulmonary embolism.        Images were interpreted at [facility].        Signed by: Aleksander Montaño 3/1/2024 6:51 PM   Dictation workstation:   [id number]      XR chest 1 view   Final Result   1.  Limited assessment of the bases related to patient position with   increased haziness likely at least in part related to overlapping   body wall. Attention recommended on clinical assessment.   2. Persistent curvilinear scar or atelectasis.                  MACRO:   None        Signed by: Chalino So 3/1/2024 11:25 AM   Dictation workstation:   [id number]           Procedures     Medical Decision Making  Patient was seen and evaluated emerged department due to complaints of shortness of breath with recent diagnosis of COVID-19.  Patient also complains of feeling lightheaded with some nausea vomiting and sore throat symptoms.  Patient had extensive workup performed with a normal white cell count 8.2.  Hemoglobin is low at 8.2.  Rapid strep was negative.  Blood sugar was 175 chloride 114 bicarb 20 BUN 66 creatinine 5.32 GFR of 11.  Magnesium was low at 1.06.  BNP was elevated at 513.  D-dimer was  high at 841.  Troponin initially was 30 7 repeat was 36.  Chest x-ray revealed limited assessment the bases related to patient positioning with increased haziness likely at least in part related to overlapping body wall tension recommending clinical assessment persistent curvilinear scar or atelectasis.  Due to the patient's elevated D-dimer a VQ scan was ordered and this revealed low probability for pulmonary embolism subsequently patient was discharged home I did have a discussion with the patient concerning his lab results and V/Q results.    Amount and/or Complexity of Data Reviewed  ECG/medicine tests: independent interpretation performed.     Details: EKG is interpreted by myself at 10:33 AM reveals normal sinus rhythm with nonspecific ST-T wave changes.  The heart rate 79 bpm.  The SC interval is 174 ms.  The QRS duration 64 ms.  The QTc is 405 ms.  Axis is 33 degrees.  EKG was interpreted by myself at 11:37 AM reveals normal sinus rhythm with nonspecific ST-T wave changes.  Heart rate is 76 bpm.  The SC interval is 168 ms.  QRS duration 78 ms.  The QTc is 436 ms Axis is 45 degrees.         Diagnoses as of 03/01/24 1905   Acute congestive heart failure, unspecified heart failure type (CMS/HCC)   COVID-19   Stage 4 chronic kidney disease (CMS/HCC)                    Chris Malone,   03/01/24 1905

## 2024-03-11 ENCOUNTER — APPOINTMENT (OUTPATIENT)
Dept: NEPHROLOGY | Facility: CLINIC | Age: 64
End: 2024-03-11
Payer: MEDICARE

## 2024-03-12 LAB
ATRIAL RATE: 76 BPM
ATRIAL RATE: 79 BPM
P AXIS: 43 DEGREES
P AXIS: 53 DEGREES
P OFFSET: 187 MS
P OFFSET: 190 MS
P ONSET: 140 MS
P ONSET: 143 MS
PR INTERVAL: 168 MS
PR INTERVAL: 174 MS
Q ONSET: 224 MS
Q ONSET: 230 MS
QRS COUNT: 13 BEATS
QRS COUNT: 13 BEATS
QRS DURATION: 64 MS
QRS DURATION: 78 MS
QT INTERVAL: 354 MS
QT INTERVAL: 388 MS
QTC CALCULATION(BAZETT): 405 MS
QTC CALCULATION(BAZETT): 436 MS
QTC FREDERICIA: 388 MS
QTC FREDERICIA: 419 MS
R AXIS: 33 DEGREES
R AXIS: 45 DEGREES
T AXIS: 81 DEGREES
T AXIS: 97 DEGREES
T OFFSET: 407 MS
T OFFSET: 418 MS
VENTRICULAR RATE: 76 BPM
VENTRICULAR RATE: 79 BPM

## 2024-03-14 ENCOUNTER — APPOINTMENT (OUTPATIENT)
Dept: NEPHROLOGY | Facility: CLINIC | Age: 64
End: 2024-03-14
Payer: MEDICARE

## 2024-03-21 ENCOUNTER — APPOINTMENT (OUTPATIENT)
Dept: HEMATOLOGY/ONCOLOGY | Facility: CLINIC | Age: 64
End: 2024-03-21
Payer: MEDICARE

## 2024-03-21 ENCOUNTER — APPOINTMENT (OUTPATIENT)
Dept: PRIMARY CARE | Facility: CLINIC | Age: 64
End: 2024-03-21
Payer: MEDICARE

## 2024-04-04 ENCOUNTER — APPOINTMENT (OUTPATIENT)
Dept: HEMATOLOGY/ONCOLOGY | Facility: CLINIC | Age: 64
End: 2024-04-04
Payer: MEDICARE

## 2024-04-18 ENCOUNTER — APPOINTMENT (OUTPATIENT)
Dept: HEMATOLOGY/ONCOLOGY | Facility: CLINIC | Age: 64
End: 2024-04-18
Payer: MEDICARE

## 2024-06-03 ENCOUNTER — APPOINTMENT (OUTPATIENT)
Dept: HEMATOLOGY/ONCOLOGY | Facility: CLINIC | Age: 64
End: 2024-06-03
Payer: MEDICARE

## (undated) DEVICE — CATHETER, ANGIO, IMPULSE, FR4, 5 FR X 100 CM

## (undated) DEVICE — SHEATH, GLIDESHEATH, SLENDER, 6FR 10CM

## (undated) DEVICE — CATHETER, ANGIO, IMPULSE, FL3.5, 5 FR X 100 CM

## (undated) DEVICE — BAND, VASCULAR, RADIAL HEMOSTAT, REGULAR 24CM

## (undated) DEVICE — GUIDEWIRE, HI-TORQUE, VERSACORE, 260CM, FLOPPY